# Patient Record
Sex: FEMALE | Race: OTHER | NOT HISPANIC OR LATINO | URBAN - METROPOLITAN AREA
[De-identification: names, ages, dates, MRNs, and addresses within clinical notes are randomized per-mention and may not be internally consistent; named-entity substitution may affect disease eponyms.]

---

## 2017-01-17 ENCOUNTER — EMERGENCY (EMERGENCY)
Facility: HOSPITAL | Age: 62
LOS: 0 days | Discharge: HOME | End: 2017-01-17
Admitting: EMERGENCY MEDICINE
Payer: MEDICAID

## 2017-01-17 DIAGNOSIS — S09.90XA UNSPECIFIED INJURY OF HEAD, INITIAL ENCOUNTER: ICD-10-CM

## 2017-01-17 DIAGNOSIS — R52 PAIN, UNSPECIFIED: ICD-10-CM

## 2017-01-17 DIAGNOSIS — I10 ESSENTIAL (PRIMARY) HYPERTENSION: ICD-10-CM

## 2017-01-17 DIAGNOSIS — E78.5 HYPERLIPIDEMIA, UNSPECIFIED: ICD-10-CM

## 2017-01-17 DIAGNOSIS — M25.561 PAIN IN RIGHT KNEE: ICD-10-CM
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
## 2017-01-17 DIAGNOSIS — M25.551 PAIN IN RIGHT HIP: ICD-10-CM

## 2017-01-17 PROCEDURE — 71010: CPT | Mod: 26

## 2017-01-17 PROCEDURE — 99284 EMERGENCY DEPT VISIT MOD MDM: CPT

## 2017-01-17 PROCEDURE — 73552 X-RAY EXAM OF FEMUR 2/>: CPT | Mod: 26

## 2017-01-17 PROCEDURE — 73502 X-RAY EXAM HIP UNI 2-3 VIEWS: CPT | Mod: 26

## 2017-01-17 PROCEDURE — 93010 ELECTROCARDIOGRAM REPORT: CPT

## 2017-01-17 PROCEDURE — 70450 CT HEAD/BRAIN W/O DYE: CPT | Mod: 26

## 2017-01-17 PROCEDURE — 73030 X-RAY EXAM OF SHOULDER: CPT | Mod: 26,RT

## 2019-03-25 ENCOUNTER — EMERGENCY (EMERGENCY)
Facility: HOSPITAL | Age: 64
LOS: 0 days | Discharge: HOME | End: 2019-03-25
Attending: EMERGENCY MEDICINE | Admitting: EMERGENCY MEDICINE
Payer: COMMERCIAL

## 2019-03-25 VITALS
TEMPERATURE: 98 F | WEIGHT: 104.06 LBS | OXYGEN SATURATION: 100 % | SYSTOLIC BLOOD PRESSURE: 153 MMHG | DIASTOLIC BLOOD PRESSURE: 81 MMHG | RESPIRATION RATE: 18 BRPM | HEART RATE: 81 BPM

## 2019-03-25 VITALS
DIASTOLIC BLOOD PRESSURE: 66 MMHG | HEART RATE: 66 BPM | TEMPERATURE: 98 F | RESPIRATION RATE: 15 BRPM | OXYGEN SATURATION: 100 % | SYSTOLIC BLOOD PRESSURE: 124 MMHG

## 2019-03-25 DIAGNOSIS — E78.5 HYPERLIPIDEMIA, UNSPECIFIED: ICD-10-CM

## 2019-03-25 DIAGNOSIS — R42 DIZZINESS AND GIDDINESS: ICD-10-CM

## 2019-03-25 DIAGNOSIS — I10 ESSENTIAL (PRIMARY) HYPERTENSION: ICD-10-CM

## 2019-03-25 LAB
ALBUMIN SERPL ELPH-MCNC: 4.2 G/DL — SIGNIFICANT CHANGE UP (ref 3.3–5)
ALP SERPL-CCNC: 88 U/L — SIGNIFICANT CHANGE UP (ref 40–120)
ALT FLD-CCNC: 25 U/L — SIGNIFICANT CHANGE UP (ref 12–78)
ANION GAP SERPL CALC-SCNC: 5 MMOL/L — SIGNIFICANT CHANGE UP (ref 5–17)
APPEARANCE UR: CLEAR — SIGNIFICANT CHANGE UP
APTT BLD: 31.8 SEC — SIGNIFICANT CHANGE UP (ref 27.5–36.3)
AST SERPL-CCNC: 25 U/L — SIGNIFICANT CHANGE UP (ref 15–37)
BASOPHILS # BLD AUTO: 0.07 K/UL — SIGNIFICANT CHANGE UP (ref 0–0.2)
BASOPHILS NFR BLD AUTO: 1 % — SIGNIFICANT CHANGE UP (ref 0–2)
BILIRUB SERPL-MCNC: 0.6 MG/DL — SIGNIFICANT CHANGE UP (ref 0.2–1.2)
BILIRUB UR-MCNC: NEGATIVE — SIGNIFICANT CHANGE UP
BUN SERPL-MCNC: 13 MG/DL — SIGNIFICANT CHANGE UP (ref 7–23)
CALCIUM SERPL-MCNC: 8.3 MG/DL — LOW (ref 8.5–10.1)
CHLORIDE SERPL-SCNC: 100 MMOL/L — SIGNIFICANT CHANGE UP (ref 96–108)
CO2 SERPL-SCNC: 28 MMOL/L — SIGNIFICANT CHANGE UP (ref 22–31)
COLOR SPEC: YELLOW — SIGNIFICANT CHANGE UP
CREAT SERPL-MCNC: 0.77 MG/DL — SIGNIFICANT CHANGE UP (ref 0.5–1.3)
DIFF PNL FLD: NEGATIVE — SIGNIFICANT CHANGE UP
EOSINOPHIL # BLD AUTO: 0.15 K/UL — SIGNIFICANT CHANGE UP (ref 0–0.5)
EOSINOPHIL NFR BLD AUTO: 2.1 % — SIGNIFICANT CHANGE UP (ref 0–6)
GLUCOSE SERPL-MCNC: 123 MG/DL — HIGH (ref 70–99)
GLUCOSE UR QL: NEGATIVE MG/DL — SIGNIFICANT CHANGE UP
HCT VFR BLD CALC: 40.4 % — SIGNIFICANT CHANGE UP (ref 34.5–45)
HGB BLD-MCNC: 13.8 G/DL — SIGNIFICANT CHANGE UP (ref 11.5–15.5)
IMM GRANULOCYTES NFR BLD AUTO: 0.1 % — SIGNIFICANT CHANGE UP (ref 0–1.5)
INR BLD: 0.96 RATIO — SIGNIFICANT CHANGE UP (ref 0.88–1.16)
KETONES UR-MCNC: NEGATIVE — SIGNIFICANT CHANGE UP
LEUKOCYTE ESTERASE UR-ACNC: NEGATIVE — SIGNIFICANT CHANGE UP
LYMPHOCYTES # BLD AUTO: 2.79 K/UL — SIGNIFICANT CHANGE UP (ref 1–3.3)
LYMPHOCYTES # BLD AUTO: 39.7 % — SIGNIFICANT CHANGE UP (ref 13–44)
MAGNESIUM SERPL-MCNC: 2.1 MG/DL — SIGNIFICANT CHANGE UP (ref 1.6–2.6)
MCHC RBC-ENTMCNC: 30 PG — SIGNIFICANT CHANGE UP (ref 27–34)
MCHC RBC-ENTMCNC: 34.2 GM/DL — SIGNIFICANT CHANGE UP (ref 32–36)
MCV RBC AUTO: 87.8 FL — SIGNIFICANT CHANGE UP (ref 80–100)
MONOCYTES # BLD AUTO: 0.5 K/UL — SIGNIFICANT CHANGE UP (ref 0–0.9)
MONOCYTES NFR BLD AUTO: 7.1 % — SIGNIFICANT CHANGE UP (ref 2–14)
NEUTROPHILS # BLD AUTO: 3.51 K/UL — SIGNIFICANT CHANGE UP (ref 1.8–7.4)
NEUTROPHILS NFR BLD AUTO: 50 % — SIGNIFICANT CHANGE UP (ref 43–77)
NITRITE UR-MCNC: NEGATIVE — SIGNIFICANT CHANGE UP
NRBC # BLD: 0 /100 WBCS — SIGNIFICANT CHANGE UP (ref 0–0)
NT-PROBNP SERPL-SCNC: 19 PG/ML — SIGNIFICANT CHANGE UP (ref 0–125)
PH UR: 8 — SIGNIFICANT CHANGE UP (ref 5–8)
PLATELET # BLD AUTO: 274 K/UL — SIGNIFICANT CHANGE UP (ref 150–400)
POTASSIUM SERPL-MCNC: 3.4 MMOL/L — LOW (ref 3.5–5.3)
POTASSIUM SERPL-SCNC: 3.4 MMOL/L — LOW (ref 3.5–5.3)
PROT SERPL-MCNC: 8.3 GM/DL — SIGNIFICANT CHANGE UP (ref 6–8.3)
PROT UR-MCNC: NEGATIVE MG/DL — SIGNIFICANT CHANGE UP
PROTHROM AB SERPL-ACNC: 10.6 SEC — SIGNIFICANT CHANGE UP (ref 10–12.9)
RBC # BLD: 4.6 M/UL — SIGNIFICANT CHANGE UP (ref 3.8–5.2)
RBC # FLD: 12.4 % — SIGNIFICANT CHANGE UP (ref 10.3–14.5)
SODIUM SERPL-SCNC: 133 MMOL/L — LOW (ref 135–145)
SP GR SPEC: 1.01 — SIGNIFICANT CHANGE UP (ref 1.01–1.02)
TROPONIN I SERPL-MCNC: <0.015 NG/ML — SIGNIFICANT CHANGE UP (ref 0.01–0.04)
UROBILINOGEN FLD QL: NEGATIVE MG/DL — SIGNIFICANT CHANGE UP
WBC # BLD: 7.03 K/UL — SIGNIFICANT CHANGE UP (ref 3.8–10.5)
WBC # FLD AUTO: 7.03 K/UL — SIGNIFICANT CHANGE UP (ref 3.8–10.5)

## 2019-03-25 PROCEDURE — 99284 EMERGENCY DEPT VISIT MOD MDM: CPT

## 2019-03-25 PROCEDURE — 71046 X-RAY EXAM CHEST 2 VIEWS: CPT | Mod: 26

## 2019-03-25 PROCEDURE — 93010 ELECTROCARDIOGRAM REPORT: CPT

## 2019-03-25 RX ORDER — MECLIZINE HCL 12.5 MG
25 TABLET ORAL ONCE
Qty: 0 | Refills: 0 | Status: COMPLETED | OUTPATIENT
Start: 2019-03-25 | End: 2019-03-25

## 2019-03-25 RX ORDER — MECLIZINE HCL 12.5 MG
1 TABLET ORAL
Qty: 15 | Refills: 0
Start: 2019-03-25 | End: 2019-03-29

## 2019-03-25 RX ADMIN — Medication 25 MILLIGRAM(S): at 11:15

## 2019-03-25 NOTE — ED ADULT NURSE NOTE - OBJECTIVE STATEMENT
Patient presents to ED c/o chest pain associated with dizziness, nausea and weakness. Pt states symptoms started about 8am this morning. Denies vomiting, fevers, chills, abd pain, SOB.

## 2019-03-25 NOTE — ED PROVIDER NOTE - CARE PLAN
Principal Discharge DX:	Chest pressure Principal Discharge DX:	Lightheadedness Principal Discharge DX:	Lightheadedness  Secondary Diagnosis:	Dizziness

## 2019-03-25 NOTE — ED ADULT NURSE NOTE - NSIMPLEMENTINTERV_GEN_ALL_ED
Implemented All Fall Risk Interventions:  [place] to call system. Call bell, personal items and telephone within reach. Instruct patient to call for assistance. Room bathroom lighting operational. Non-slip footwear when patient is off stretcher. Physically safe environment: no spills, clutter or unnecessary equipment. Stretcher in lowest position, wheels locked, appropriate side rails in place. Provide visual cue, wrist band, yellow gown, etc. Monitor gait and stability. Monitor for mental status changes and reorient to person, place, and time. Review medications for side effects contributing to fall risk. Reinforce activity limits and safety measures with patient and family.

## 2019-03-25 NOTE — ED PROVIDER NOTE - OBJECTIVE STATEMENT
63yoF hx of htn, hld pw chest pressure, starting about 2 hours prior to arrival, radiating to right shoulder, arm and neck. Pain started slowly, comes in waves, and is associated with general malaise. Patient never had this before. associated nausea and generalized weakness. Denies fevers, chills, vomiting, diarrhea, abd pain, numbness, tingling, cough, cold, recent travel or surgery, or other issues. 63yoF hx of htn, hld pw chest pressure, starting about 2 hours prior to arrival, radiating to right shoulder, arm and neck. Pain started slowly, comes in waves, and is associated with general malaise. Patient never had this before. associated nausea and generalized weakness. Denies fevers, chills, vomiting, diarrhea, abd pain, numbness, tingling, cough, cold, recent travel or surgery, or other issues.  EMS gave 4 Aspirin 81mg in route

## 2019-03-25 NOTE — ED PROVIDER NOTE - ATTENDING CONTRIBUTION TO CARE
I, Georges Alvarez MD, personally saw the patient with the resident, and completed the key components of the history and physical exam. I then discussed the management plan with the resident.

## 2019-03-25 NOTE — ED PROVIDER NOTE - NSFOLLOWUPINSTRUCTIONS_ED_ALL_ED_FT
1) Follow up with your doctor in 1 week. Call today for an appointment.   2) Return to the ER immediately for new or worsening symptoms including severe chest pain, vomiting or other issues.   3) Take Meclizine 25mg three times a day as needed for lightheadedness, pressure or dizziness.

## 2019-03-25 NOTE — ED PROVIDER NOTE - CLINICAL SUMMARY MEDICAL DECISION MAKING FREE TEXT BOX
female with chest pressure, heart score 2, will send labs, trop x2, ekg, cxr and reass. story inconsistent with PE, dissection or other vascular catastrophe. patient appears well with normal vitals.

## 2019-03-25 NOTE — ED PROVIDER NOTE - PROGRESS NOTE DETAILS
Scribe AD for attending Dr. Alvarez: 63 presents with body pains, aleena douglas pipe positive for dizziness. Will check EKG and give meclizine. Pt with h/o dizziness with MRI done 2 months ago with no abnormal findings. Santiago GARCES - patient seen and reassessed. feels improved. family bedside. will dc home with close follow up. Patient aware and agrees with plan.

## 2019-03-25 NOTE — ED ADULT NURSE REASSESSMENT NOTE - NS ED NURSE REASSESS COMMENT FT1
pt being assisted by YOUSUF Easton to bathroom
Antivert administered, pt reports she feels "better". Pt ambulated to bathroom, denies dizziness.

## 2019-03-25 NOTE — ED ADULT NURSE NOTE - CHPI ED NUR SYMPTOMS NEG
no fever/no nausea/no syncope/no chills/no back pain/no diaphoresis/no congestion/no shortness of breath/no vomiting

## 2019-03-28 ENCOUNTER — EMERGENCY (EMERGENCY)
Facility: HOSPITAL | Age: 64
LOS: 1 days | Discharge: HOME | End: 2019-03-28
Attending: EMERGENCY MEDICINE | Admitting: STUDENT IN AN ORGANIZED HEALTH CARE EDUCATION/TRAINING PROGRAM
Payer: COMMERCIAL

## 2019-03-28 VITALS
TEMPERATURE: 98 F | SYSTOLIC BLOOD PRESSURE: 107 MMHG | OXYGEN SATURATION: 97 % | RESPIRATION RATE: 16 BRPM | DIASTOLIC BLOOD PRESSURE: 53 MMHG | HEART RATE: 71 BPM

## 2019-03-28 VITALS — WEIGHT: 104.06 LBS

## 2019-03-28 DIAGNOSIS — R42 DIZZINESS AND GIDDINESS: ICD-10-CM

## 2019-03-28 DIAGNOSIS — M54.2 CERVICALGIA: ICD-10-CM

## 2019-03-28 DIAGNOSIS — R25.1 TREMOR, UNSPECIFIED: ICD-10-CM

## 2019-03-28 DIAGNOSIS — Z79.52 LONG TERM (CURRENT) USE OF SYSTEMIC STEROIDS: ICD-10-CM

## 2019-03-28 DIAGNOSIS — E78.5 HYPERLIPIDEMIA, UNSPECIFIED: ICD-10-CM

## 2019-03-28 DIAGNOSIS — I10 ESSENTIAL (PRIMARY) HYPERTENSION: ICD-10-CM

## 2019-03-28 LAB
ALBUMIN SERPL ELPH-MCNC: 4 G/DL — SIGNIFICANT CHANGE UP (ref 3.3–5)
ALP SERPL-CCNC: 76 U/L — SIGNIFICANT CHANGE UP (ref 40–120)
ALT FLD-CCNC: 23 U/L — SIGNIFICANT CHANGE UP (ref 12–78)
ANION GAP SERPL CALC-SCNC: 7 MMOL/L — SIGNIFICANT CHANGE UP (ref 5–17)
APPEARANCE UR: CLEAR — SIGNIFICANT CHANGE UP
AST SERPL-CCNC: 19 U/L — SIGNIFICANT CHANGE UP (ref 15–37)
BACTERIA # UR AUTO: ABNORMAL
BASOPHILS # BLD AUTO: 0.04 K/UL — SIGNIFICANT CHANGE UP (ref 0–0.2)
BASOPHILS NFR BLD AUTO: 0.2 % — SIGNIFICANT CHANGE UP (ref 0–2)
BILIRUB SERPL-MCNC: 0.3 MG/DL — SIGNIFICANT CHANGE UP (ref 0.2–1.2)
BILIRUB UR-MCNC: NEGATIVE — SIGNIFICANT CHANGE UP
BUN SERPL-MCNC: 11 MG/DL — SIGNIFICANT CHANGE UP (ref 7–23)
CALCIUM SERPL-MCNC: 8.6 MG/DL — SIGNIFICANT CHANGE UP (ref 8.5–10.1)
CHLORIDE SERPL-SCNC: 107 MMOL/L — SIGNIFICANT CHANGE UP (ref 96–108)
CO2 SERPL-SCNC: 27 MMOL/L — SIGNIFICANT CHANGE UP (ref 22–31)
COLOR SPEC: YELLOW — SIGNIFICANT CHANGE UP
CREAT SERPL-MCNC: 0.8 MG/DL — SIGNIFICANT CHANGE UP (ref 0.5–1.3)
DIFF PNL FLD: NEGATIVE — SIGNIFICANT CHANGE UP
EOSINOPHIL # BLD AUTO: 0 K/UL — SIGNIFICANT CHANGE UP (ref 0–0.5)
EOSINOPHIL NFR BLD AUTO: 0 % — SIGNIFICANT CHANGE UP (ref 0–6)
EPI CELLS # UR: SIGNIFICANT CHANGE UP
GLUCOSE SERPL-MCNC: 121 MG/DL — HIGH (ref 70–99)
GLUCOSE UR QL: NEGATIVE MG/DL — SIGNIFICANT CHANGE UP
HCT VFR BLD CALC: 39.4 % — SIGNIFICANT CHANGE UP (ref 34.5–45)
HGB BLD-MCNC: 13.1 G/DL — SIGNIFICANT CHANGE UP (ref 11.5–15.5)
IMM GRANULOCYTES NFR BLD AUTO: 0.5 % — SIGNIFICANT CHANGE UP (ref 0–1.5)
KETONES UR-MCNC: NEGATIVE — SIGNIFICANT CHANGE UP
LEUKOCYTE ESTERASE UR-ACNC: ABNORMAL
LYMPHOCYTES # BLD AUTO: 1.9 K/UL — SIGNIFICANT CHANGE UP (ref 1–3.3)
LYMPHOCYTES # BLD AUTO: 10.9 % — LOW (ref 13–44)
MCHC RBC-ENTMCNC: 29.2 PG — SIGNIFICANT CHANGE UP (ref 27–34)
MCHC RBC-ENTMCNC: 33.2 GM/DL — SIGNIFICANT CHANGE UP (ref 32–36)
MCV RBC AUTO: 87.9 FL — SIGNIFICANT CHANGE UP (ref 80–100)
MONOCYTES # BLD AUTO: 0.64 K/UL — SIGNIFICANT CHANGE UP (ref 0–0.9)
MONOCYTES NFR BLD AUTO: 3.7 % — SIGNIFICANT CHANGE UP (ref 2–14)
NEUTROPHILS # BLD AUTO: 14.76 K/UL — HIGH (ref 1.8–7.4)
NEUTROPHILS NFR BLD AUTO: 84.7 % — HIGH (ref 43–77)
NITRITE UR-MCNC: NEGATIVE — SIGNIFICANT CHANGE UP
NRBC # BLD: 0 /100 WBCS — SIGNIFICANT CHANGE UP (ref 0–0)
PH UR: 7 — SIGNIFICANT CHANGE UP (ref 5–8)
PLATELET # BLD AUTO: 288 K/UL — SIGNIFICANT CHANGE UP (ref 150–400)
POTASSIUM SERPL-MCNC: 3.8 MMOL/L — SIGNIFICANT CHANGE UP (ref 3.5–5.3)
POTASSIUM SERPL-SCNC: 3.8 MMOL/L — SIGNIFICANT CHANGE UP (ref 3.5–5.3)
PROT SERPL-MCNC: 8.2 GM/DL — SIGNIFICANT CHANGE UP (ref 6–8.3)
PROT UR-MCNC: NEGATIVE MG/DL — SIGNIFICANT CHANGE UP
RBC # BLD: 4.48 M/UL — SIGNIFICANT CHANGE UP (ref 3.8–5.2)
RBC # FLD: 12.2 % — SIGNIFICANT CHANGE UP (ref 10.3–14.5)
RBC CASTS # UR COMP ASSIST: SIGNIFICANT CHANGE UP /HPF (ref 0–4)
SODIUM SERPL-SCNC: 141 MMOL/L — SIGNIFICANT CHANGE UP (ref 135–145)
SP GR SPEC: 1 — LOW (ref 1.01–1.02)
UROBILINOGEN FLD QL: NEGATIVE MG/DL — SIGNIFICANT CHANGE UP
WBC # BLD: 17.43 K/UL — HIGH (ref 3.8–10.5)
WBC # FLD AUTO: 17.43 K/UL — HIGH (ref 3.8–10.5)
WBC UR QL: SIGNIFICANT CHANGE UP

## 2019-03-28 PROCEDURE — 99285 EMERGENCY DEPT VISIT HI MDM: CPT

## 2019-03-28 PROCEDURE — 71046 X-RAY EXAM CHEST 2 VIEWS: CPT | Mod: 26

## 2019-03-28 RX ORDER — SODIUM CHLORIDE 9 MG/ML
1000 INJECTION INTRAMUSCULAR; INTRAVENOUS; SUBCUTANEOUS ONCE
Qty: 0 | Refills: 0 | Status: COMPLETED | OUTPATIENT
Start: 2019-03-28 | End: 2019-03-28

## 2019-03-28 RX ORDER — PROCHLORPERAZINE MALEATE 5 MG
10 TABLET ORAL ONCE
Qty: 0 | Refills: 0 | Status: COMPLETED | OUTPATIENT
Start: 2019-03-28 | End: 2019-03-28

## 2019-03-28 RX ADMIN — Medication 10 MILLIGRAM(S): at 17:14

## 2019-03-28 RX ADMIN — Medication 1 MILLIGRAM(S): at 17:14

## 2019-03-28 RX ADMIN — SODIUM CHLORIDE 1000 MILLILITER(S): 9 INJECTION INTRAMUSCULAR; INTRAVENOUS; SUBCUTANEOUS at 18:54

## 2019-03-28 NOTE — ED STATDOCS - PROGRESS NOTE DETAILS
Patient seen and evaluated, ED attending note and orders reviewed, will continue with patient follow up and care -Gissel Cardozo PA-C pt reeval states she is sleepy, pt denies any chest pain, sob, dyspnea, abdominal pain, nausea, vomiting, necl pain, fever, chills or any other complaints currently. will give pt IVFS and reeval. -Gissel Cardozo PA-C pt still asleep at bedside, natalie at bedside said pt has chronic hearing loss in her right ear and is being followed by an ENT for followup possible hearing aid, still awaiting UA collection. -Gissel Cardozo PA-C pt daughter aware of labs and ua will call for blood culture results and to fu with pmtrino tran. pt daughter  agrees with plan. -Gissel Cardozo PA-C

## 2019-03-28 NOTE — ED ADULT NURSE NOTE - OBJECTIVE STATEMENT
pt c/o shakiness, and head and neck pain that stated this afternoon. pt denies fever, seen in ED on monday for chest pain and discharged same day. pt denies chest pain SOb dizziness or weakness at this time

## 2019-03-28 NOTE — ED STATDOCS - CLINICAL SUMMARY MEDICAL DECISION MAKING FREE TEXT BOX
Pt p/w with subjective tremors. no obvious tremors noted. Likely side effect of cold medication. Will obtain basic labs, treat symptoms and reassess. Normal neuro exam.

## 2019-03-28 NOTE — ED STATDOCS - OBJECTIVE STATEMENT
64 y/o F with PMHx of HLD, HTN presenting to the ED c/o tremors, head and neck pain that started at 2PM this afternoon. When shaking began, pt was sitting on the sofa, not exerting herself. +nausea. Pt was seen in ED on 4 days ago for CP and discharged same day. Pt notes she was given nasal spray and Methylprednisolone  yesterday for a sinus infection/cold. Denies CP, SOB , fever, dizziness, or weakness.

## 2019-03-28 NOTE — ED ADULT TRIAGE NOTE - CHIEF COMPLAINT QUOTE
pt c/o shakiness, and head and neck pain that stated this afternoon. pt denies fever, seen in ED on monday for chest pain and discharged same day. pt denies chest pain SOb dizziness or weakness at this time pt c/o shakiness, and head and neck pain that started this afternoon. pt denies fever, seen in ED on Monday for chest pain and discharged same day. pt denies chest pain SOB dizziness or weakness at this time

## 2019-03-28 NOTE — ED STATDOCS - NEUROLOGICAL, MLM
sensation is normal and strength is normal. CN 2-12 intact. No dysmetria. No obvious tremors noted on exam.

## 2019-03-29 PROBLEM — I10 ESSENTIAL (PRIMARY) HYPERTENSION: Chronic | Status: ACTIVE | Noted: 2019-03-25

## 2019-03-29 PROBLEM — E78.5 HYPERLIPIDEMIA, UNSPECIFIED: Chronic | Status: ACTIVE | Noted: 2019-03-25

## 2019-03-29 LAB
CULTURE RESULTS: SIGNIFICANT CHANGE UP
SPECIMEN SOURCE: SIGNIFICANT CHANGE UP

## 2019-04-02 LAB
CULTURE RESULTS: SIGNIFICANT CHANGE UP
CULTURE RESULTS: SIGNIFICANT CHANGE UP
SPECIMEN SOURCE: SIGNIFICANT CHANGE UP
SPECIMEN SOURCE: SIGNIFICANT CHANGE UP

## 2019-04-23 ENCOUNTER — OUTPATIENT (OUTPATIENT)
Dept: OUTPATIENT SERVICES | Facility: HOSPITAL | Age: 64
LOS: 1 days | End: 2019-04-23
Payer: COMMERCIAL

## 2019-04-23 DIAGNOSIS — R07.9 CHEST PAIN, UNSPECIFIED: ICD-10-CM

## 2019-04-23 PROCEDURE — 93018 CV STRESS TEST I&R ONLY: CPT

## 2019-04-23 PROCEDURE — 93016 CV STRESS TEST SUPVJ ONLY: CPT

## 2019-04-23 PROCEDURE — 93017 CV STRESS TEST TRACING ONLY: CPT

## 2019-05-07 ENCOUNTER — EMERGENCY (EMERGENCY)
Facility: HOSPITAL | Age: 64
LOS: 1 days | Discharge: HOME | End: 2019-05-07
Attending: EMERGENCY MEDICINE
Payer: COMMERCIAL

## 2019-05-07 VITALS
SYSTOLIC BLOOD PRESSURE: 188 MMHG | HEART RATE: 84 BPM | WEIGHT: 104.94 LBS | OXYGEN SATURATION: 100 % | RESPIRATION RATE: 18 BRPM | DIASTOLIC BLOOD PRESSURE: 83 MMHG | TEMPERATURE: 98 F | HEIGHT: 62 IN

## 2019-05-07 VITALS
HEART RATE: 72 BPM | DIASTOLIC BLOOD PRESSURE: 60 MMHG | TEMPERATURE: 98 F | OXYGEN SATURATION: 98 % | RESPIRATION RATE: 18 BRPM | SYSTOLIC BLOOD PRESSURE: 123 MMHG

## 2019-05-07 LAB
ALBUMIN SERPL ELPH-MCNC: 4.4 G/DL — SIGNIFICANT CHANGE UP (ref 3.3–5.2)
ALP SERPL-CCNC: 84 U/L — SIGNIFICANT CHANGE UP (ref 40–120)
ALT FLD-CCNC: 26 U/L — SIGNIFICANT CHANGE UP
ANION GAP SERPL CALC-SCNC: 12 MMOL/L — SIGNIFICANT CHANGE UP (ref 5–17)
AST SERPL-CCNC: 27 U/L — SIGNIFICANT CHANGE UP
BILIRUB SERPL-MCNC: 0.2 MG/DL — LOW (ref 0.4–2)
BUN SERPL-MCNC: 12 MG/DL — SIGNIFICANT CHANGE UP (ref 8–20)
CALCIUM SERPL-MCNC: 9.2 MG/DL — SIGNIFICANT CHANGE UP (ref 8.6–10.2)
CHLORIDE SERPL-SCNC: 98 MMOL/L — SIGNIFICANT CHANGE UP (ref 98–107)
CO2 SERPL-SCNC: 26 MMOL/L — SIGNIFICANT CHANGE UP (ref 22–29)
CREAT SERPL-MCNC: 0.66 MG/DL — SIGNIFICANT CHANGE UP (ref 0.5–1.3)
GLUCOSE SERPL-MCNC: 133 MG/DL — HIGH (ref 70–115)
HCT VFR BLD CALC: 40.5 % — SIGNIFICANT CHANGE UP (ref 37–47)
HGB BLD-MCNC: 13.6 G/DL — SIGNIFICANT CHANGE UP (ref 12–16)
MCHC RBC-ENTMCNC: 30 PG — SIGNIFICANT CHANGE UP (ref 27–31)
MCHC RBC-ENTMCNC: 33.6 G/DL — SIGNIFICANT CHANGE UP (ref 32–36)
MCV RBC AUTO: 89.2 FL — SIGNIFICANT CHANGE UP (ref 81–99)
PLATELET # BLD AUTO: 304 K/UL — SIGNIFICANT CHANGE UP (ref 150–400)
POTASSIUM SERPL-MCNC: 3.6 MMOL/L — SIGNIFICANT CHANGE UP (ref 3.5–5.3)
POTASSIUM SERPL-SCNC: 3.6 MMOL/L — SIGNIFICANT CHANGE UP (ref 3.5–5.3)
PROT SERPL-MCNC: 7.7 G/DL — SIGNIFICANT CHANGE UP (ref 6.6–8.7)
RBC # BLD: 4.54 M/UL — SIGNIFICANT CHANGE UP (ref 4.4–5.2)
RBC # FLD: 12.8 % — SIGNIFICANT CHANGE UP (ref 11–15.6)
SODIUM SERPL-SCNC: 136 MMOL/L — SIGNIFICANT CHANGE UP (ref 135–145)
TROPONIN T SERPL-MCNC: <0.01 NG/ML — SIGNIFICANT CHANGE UP (ref 0–0.06)
WBC # BLD: 7.2 K/UL — SIGNIFICANT CHANGE UP (ref 4.8–10.8)
WBC # FLD AUTO: 7.2 K/UL — SIGNIFICANT CHANGE UP (ref 4.8–10.8)

## 2019-05-07 PROCEDURE — 93005 ELECTROCARDIOGRAM TRACING: CPT

## 2019-05-07 PROCEDURE — 99284 EMERGENCY DEPT VISIT MOD MDM: CPT | Mod: 25

## 2019-05-07 PROCEDURE — 36415 COLL VENOUS BLD VENIPUNCTURE: CPT

## 2019-05-07 PROCEDURE — 80053 COMPREHEN METABOLIC PANEL: CPT

## 2019-05-07 PROCEDURE — 70450 CT HEAD/BRAIN W/O DYE: CPT

## 2019-05-07 PROCEDURE — 84484 ASSAY OF TROPONIN QUANT: CPT

## 2019-05-07 PROCEDURE — 96374 THER/PROPH/DIAG INJ IV PUSH: CPT

## 2019-05-07 PROCEDURE — 93010 ELECTROCARDIOGRAM REPORT: CPT

## 2019-05-07 PROCEDURE — 85027 COMPLETE CBC AUTOMATED: CPT

## 2019-05-07 PROCEDURE — 70450 CT HEAD/BRAIN W/O DYE: CPT | Mod: 26

## 2019-05-07 RX ORDER — HYDRALAZINE HCL 50 MG
10 TABLET ORAL ONCE
Qty: 0 | Refills: 0 | Status: COMPLETED | OUTPATIENT
Start: 2019-05-07 | End: 2019-05-07

## 2019-05-07 RX ORDER — ACETAMINOPHEN 500 MG
650 TABLET ORAL ONCE
Qty: 0 | Refills: 0 | Status: COMPLETED | OUTPATIENT
Start: 2019-05-07 | End: 2019-05-07

## 2019-05-07 RX ORDER — METOCLOPRAMIDE HCL 10 MG
10 TABLET ORAL ONCE
Qty: 0 | Refills: 0 | Status: COMPLETED | OUTPATIENT
Start: 2019-05-07 | End: 2019-05-07

## 2019-05-07 RX ADMIN — Medication 650 MILLIGRAM(S): at 01:43

## 2019-05-07 RX ADMIN — Medication 10 MILLIGRAM(S): at 01:52

## 2019-05-07 NOTE — ED PROVIDER NOTE - OBJECTIVE STATEMENT
63 y/o F pt with hx of HTN, HLD presents to ED with her  and brother c/o onset of elevated BP and HA since Thursday, persistent HA described as pressure of posterior head as well as sinus region with sensation of electric shocks of b/l lower posterior head. She measures her BP at home, with persisted elevated BP x5 days. Pt saw her PCP on Saturday, did not advise change in medication. Pt on Amlodipine, took 10 mg total intermittently in half doses, no significant relief. Denies vision changes, photophobia, N/V, hitting her head, LOC.   Dr. Perez never worked with

## 2019-05-07 NOTE — ED ADULT NURSE NOTE - OBJECTIVE STATEMENT
Assumed care of patient at 0145, alert and oriented x4, resting comfortably in bed. Pt stated since Friday she has been having headaches with shoot pains " when I walk they got up my neck into my brain". Pt in neurologically intact at this time. Pt report she took her BP at home and it was high so she took a double dose of her Amlodipine today. Pt seen by MD, CT scan done, IV placed labs sent, NSR noted on CM. pt educated on plan of care, pt able to successfully teach back plan of care to RN, RN will continue to reeducate pt during hospital stay.

## 2019-05-07 NOTE — ED ADULT TRIAGE NOTE - CHIEF COMPLAINT QUOTE
Pt c/o dizziness, head pressure and shooting pains in neck and states her pressure has been running high and she saw her PMD on Saturday and was told maybe it's her sinus's. Pt acting appropriately during triage and denies visual disturbances, weakness.

## 2019-05-07 NOTE — ED ADULT NURSE NOTE - CHPI ED NUR SYMPTOMS NEG
no blurred vision/no loss of consciousness/no nausea/no numbness/no vomiting/no dizziness/no fever/no change in level of consciousness/no confusion/no weakness

## 2019-05-07 NOTE — ED ADULT NURSE REASSESSMENT NOTE - NS ED NURSE REASSESS COMMENT FT1
Discharge instructions reviewed with patient who verbalized understanding. Iv removed. Safety ,maintained.

## 2019-05-07 NOTE — ED PROVIDER NOTE - PROGRESS NOTE DETAILS
Ashanti: pt feels much better, bp 145/75. no congestion or facial pain to go with mild sinus inflammation, no abx. ce and ekg wnl. pt has scheduled stress test for this week, no cp. return precautions and results. stable for d/c.

## 2019-05-07 NOTE — ED PROVIDER NOTE - PHYSICAL EXAMINATION
Gen: No acute distress, non toxic  HEENT: Mucous membranes moist, pink conjunctivae, EOMI  CV: RRR  Resp: CTAB, normal rate and effort  GI: Abdomen soft, NT, ND. No rebound, no guarding  Neuro: A&O x 3, moving all 4 extremities, neurologically intact.

## 2019-05-07 NOTE — ED PROVIDER NOTE - NS ED ROS FT
ROS: No fever/chills.  No chest painNo SOB/cough/. No abdominal pain, N/V/D,No dysuria/frequency.  No headache. No Dizziness.    No rashes  No numbness/weakness

## 2019-05-07 NOTE — ED ADULT NURSE NOTE - NSIMPLEMENTINTERV_GEN_ALL_ED
Implemented All Universal Safety Interventions:  [place] to call system. Call bell, personal items and telephone within reach. Instruct patient to call for assistance. Room bathroom lighting operational. Non-slip footwear when patient is off stretcher. Physically safe environment: no spills, clutter or unnecessary equipment. Stretcher in lowest position, wheels locked, appropriate side rails in place.

## 2019-06-12 ENCOUNTER — OUTPATIENT (OUTPATIENT)
Dept: OUTPATIENT SERVICES | Facility: HOSPITAL | Age: 64
LOS: 1 days | End: 2019-06-12
Payer: COMMERCIAL

## 2019-06-12 DIAGNOSIS — R94.31 ABNORMAL ELECTROCARDIOGRAM [ECG] [EKG]: ICD-10-CM

## 2019-06-12 PROCEDURE — 78452 HT MUSCLE IMAGE SPECT MULT: CPT | Mod: 26

## 2019-06-12 PROCEDURE — 78452 HT MUSCLE IMAGE SPECT MULT: CPT

## 2019-06-12 PROCEDURE — 93018 CV STRESS TEST I&R ONLY: CPT

## 2019-06-12 PROCEDURE — 93017 CV STRESS TEST TRACING ONLY: CPT

## 2019-06-12 PROCEDURE — 93016 CV STRESS TEST SUPVJ ONLY: CPT

## 2019-06-12 PROCEDURE — A9500: CPT

## 2020-02-26 ENCOUNTER — RESULT REVIEW (OUTPATIENT)
Age: 65
End: 2020-02-26

## 2022-06-10 ENCOUNTER — EMERGENCY (EMERGENCY)
Facility: HOSPITAL | Age: 67
LOS: 1 days | Discharge: HOME | End: 2022-06-10
Attending: EMERGENCY MEDICINE
Payer: COMMERCIAL

## 2022-06-10 VITALS
DIASTOLIC BLOOD PRESSURE: 82 MMHG | OXYGEN SATURATION: 96 % | RESPIRATION RATE: 18 BRPM | HEART RATE: 91 BPM | SYSTOLIC BLOOD PRESSURE: 164 MMHG | TEMPERATURE: 100 F | WEIGHT: 104.06 LBS | HEIGHT: 62 IN

## 2022-06-10 LAB
ACANTHOCYTES BLD QL SMEAR: SLIGHT — SIGNIFICANT CHANGE UP
ALBUMIN SERPL ELPH-MCNC: 3.4 G/DL — SIGNIFICANT CHANGE UP (ref 3.3–5.2)
ALP SERPL-CCNC: 66 U/L — SIGNIFICANT CHANGE UP (ref 40–120)
ALT FLD-CCNC: 15 U/L — SIGNIFICANT CHANGE UP
ANION GAP SERPL CALC-SCNC: 10 MMOL/L — SIGNIFICANT CHANGE UP (ref 5–17)
APPEARANCE UR: CLEAR — SIGNIFICANT CHANGE UP
AST SERPL-CCNC: 30 U/L — SIGNIFICANT CHANGE UP
BACTERIA # UR AUTO: ABNORMAL
BASOPHILS # BLD AUTO: 0.07 K/UL — SIGNIFICANT CHANGE UP (ref 0–0.2)
BASOPHILS NFR BLD AUTO: 0.9 % — SIGNIFICANT CHANGE UP (ref 0–2)
BILIRUB SERPL-MCNC: 0.6 MG/DL — SIGNIFICANT CHANGE UP (ref 0.4–2)
BILIRUB UR-MCNC: NEGATIVE — SIGNIFICANT CHANGE UP
BUN SERPL-MCNC: 7.3 MG/DL — LOW (ref 8–20)
CALCIUM SERPL-MCNC: 8.1 MG/DL — LOW (ref 8.6–10.2)
CHLORIDE SERPL-SCNC: 100 MMOL/L — SIGNIFICANT CHANGE UP (ref 98–107)
CO2 SERPL-SCNC: 23 MMOL/L — SIGNIFICANT CHANGE UP (ref 22–29)
COLOR SPEC: YELLOW — SIGNIFICANT CHANGE UP
CREAT SERPL-MCNC: 0.58 MG/DL — SIGNIFICANT CHANGE UP (ref 0.5–1.3)
DIFF PNL FLD: ABNORMAL
EGFR: 100 ML/MIN/1.73M2 — SIGNIFICANT CHANGE UP
EOSINOPHIL # BLD AUTO: 0.07 K/UL — SIGNIFICANT CHANGE UP (ref 0–0.5)
EOSINOPHIL NFR BLD AUTO: 0.9 % — SIGNIFICANT CHANGE UP (ref 0–6)
EPI CELLS # UR: SIGNIFICANT CHANGE UP
GIANT PLATELETS BLD QL SMEAR: PRESENT — SIGNIFICANT CHANGE UP
GLUCOSE SERPL-MCNC: 96 MG/DL — SIGNIFICANT CHANGE UP (ref 70–99)
GLUCOSE UR QL: NEGATIVE MG/DL — SIGNIFICANT CHANGE UP
HCT VFR BLD CALC: 34.7 % — SIGNIFICANT CHANGE UP (ref 34.5–45)
HGB BLD-MCNC: 11.4 G/DL — LOW (ref 11.5–15.5)
KETONES UR-MCNC: NEGATIVE — SIGNIFICANT CHANGE UP
LEUKOCYTE ESTERASE UR-ACNC: ABNORMAL
LYMPHOCYTES # BLD AUTO: 2.86 K/UL — SIGNIFICANT CHANGE UP (ref 1–3.3)
LYMPHOCYTES # BLD AUTO: 35.6 % — SIGNIFICANT CHANGE UP (ref 13–44)
MANUAL SMEAR VERIFICATION: SIGNIFICANT CHANGE UP
MCHC RBC-ENTMCNC: 29.5 PG — SIGNIFICANT CHANGE UP (ref 27–34)
MCHC RBC-ENTMCNC: 32.9 GM/DL — SIGNIFICANT CHANGE UP (ref 32–36)
MCV RBC AUTO: 89.7 FL — SIGNIFICANT CHANGE UP (ref 80–100)
MONOCYTES # BLD AUTO: 0.07 K/UL — SIGNIFICANT CHANGE UP (ref 0–0.9)
MONOCYTES NFR BLD AUTO: 0.9 % — LOW (ref 2–14)
NEUTROPHILS # BLD AUTO: 4.95 K/UL — SIGNIFICANT CHANGE UP (ref 1.8–7.4)
NEUTROPHILS NFR BLD AUTO: 61.7 % — SIGNIFICANT CHANGE UP (ref 43–77)
NITRITE UR-MCNC: NEGATIVE — SIGNIFICANT CHANGE UP
OVALOCYTES BLD QL SMEAR: SLIGHT — SIGNIFICANT CHANGE UP
PH UR: 6.5 — SIGNIFICANT CHANGE UP (ref 5–8)
PLAT MORPH BLD: NORMAL — SIGNIFICANT CHANGE UP
PLATELET # BLD AUTO: 190 K/UL — SIGNIFICANT CHANGE UP (ref 150–400)
POIKILOCYTOSIS BLD QL AUTO: SLIGHT — SIGNIFICANT CHANGE UP
POLYCHROMASIA BLD QL SMEAR: SIGNIFICANT CHANGE UP
POTASSIUM SERPL-MCNC: 4.1 MMOL/L — SIGNIFICANT CHANGE UP (ref 3.5–5.3)
POTASSIUM SERPL-SCNC: 4.1 MMOL/L — SIGNIFICANT CHANGE UP (ref 3.5–5.3)
PROT SERPL-MCNC: 9.4 G/DL — HIGH (ref 6.6–8.7)
PROT UR-MCNC: NEGATIVE — SIGNIFICANT CHANGE UP
RAPID RVP RESULT: SIGNIFICANT CHANGE UP
RBC # BLD: 3.87 M/UL — SIGNIFICANT CHANGE UP (ref 3.8–5.2)
RBC # FLD: 12.4 % — SIGNIFICANT CHANGE UP (ref 10.3–14.5)
RBC BLD AUTO: ABNORMAL
RBC CASTS # UR COMP ASSIST: SIGNIFICANT CHANGE UP /HPF (ref 0–4)
SARS-COV-2 RNA SPEC QL NAA+PROBE: SIGNIFICANT CHANGE UP
SCHISTOCYTES BLD QL AUTO: SLIGHT — SIGNIFICANT CHANGE UP
SODIUM SERPL-SCNC: 133 MMOL/L — LOW (ref 135–145)
SP GR SPEC: 1 — LOW (ref 1.01–1.02)
UROBILINOGEN FLD QL: NEGATIVE MG/DL — SIGNIFICANT CHANGE UP
WBC # BLD: 8.03 K/UL — SIGNIFICANT CHANGE UP (ref 3.8–10.5)
WBC # FLD AUTO: 8.03 K/UL — SIGNIFICANT CHANGE UP (ref 3.8–10.5)
WBC UR QL: SIGNIFICANT CHANGE UP /HPF (ref 0–5)

## 2022-06-10 PROCEDURE — 99285 EMERGENCY DEPT VISIT HI MDM: CPT | Mod: 25

## 2022-06-10 PROCEDURE — 85025 COMPLETE CBC W/AUTO DIFF WBC: CPT

## 2022-06-10 PROCEDURE — 81001 URINALYSIS AUTO W/SCOPE: CPT

## 2022-06-10 PROCEDURE — 87086 URINE CULTURE/COLONY COUNT: CPT

## 2022-06-10 PROCEDURE — 71045 X-RAY EXAM CHEST 1 VIEW: CPT

## 2022-06-10 PROCEDURE — 36415 COLL VENOUS BLD VENIPUNCTURE: CPT

## 2022-06-10 PROCEDURE — 0225U NFCT DS DNA&RNA 21 SARSCOV2: CPT

## 2022-06-10 PROCEDURE — 87040 BLOOD CULTURE FOR BACTERIA: CPT

## 2022-06-10 PROCEDURE — 71045 X-RAY EXAM CHEST 1 VIEW: CPT | Mod: 26

## 2022-06-10 PROCEDURE — 80053 COMPREHEN METABOLIC PANEL: CPT

## 2022-06-10 PROCEDURE — 99284 EMERGENCY DEPT VISIT MOD MDM: CPT

## 2022-06-10 RX ORDER — AZITHROMYCIN 500 MG/1
1 TABLET, FILM COATED ORAL
Qty: 5 | Refills: 0
Start: 2022-06-10 | End: 2022-06-14

## 2022-06-10 RX ORDER — SODIUM CHLORIDE 9 MG/ML
1000 INJECTION INTRAMUSCULAR; INTRAVENOUS; SUBCUTANEOUS ONCE
Refills: 0 | Status: COMPLETED | OUTPATIENT
Start: 2022-06-10 | End: 2022-06-10

## 2022-06-10 RX ORDER — AZITHROMYCIN 500 MG/1
500 TABLET, FILM COATED ORAL ONCE
Refills: 0 | Status: COMPLETED | OUTPATIENT
Start: 2022-06-10 | End: 2022-06-10

## 2022-06-10 RX ORDER — CEFPODOXIME PROXETIL 100 MG
1 TABLET ORAL
Qty: 10 | Refills: 0
Start: 2022-06-10 | End: 2022-06-14

## 2022-06-10 RX ORDER — ACETAMINOPHEN 500 MG
650 TABLET ORAL ONCE
Refills: 0 | Status: COMPLETED | OUTPATIENT
Start: 2022-06-10 | End: 2022-06-10

## 2022-06-10 RX ORDER — CEFPODOXIME PROXETIL 100 MG
200 TABLET ORAL ONCE
Refills: 0 | Status: COMPLETED | OUTPATIENT
Start: 2022-06-10 | End: 2022-06-10

## 2022-06-10 RX ADMIN — Medication 650 MILLIGRAM(S): at 21:22

## 2022-06-10 RX ADMIN — AZITHROMYCIN 500 MILLIGRAM(S): 500 TABLET, FILM COATED ORAL at 23:11

## 2022-06-10 RX ADMIN — SODIUM CHLORIDE 1000 MILLILITER(S): 9 INJECTION INTRAMUSCULAR; INTRAVENOUS; SUBCUTANEOUS at 21:24

## 2022-06-10 RX ADMIN — Medication 200 MILLIGRAM(S): at 23:11

## 2022-06-10 NOTE — ED PROVIDER NOTE - CARE PROVIDER_API CALL
Yang Giles; PhD)  Neurology; Vascular Neurology  [address] [place] [address]  Phone: [phone]  Fax: [phone]  Follow Up Time:

## 2022-06-10 NOTE — ED PROVIDER NOTE - PROGRESS NOTE DETAILS
POLO- pt noted to have PET scan yesterday which showed groundglass opacities in the lower lowers of the lungs, will treat for PNA, Pt reassessed, pt feeling better at this time, vss, pt able to walk, talk and vocalized plan of action. Discussed in depth and explained to pt in depth the next steps that need to be taking including proper follow up with PCP or specialists. All incidental findings were discussed with pt as well. Pt verbalized their concerns and all questions were answered. Pt understands dispo and wants discharge. Given good instructions when to return to ED and importance of f/u. POLO- pt notes continued lower extremity pain from her MM, wants referral for neurologist

## 2022-06-10 NOTE — ED PROVIDER NOTE - CLINICAL SUMMARY MEDICAL DECISION MAKING FREE TEXT BOX
66 y/o female with hx of HTN, recently diagnosed with multiple myeloma presents to the ED c/o fever, cough and urinary symptoms. will obtain bloodwork, blood cultures, xray, urinalysis, anti-pyretic and reassess

## 2022-06-10 NOTE — ED ADULT NURSE NOTE - OBJECTIVE STATEMENT
pt a&ox3, vss, came in w/ c/o fever and cough x1 day. pt was diagnosed w/ multiple myeloma on Wednesday and was told by her oncologist to come to ED to r/o infection. pt denies ha, cp or n/v/d. pt not currently on chemo or radiation due to new diagnosis. respirations even and unlabored. pt in NAD. POC discussed w/ patient, safety maintained. will continue to monitor.

## 2022-06-10 NOTE — ED PROVIDER NOTE - NSFOLLOWUPINSTRUCTIONS_ED_ALL_ED_FT
Fever    A fever is an increase in the body's temperature above 100.4°F (38°C) or higher. In adults and children older than three months, a brief mild or moderate fever generally has no long-term effect, and it usually does not require treatment. Many times, fevers are the result of viral infections, which are self-resolving.  However, certain symptoms or diagnostic tests may suggest a bacterial infection that may respond to antibiotics. Take medications as directed by your health care provider.    SEEK IMMEDIATE MEDICAL CARE IF YOU OR YOUR CHILD HAVE ANY OF THE FOLLOWING SYMPTOMS : shortness of breath, seizure, rash/stiff neck/headache, severe abdominal pain, persistent vomiting, any signs of dehydration, or if your child has a fever for over five (5) days.    Pneumonia    Pneumonia is an infection of the lungs. Pneumonia may be caused by bacteria, viruses, or funguses. Symptoms include coughing, fever, chest pain when breathing deeply or coughing, shortness of breath, fatigue, or muscle aches. Pneumonia can be diagnosed with a medical history and physical exam, as well as other tests which may include a chest X-ray. If you were prescribed an antibiotic medicine, take it as told by your health care provider and do not stop taking the antibiotic even if you start to feel better. Do not use tobacco products, including cigarettes, chewing tobacco, and e-cigarettes.    SEEK IMMEDIATE MEDICAL CARE IF YOU HAVE ANY OF THE FOLLOWING SYMPTOMS: worsening shortness of breath, worsening chest pain, coughing up blood, change in mental status, lightheadedness/dizziness.

## 2022-06-10 NOTE — ED PROVIDER NOTE - ATTENDING APP SHARED VISIT CONTRIBUTION OF CARE
I, Ramin Carranza, have personally performed a face to face diagnostic evaluation on this patient. I have reviewed the BILL note and agree with the history, exam and plan of care, except as noted.    66 yo F recently diagnosed with multiple myeloma send in by oncologist for fever. Patient report cough x 2 days and occasional dysuria. no respiratory distress. no hypoxia. wbc 8.03. UA negaive.  rvp negative. cxr showed jethro infiltrate. abx given for pneumonia.

## 2022-06-10 NOTE — ED PROVIDER NOTE - OBJECTIVE STATEMENT
68 y/o female with hx of HTN, recently diagnosed with multiple myeloma presents to the ED c/o fever, cough and urinary symptoms. Symptoms began 2 days ago, Was seen by her oncologist today and when she had a fever he sent to the ED for evaluation. Mild nonproductive cough. fever with tmax of 102 at home. Mild burning with urination. Denies abdominal pain, chest pain, sob, lightheadedness, dizziness.

## 2022-06-10 NOTE — ED PROVIDER NOTE - NS ED ATTENDING STATEMENT MOD
This was a shared visit with the BILL. I reviewed and verified the documentation and independently performed the documented:

## 2022-06-10 NOTE — ED PROVIDER NOTE - PATIENT PORTAL LINK FT
You can access the FollowMyHealth Patient Portal offered by [facility] by registering at the following website: [email or web address]. By joining [facility]’s FollowMyHealth portal, you will also be able to view your health information using other applications (apps) compatible with our system.

## 2022-06-11 LAB
CULTURE RESULTS: SIGNIFICANT CHANGE UP
SPECIMEN SOURCE: SIGNIFICANT CHANGE UP

## 2022-06-13 PROBLEM — C90.00 MULTIPLE MYELOMA NOT HAVING ACHIEVED REMISSION: Chronic | Status: ACTIVE | Noted: 2022-06-10

## 2022-06-22 ENCOUNTER — APPOINTMENT (OUTPATIENT)
Dept: NEUROLOGY | Facility: CLINIC | Age: 67
End: 2022-06-22
Payer: MEDICARE

## 2022-06-22 ENCOUNTER — INPATIENT (INPATIENT)
Facility: HOSPITAL | Age: 67
LOS: 8 days | Discharge: HOME | DRG: 871 | End: 2022-07-01
Attending: INTERNAL MEDICINE | Admitting: STUDENT IN AN ORGANIZED HEALTH CARE EDUCATION/TRAINING PROGRAM
Payer: COMMERCIAL

## 2022-06-22 VITALS
RESPIRATION RATE: 20 BRPM | HEIGHT: 62 IN | TEMPERATURE: 100 F | DIASTOLIC BLOOD PRESSURE: 89 MMHG | WEIGHT: 104.06 LBS | SYSTOLIC BLOOD PRESSURE: 159 MMHG | HEART RATE: 108 BPM

## 2022-06-22 VITALS
HEIGHT: 66 IN | SYSTOLIC BLOOD PRESSURE: 112 MMHG | WEIGHT: 104 LBS | DIASTOLIC BLOOD PRESSURE: 64 MMHG | BODY MASS INDEX: 16.71 KG/M2

## 2022-06-22 DIAGNOSIS — Z85.79 PERSONAL HISTORY OF OTHER MALIGNANT NEOPLASMS OF LYMPHOID, HEMATOPOIETIC AND RELATED TISSUES: ICD-10-CM

## 2022-06-22 DIAGNOSIS — G62.9 POLYNEUROPATHY, UNSPECIFIED: ICD-10-CM

## 2022-06-22 DIAGNOSIS — J18.9 PNEUMONIA, UNSPECIFIED ORGANISM: ICD-10-CM

## 2022-06-22 DIAGNOSIS — M54.16 RADICULOPATHY, LUMBAR REGION: ICD-10-CM

## 2022-06-22 LAB
ALBUMIN SERPL ELPH-MCNC: 3.2 G/DL — LOW (ref 3.3–5)
ALP SERPL-CCNC: 73 U/L — SIGNIFICANT CHANGE UP (ref 40–120)
ALT FLD-CCNC: 14 U/L — SIGNIFICANT CHANGE UP (ref 10–45)
ANION GAP SERPL CALC-SCNC: 6 MMOL/L — SIGNIFICANT CHANGE UP (ref 5–17)
APPEARANCE UR: CLEAR — SIGNIFICANT CHANGE UP
AST SERPL-CCNC: 18 U/L — SIGNIFICANT CHANGE UP (ref 10–40)
BACTERIA # UR AUTO: ABNORMAL
BASE EXCESS BLDV CALC-SCNC: 7.1 MMOL/L — HIGH (ref -2–2)
BASOPHILS # BLD AUTO: 0 K/UL — SIGNIFICANT CHANGE UP (ref 0–0.2)
BASOPHILS NFR BLD AUTO: 0 % — SIGNIFICANT CHANGE UP (ref 0–2)
BILIRUB SERPL-MCNC: 0.5 MG/DL — SIGNIFICANT CHANGE UP (ref 0.2–1.2)
BILIRUB UR-MCNC: NEGATIVE — SIGNIFICANT CHANGE UP
BUN SERPL-MCNC: 10 MG/DL — SIGNIFICANT CHANGE UP (ref 7–23)
CA-I SERPL-SCNC: 1.19 MMOL/L — SIGNIFICANT CHANGE UP (ref 1.15–1.33)
CALCIUM SERPL-MCNC: 8.7 MG/DL — SIGNIFICANT CHANGE UP (ref 8.4–10.5)
CHLORIDE BLDV-SCNC: 100 MMOL/L — SIGNIFICANT CHANGE UP (ref 96–108)
CHLORIDE SERPL-SCNC: 99 MMOL/L — SIGNIFICANT CHANGE UP (ref 96–108)
CO2 BLDV-SCNC: 34 MMOL/L — HIGH (ref 22–26)
CO2 SERPL-SCNC: 28 MMOL/L — SIGNIFICANT CHANGE UP (ref 22–31)
COLOR SPEC: SIGNIFICANT CHANGE UP
CREAT SERPL-MCNC: 0.7 MG/DL — SIGNIFICANT CHANGE UP (ref 0.5–1.3)
DIFF PNL FLD: NEGATIVE — SIGNIFICANT CHANGE UP
EGFR: 95 ML/MIN/1.73M2 — SIGNIFICANT CHANGE UP
EOSINOPHIL # BLD AUTO: 0.07 K/UL — SIGNIFICANT CHANGE UP (ref 0–0.5)
EOSINOPHIL NFR BLD AUTO: 0.8 % — SIGNIFICANT CHANGE UP (ref 0–6)
EPI CELLS # UR: 3 /HPF — SIGNIFICANT CHANGE UP
GAS PNL BLDV: 134 MMOL/L — LOW (ref 136–145)
GAS PNL BLDV: SIGNIFICANT CHANGE UP
GAS PNL BLDV: SIGNIFICANT CHANGE UP
GLUCOSE BLDV-MCNC: 104 MG/DL — HIGH (ref 70–99)
GLUCOSE SERPL-MCNC: 99 MG/DL — SIGNIFICANT CHANGE UP (ref 70–99)
GLUCOSE UR QL: NEGATIVE — SIGNIFICANT CHANGE UP
HCO3 BLDV-SCNC: 32 MMOL/L — HIGH (ref 22–29)
HCT VFR BLD CALC: 32.6 % — LOW (ref 34.5–45)
HCT VFR BLDA CALC: 33 % — LOW (ref 34.5–46.5)
HGB BLD CALC-MCNC: 11 G/DL — LOW (ref 11.7–16.1)
HGB BLD-MCNC: 10.6 G/DL — LOW (ref 11.5–15.5)
HYALINE CASTS # UR AUTO: 1 /LPF — SIGNIFICANT CHANGE UP (ref 0–2)
KETONES UR-MCNC: NEGATIVE — SIGNIFICANT CHANGE UP
LACTATE BLDV-MCNC: 0.9 MMOL/L — SIGNIFICANT CHANGE UP (ref 0.7–2)
LEUKOCYTE ESTERASE UR-ACNC: ABNORMAL
LYMPHOCYTES # BLD AUTO: 2.15 K/UL — SIGNIFICANT CHANGE UP (ref 1–3.3)
LYMPHOCYTES # BLD AUTO: 25.2 % — SIGNIFICANT CHANGE UP (ref 13–44)
MANUAL SMEAR VERIFICATION: SIGNIFICANT CHANGE UP
MCHC RBC-ENTMCNC: 29 PG — SIGNIFICANT CHANGE UP (ref 27–34)
MCHC RBC-ENTMCNC: 32.5 GM/DL — SIGNIFICANT CHANGE UP (ref 32–36)
MCV RBC AUTO: 89.1 FL — SIGNIFICANT CHANGE UP (ref 80–100)
MONOCYTES # BLD AUTO: 0.43 K/UL — SIGNIFICANT CHANGE UP (ref 0–0.9)
MONOCYTES NFR BLD AUTO: 5 % — SIGNIFICANT CHANGE UP (ref 2–14)
NEUTROPHILS # BLD AUTO: 5.24 K/UL — SIGNIFICANT CHANGE UP (ref 1.8–7.4)
NEUTROPHILS NFR BLD AUTO: 61.4 % — SIGNIFICANT CHANGE UP (ref 43–77)
NITRITE UR-MCNC: NEGATIVE — SIGNIFICANT CHANGE UP
PCO2 BLDV: 49 MMHG — HIGH (ref 39–42)
PH BLDV: 7.43 — SIGNIFICANT CHANGE UP (ref 7.32–7.43)
PH UR: 6.5 — SIGNIFICANT CHANGE UP (ref 5–8)
PLAT MORPH BLD: NORMAL — SIGNIFICANT CHANGE UP
PLATELET # BLD AUTO: 296 K/UL — SIGNIFICANT CHANGE UP (ref 150–400)
PO2 BLDV: 31 MMHG — SIGNIFICANT CHANGE UP (ref 25–45)
POTASSIUM BLDV-SCNC: 3.6 MMOL/L — SIGNIFICANT CHANGE UP (ref 3.5–5.1)
POTASSIUM SERPL-MCNC: 3.5 MMOL/L — SIGNIFICANT CHANGE UP (ref 3.5–5.3)
POTASSIUM SERPL-SCNC: 3.5 MMOL/L — SIGNIFICANT CHANGE UP (ref 3.5–5.3)
PROT SERPL-MCNC: 9.6 G/DL — HIGH (ref 6–8.3)
PROT UR-MCNC: SIGNIFICANT CHANGE UP
RBC # BLD: 3.66 M/UL — LOW (ref 3.8–5.2)
RBC # FLD: 12.3 % — SIGNIFICANT CHANGE UP (ref 10.3–14.5)
RBC BLD AUTO: SIGNIFICANT CHANGE UP
RBC CASTS # UR COMP ASSIST: 1 /HPF — SIGNIFICANT CHANGE UP (ref 0–4)
SAO2 % BLDV: 52.1 % — LOW (ref 67–88)
SODIUM SERPL-SCNC: 133 MMOL/L — LOW (ref 135–145)
SP GR SPEC: 1.01 — SIGNIFICANT CHANGE UP (ref 1.01–1.02)
UROBILINOGEN FLD QL: NEGATIVE — SIGNIFICANT CHANGE UP
VARIANT LYMPHS # BLD: 7.6 % — HIGH (ref 0–6)
WBC # BLD: 8.53 K/UL — SIGNIFICANT CHANGE UP (ref 3.8–10.5)
WBC # FLD AUTO: 8.53 K/UL — SIGNIFICANT CHANGE UP (ref 3.8–10.5)
WBC UR QL: 10 /HPF — HIGH (ref 0–5)

## 2022-06-22 PROCEDURE — 99285 EMERGENCY DEPT VISIT HI MDM: CPT

## 2022-06-22 PROCEDURE — 71045 X-RAY EXAM CHEST 1 VIEW: CPT | Mod: 26

## 2022-06-22 PROCEDURE — 99204 OFFICE O/P NEW MOD 45 MIN: CPT

## 2022-06-22 RX ORDER — GABAPENTIN 300 MG/1
300 CAPSULE ORAL TWICE DAILY
Qty: 180 | Refills: 0 | Status: ACTIVE | COMMUNITY
Start: 2022-06-22 | End: 1900-01-01

## 2022-06-22 RX ORDER — METHYLPREDNISOLONE 4 MG/1
4 TABLET ORAL
Qty: 21 | Refills: 0 | Status: COMPLETED | COMMUNITY
Start: 2022-05-18

## 2022-06-22 RX ORDER — CEFPODOXIME PROXETIL 200 MG/1
200 TABLET, FILM COATED ORAL
Qty: 10 | Refills: 0 | Status: COMPLETED | COMMUNITY
Start: 2022-06-10

## 2022-06-22 RX ORDER — VANCOMYCIN HCL 1 G
1000 VIAL (EA) INTRAVENOUS ONCE
Refills: 0 | Status: COMPLETED | OUTPATIENT
Start: 2022-06-22 | End: 2022-06-22

## 2022-06-22 RX ORDER — ACETAMINOPHEN 500 MG
975 TABLET ORAL ONCE
Refills: 0 | Status: COMPLETED | OUTPATIENT
Start: 2022-06-22 | End: 2022-06-22

## 2022-06-22 RX ORDER — VALSARTAN 160 MG/1
160 TABLET, COATED ORAL
Qty: 90 | Refills: 0 | Status: ACTIVE | COMMUNITY
Start: 2022-06-20

## 2022-06-22 RX ORDER — MUPIROCIN 20 MG/G
2 OINTMENT TOPICAL
Qty: 22 | Refills: 0 | Status: COMPLETED | COMMUNITY
Start: 2022-05-18

## 2022-06-22 RX ORDER — AMOXICILLIN AND CLAVULANATE POTASSIUM 875; 125 MG/1; MG/1
875-125 TABLET, COATED ORAL
Qty: 20 | Refills: 0 | Status: COMPLETED | COMMUNITY
Start: 2022-05-18

## 2022-06-22 RX ORDER — SODIUM CHLORIDE 9 MG/ML
500 INJECTION INTRAMUSCULAR; INTRAVENOUS; SUBCUTANEOUS ONCE
Refills: 0 | Status: COMPLETED | OUTPATIENT
Start: 2022-06-22 | End: 2022-06-22

## 2022-06-22 RX ORDER — ESCITALOPRAM OXALATE 5 MG/1
5 TABLET ORAL
Qty: 90 | Refills: 0 | Status: ACTIVE | COMMUNITY
Start: 2022-05-20

## 2022-06-22 RX ORDER — CEFEPIME 1 G/1
1000 INJECTION, POWDER, FOR SOLUTION INTRAMUSCULAR; INTRAVENOUS ONCE
Refills: 0 | Status: COMPLETED | OUTPATIENT
Start: 2022-06-22 | End: 2022-06-22

## 2022-06-22 RX ORDER — AZITHROMYCIN 250 MG/1
250 TABLET, FILM COATED ORAL
Qty: 5 | Refills: 0 | Status: COMPLETED | COMMUNITY
Start: 2022-06-10

## 2022-06-22 RX ADMIN — Medication 975 MILLIGRAM(S): at 21:55

## 2022-06-22 RX ADMIN — Medication 975 MILLIGRAM(S): at 22:00

## 2022-06-22 RX ADMIN — CEFEPIME 100 MILLIGRAM(S): 1 INJECTION, POWDER, FOR SOLUTION INTRAMUSCULAR; INTRAVENOUS at 23:45

## 2022-06-22 RX ADMIN — SODIUM CHLORIDE 500 MILLILITER(S): 9 INJECTION INTRAMUSCULAR; INTRAVENOUS; SUBCUTANEOUS at 21:24

## 2022-06-22 NOTE — ED PROVIDER NOTE - OBJECTIVE STATEMENT
66 yo female with PMHx of multiple myeloma (not on chemo), HTN p/w fever.  Patient reports that she has had fever, cough and body aches since yesterday.  Tmax 102.  Patient states that she was seen at [facility] 2 weeks ago with similar symptoms.  Was diagnosed with pneumonia and discharged home on abx.  Patient initially felt better, but symptoms never completely resolved.   tested positive for covid yesterday.  Took home covid test that was negative miquel.  Denies CP, abd pain, NVD, dysuria

## 2022-06-22 NOTE — ED PROVIDER NOTE - CLINICAL SUMMARY MEDICAL DECISION MAKING FREE TEXT BOX
See attending statement below Zach Bruce MD, FACEP: In this physician's medical judgement based on clinical history and physical exam: patient with hypoxia and signs and symptoms of clinical pneumonia, supplemental oxygen, iv, cbc, cmp, ce, cxr, fluids prn, antibiotics and will admit.  See attending statement below

## 2022-06-22 NOTE — ASSESSMENT
[FreeTextEntry1] : This is a 67-year-old woman with multiple myeloma.\par She has been having neuropathic type pains in both legs.\par \par Lumbar spine imaging demonstrates some degenerative changes and disc bulge at L4/5.\par \par I would like to obtain EMG and nerve conduction studies to assess for peripheral neuropathy as this can be associated with multiple myeloma.\par \par I will refer her to physical therapy.\par \par She has been on gabapentin 100 mg twice per day with no improvement.  I will increase this to 300 mg twice per day.\par \par I will see her back after the EMG.

## 2022-06-22 NOTE — ED PROVIDER NOTE - ATTENDING APP SHARED VISIT CONTRIBUTION OF CARE
Zach Bruce MD, FACEP: In this physician's medical judgement based on clinical history and physical exam: patient with recent diagnosis of MM, now presents with fevers yesterday after known bilateral basilar pneumonia treated at [facility], patient has sick contact in COVID-19 with her  as of yesterday.  concern for viral illness consistent with COVID-19 infection  patient hypoxic on ambulation to 88-89% on RA  pending labs, oxygen prn, rvp  Will follow up on labs, analgesia, imaging, reassess and disposition as clinically indicated.  *The above represents an initial assessment/impression. Please refer to my progress notes below for potential changes in patient clinical course*   will likely admit secondary to hypoxia on ambulation Zach Bruce MD, FACEP: In this physician's medical judgement based on clinical history and physical exam: patient with recent diagnosis of MM, now presents with fevers yesterday after known bilateral basilar pneumonia treated at [facility], patient has sick contact in COVID-19 with her  as of yesterday.  concern for viral illness consistent with COVID-19 infection  patient hypoxic on ambulation to 88-89% on RA  pending labs, oxygen prn, rvp  Will follow up on labs, analgesia, imaging, reassess and disposition as clinically indicated.  *The above represents an initial assessment/impression. Please refer to my progress notes below for potential changes in patient clinical course*   will likely admit secondary to hypoxia on ambulation  Patient endorsed to Dr. Benton at the time of admission. Based on patient's history and physical exam, as well as the results of today's workup, I feel that patient warrants admission to the hospital for further workup/evaluation and continued management. I discussed the findings of today's workup with the patient and addressed the patient's questions and concerns. The patient was agreeable with admission. Our team spoke with the inpatient receiving team who accepted the patient for admission and subsequently took over the patient's care at the time of admission. The receiving team will follow up on pending labs, analgesia, any clinical imaging results, ancillary findings, reassess, and disposition as clinically indicated. Details of patient and plan conveyed to receiving physician team and conveyed back for understanding. There were no questions at this time about the patient's status, disposition, and plan. Patient's care to be taken over by receiving physician team at this time, all decisions regarding the progression of care will be made at their discretion.

## 2022-06-22 NOTE — ED ADULT TRIAGE NOTE - CHIEF COMPLAINT QUOTE
fevers since yesterday, stuffy nose, cough   + for COVID, denies SOB  recently diagnosed with multiple myeloma

## 2022-06-22 NOTE — PHYSICAL EXAM
[General Appearance - Alert] : alert [General Appearance - In No Acute Distress] : in no acute distress [Oriented To Time, Place, And Person] : oriented to person, place, and time [Affect] : the affect was normal [Memory Recent] : recent memory was not impaired [Memory Remote] : remote memory was not impaired [Cranial Nerves Optic (II)] : visual acuity intact bilaterally,  visual fields full to confrontation, pupils equal round and reactive to light [Cranial Nerves Oculomotor (III)] : extraocular motion intact [Cranial Nerves Trigeminal (V)] : facial sensation intact symmetrically [Cranial Nerves Facial (VII)] : face symmetrical [Cranial Nerves Vestibulocochlear (VIII)] : hearing was intact bilaterally [Cranial Nerves Glossopharyngeal (IX)] : tongue and palate midline [Cranial Nerves Accessory (XI - Cranial And Spinal)] : head turning and shoulder shrug symmetric [Cranial Nerves Hypoglossal (XII)] : there was no tongue deviation with protrusion [Motor Tone] : muscle tone was normal in all four extremities [Motor Strength] : muscle strength was normal in all four extremities [Sensation Tactile Decrease] : light touch was intact [Sensation Pain / Temperature Decrease] : pain and temperature was intact [Abnormal Walk] : normal gait [2+] : Ankle jerk left 2+ [Optic Disc Abnormality] : the optic disc were normal in size and color [Dysarthria] : no dysarthria [Aphasia] : no dysphasia/aphasia [Romberg's Sign] : Romberg's sign was negtive [Coordination - Dysmetria Impaired Finger-to-Nose Bilateral] : not present [Plantar Reflex Right Only] : normal on the right [Plantar Reflex Left Only] : normal on the left

## 2022-06-22 NOTE — ED ADULT NURSE NOTE - OBJECTIVE STATEMENT
67 y.o female c/o fever, chills and cough intermittent x few weeks. States cough has worsened over the past few days.  COVID (+), pt tested negative with at home COVID test yesterday. Recently diagnosed with multiple myeloma on 6/10 but has not started treatment yet. Endorses L leg pain which is chronic, pt took Gabapentin as prescribed this AM. Denies SOB, CP, NVD, HA, weakness. Last took Tylenol at 1100am. PMH HTN

## 2022-06-22 NOTE — DATA REVIEWED
[de-identified] : Full body PET/CT tumor imaging was performed on 6/9/2022.  There were some nonspecific abnormalities in the lung fields.  There are stable degenerative changes in the L4 vertebral body.  (As compared to CT scan of the pelvis from 6/16/2020). [de-identified] : Cervical spine MRI was performed on 6/10/2022 at [facility].\par The study demonstrated broad-based central disc at C6/7 as well as very small central disks at C2/3, C3/4, and C7/T1.  There was no spinal stenosis.\par \par Thoracic spine MRI was performed on the same date at the same institution.\par The study demonstrated left paracentral disc bulging at T6/7 as well as mild bulging at T11/12 and T10/11.\par \par Lumbar spine MRI was performed on 6/13/2022 at [facility].\par There was multilevel degenerative change.  There was some straightening of the normal lumbar curve consistent with muscle spasm.  There was diffuse disc bulging at L4/5 which was both central and somewhat rightward.  There was impingement on the descending L5 nerve root sheaths bilaterally.  There is severe spondylosis at L5/S1.\par \par

## 2022-06-22 NOTE — HISTORY OF PRESENT ILLNESS
[FreeTextEntry1] : I saw this patient in the office today.\par Her daughter was on speaker phone during the interview and evaluation.\par \par She was recently diagnosed with multiple myeloma but is not currently on treatment.\par For the past few months she has been having pains in the legs and feet.\par She describes burning and pinching type pains radiating up as high as the mid thighs.\par She describes this as beginning in March 2022.\par Her daughter feels that it is somewhat more recent.\par \par

## 2022-06-23 DIAGNOSIS — I10 ESSENTIAL (PRIMARY) HYPERTENSION: ICD-10-CM

## 2022-06-23 DIAGNOSIS — C90.00 MULTIPLE MYELOMA NOT HAVING ACHIEVED REMISSION: ICD-10-CM

## 2022-06-23 DIAGNOSIS — Z29.9 ENCOUNTER FOR PROPHYLACTIC MEASURES, UNSPECIFIED: ICD-10-CM

## 2022-06-23 DIAGNOSIS — A41.9 SEPSIS, UNSPECIFIED ORGANISM: ICD-10-CM

## 2022-06-23 LAB
MRSA PCR RESULT.: SIGNIFICANT CHANGE UP
RAPID RVP RESULT: SIGNIFICANT CHANGE UP
S AUREUS DNA NOSE QL NAA+PROBE: SIGNIFICANT CHANGE UP
SARS-COV-2 RNA SPEC QL NAA+PROBE: SIGNIFICANT CHANGE UP

## 2022-06-23 PROCEDURE — 99223 1ST HOSP IP/OBS HIGH 75: CPT | Mod: GC

## 2022-06-23 RX ORDER — CEFEPIME 1 G/1
2000 INJECTION, POWDER, FOR SOLUTION INTRAMUSCULAR; INTRAVENOUS EVERY 12 HOURS
Refills: 0 | Status: DISCONTINUED | OUTPATIENT
Start: 2022-06-23 | End: 2022-06-25

## 2022-06-23 RX ORDER — VALSARTAN 80 MG/1
1 TABLET ORAL
Qty: 0 | Refills: 0 | DISCHARGE

## 2022-06-23 RX ORDER — CEFEPIME 1 G/1
2000 INJECTION, POWDER, FOR SOLUTION INTRAMUSCULAR; INTRAVENOUS ONCE
Refills: 0 | Status: COMPLETED | OUTPATIENT
Start: 2022-06-23 | End: 2022-06-23

## 2022-06-23 RX ORDER — ESCITALOPRAM OXALATE 10 MG/1
5 TABLET, FILM COATED ORAL DAILY
Refills: 0 | Status: DISCONTINUED | OUTPATIENT
Start: 2022-06-23 | End: 2022-07-01

## 2022-06-23 RX ORDER — GABAPENTIN 400 MG/1
300 CAPSULE ORAL
Refills: 0 | Status: DISCONTINUED | OUTPATIENT
Start: 2022-06-23 | End: 2022-06-25

## 2022-06-23 RX ORDER — ENOXAPARIN SODIUM 100 MG/ML
40 INJECTION SUBCUTANEOUS EVERY 24 HOURS
Refills: 0 | Status: DISCONTINUED | OUTPATIENT
Start: 2022-06-23 | End: 2022-07-01

## 2022-06-23 RX ORDER — DIPHENHYDRAMINE HCL 50 MG
25 CAPSULE ORAL ONCE
Refills: 0 | Status: COMPLETED | OUTPATIENT
Start: 2022-06-23 | End: 2022-06-23

## 2022-06-23 RX ORDER — ESCITALOPRAM OXALATE 10 MG/1
1 TABLET, FILM COATED ORAL
Qty: 0 | Refills: 0 | DISCHARGE

## 2022-06-23 RX ORDER — SODIUM CHLORIDE 9 MG/ML
1000 INJECTION, SOLUTION INTRAVENOUS ONCE
Refills: 0 | Status: COMPLETED | OUTPATIENT
Start: 2022-06-23 | End: 2022-06-23

## 2022-06-23 RX ORDER — CEFEPIME 1 G/1
INJECTION, POWDER, FOR SOLUTION INTRAMUSCULAR; INTRAVENOUS
Refills: 0 | Status: DISCONTINUED | OUTPATIENT
Start: 2022-06-23 | End: 2022-06-25

## 2022-06-23 RX ORDER — ACETAMINOPHEN 500 MG
650 TABLET ORAL ONCE
Refills: 0 | Status: COMPLETED | OUTPATIENT
Start: 2022-06-23 | End: 2022-06-23

## 2022-06-23 RX ORDER — ACETAMINOPHEN 500 MG
650 TABLET ORAL EVERY 6 HOURS
Refills: 0 | Status: DISCONTINUED | OUTPATIENT
Start: 2022-06-23 | End: 2022-07-01

## 2022-06-23 RX ORDER — VANCOMYCIN HCL 1 G
500 VIAL (EA) INTRAVENOUS EVERY 12 HOURS
Refills: 0 | Status: DISCONTINUED | OUTPATIENT
Start: 2022-06-23 | End: 2022-06-24

## 2022-06-23 RX ADMIN — SODIUM CHLORIDE 1000 MILLILITER(S): 9 INJECTION, SOLUTION INTRAVENOUS at 13:15

## 2022-06-23 RX ADMIN — Medication 650 MILLIGRAM(S): at 21:14

## 2022-06-23 RX ADMIN — ENOXAPARIN SODIUM 40 MILLIGRAM(S): 100 INJECTION SUBCUTANEOUS at 17:52

## 2022-06-23 RX ADMIN — Medication 650 MILLIGRAM(S): at 15:46

## 2022-06-23 RX ADMIN — Medication 650 MILLIGRAM(S): at 12:32

## 2022-06-23 RX ADMIN — Medication 100 MILLIGRAM(S): at 23:10

## 2022-06-23 RX ADMIN — CEFEPIME 100 MILLIGRAM(S): 1 INJECTION, POWDER, FOR SOLUTION INTRAMUSCULAR; INTRAVENOUS at 23:11

## 2022-06-23 RX ADMIN — Medication 250 MILLIGRAM(S): at 01:05

## 2022-06-23 RX ADMIN — CEFEPIME 100 MILLIGRAM(S): 1 INJECTION, POWDER, FOR SOLUTION INTRAMUSCULAR; INTRAVENOUS at 12:31

## 2022-06-23 RX ADMIN — Medication 650 MILLIGRAM(S): at 22:14

## 2022-06-23 RX ADMIN — Medication 650 MILLIGRAM(S): at 18:01

## 2022-06-23 RX ADMIN — GABAPENTIN 300 MILLIGRAM(S): 400 CAPSULE ORAL at 17:52

## 2022-06-23 RX ADMIN — Medication 100 MILLIGRAM(S): at 13:15

## 2022-06-23 RX ADMIN — Medication 25 MILLIGRAM(S): at 02:16

## 2022-06-23 NOTE — H&P ADULT - PROBLEM SELECTOR PLAN 1
-Having fever, cough and dysuria at home. Pt with Tmax of 100.8 and BP of 102/66 in the ED  -Sepsis likely 2/2 PNA vs UTI   -Pt discharged on 6/10 with cefpodoxime and azithromycin with no improvement in symptoms   -CXR shows new right upper lobe opacity concerning for pneumonia  -UA + for large leukocyte esterase, WBC and bacteria. Pt also symptomatic  -s/p Vanc and Cefepime In the ED. Will continue vanc and cefepime  -s/p 500cc bolus in the ED. WIll give patient 1L LR bolus   -Procal, BCx and UCx pending

## 2022-06-23 NOTE — H&P ADULT - NSHPPHYSICALEXAM_GEN_ALL_CORE
LOS: 1d    VITALS:   T(C): 38.1 (06-23-22 @ 12:42), Max: 38.2 (06-23-22 @ 09:56)  HR: 110 (06-23-22 @ 09:56) (71 - 110)  BP: 154/73 (06-23-22 @ 09:56) (102/66 - 159/89)  RR: 20 (06-23-22 @ 12:42) (18 - 23)  SpO2: 95% (06-23-22 @ 12:42) (89% - 96%)    GENERAL: NAD, lying in bed, coughing   HEAD:  Atraumatic, Normocephalic  EYES: EOMI, PERRLA, conjunctiva and sclera clear  ENT: Moist mucous membranes  NECK: Supple, No JVD  CHEST/LUNG: Clear to auscultation bilaterally; No rales, rhonchi, wheezing, or rubs. Unlabored respirations  HEART: Regular rate and rhythm; No murmurs, rubs, or gallops  ABDOMEN: BSx4; Soft, nontender, nondistended  EXTREMITIES:  2+ Peripheral Pulses, brisk capillary refill. No clubbing, cyanosis, or edema  NERVOUS SYSTEM:  A&Ox3, no focal deficits   SKIN: No rashes or lesions  Psych: Normal mood and affect

## 2022-06-23 NOTE — CONSULT NOTE ADULT - ASSESSMENT
68 yo female with PMHx of multiple myeloma (not on chemo), HTN p/w fever.  Patient reports that she has had fever, cough and body aches since yesterday.  Tmax 102.  Patient states that she was seen at [facility] 2 weeks ago with similar symptoms.  Was diagnosed with pneumonia and discharged home on abx.  Patient initially felt better, but symptoms never completely resolved.   tested positive for covid yesterday.  Took home covid test that was negative miquel.  Denies CP, abd pain, NVD, dysuria    Hematology/Oncology called to see patient who is well known to Dr. Munir Sam of [facility] for the management of IgG Lambda multiple myeloma. Skeletal survey showed two small lucencies in the femoral neck but PET/CT was normal and patient has normal renal function and minimal anemia. Her only complaint was peripheral neuropathy in her feet. However, secondary to rising kappa lambda FLC ratio, M protein and other high risk features, treatment was recommended (Dr. Sam recommending daratumumab, bortezomib lenalidomide and dexamethazone) that was tentatively scheduled to start on 7/5/2022. Patient was planning to get a second opinion at [facility] before starting treatment. When the patient was seen by Dr. Sam on 6/17, she was experiencing fevers to 102 and was referred to the ED.    IgG Lambda Multiple Myeloma  --Being followed by Dr. Munir Sam of [facility]  --Treatment was tentatively scheduled to start 7/5  --Pending second opinion at [facility]    Fever  --Recently treated for PNA  --CXR shows RUL opacity concerning for PNA  --Recent COVID exposure  --Patient received Cefepime and Vanco in ED  --Management per primary team    Anemia  --Mild, stable per labs taken at [facility] office  --Consistent with chronic disease - myeloma  --Please transfuse PRBC's for Hgb <7.0 grams    After discharge patient may resume care with Dr. Munir Sam of [facility].    Thank you for the opportunity to participate in Ms. Crespo's care.    Serjio Donis PA-C  Hematology/Oncology  [facility]   [phone] (office)  [phone] (alt office)  Evenings and weekends please call MD on call or office   66 yo female with PMHx of multiple myeloma (not on chemo), HTN p/w fever.  Patient reports that she has had fever, cough and body aches since yesterday.  Tmax 102.  Patient states that she was seen at [facility] 2 weeks ago with similar symptoms.  Was diagnosed with pneumonia and discharged home on abx.  Patient initially felt better, but symptoms never completely resolved.   tested positive for covid yesterday.  Took home covid test that was negative miquel.  Denies CP, abd pain, NVD, dysuria    Hematology/Oncology called to see patient who is well known to Dr. Munir Sam of [facility] for the management of IgG Lambda multiple myeloma. Skeletal survey showed two small lucencies in the femoral neck but PET/CT was normal and patient has normal renal function and minimal anemia. Her only complaint was peripheral neuropathy in her feet. However, secondary to rising kappa lambda FLC ratio, M protein and other high risk features, treatment was recommended (Dr. Sam recommending daratumumab, bortezomib lenalidomide and dexamethazone) that was tentatively scheduled to start on 7/5/2022. Patient was planning to get a second opinion at [facility] before starting treatment. When the patient was seen by Dr. Sam on 6/17, she was experiencing fevers to 102 and was referred to the ED.    IgG Lambda Multiple Myeloma  --Being followed by Dr. Munir Sam of [facility]  --Treatment was tentatively scheduled to start 7/5  --Pending second opinion at [facility] on 6/29/2022    Fever  --Recently treated for PNA  --CXR shows RUL opacity concerning for recurrent/persistent PNA  --Recent COVID exposure but testing negative  --Patient received Cefepime and Vanco in ED  --ID, Pulmonary consults may be of benefit  --Management per primary team    Anemia  --Mild, stable per labs taken at [facility] office  --Consistent with chronic disease - myeloma  --Please transfuse PRBC's for Hgb <7.0 grams    After discharge patient may resume care with Dr. Munir Sam of [facility].    Thank you for the opportunity to participate in Ms. Crespo's care.    Serjio Donis PA-C  Hematology/Oncology  [facility]   [phone] (office)  [phone] (alt office)  Evenings and weekends please call MD on call or office

## 2022-06-23 NOTE — H&P ADULT - NSHPREVIEWOFSYSTEMS_GEN_ALL_CORE
REVIEW OF SYSTEMS:    CONSTITUTIONAL: + fever No weakness or chills  EYES:  No visual changes, no eye pain   ENT: No vertigo or throat pain   NECK: No pain or stiffness  RESPIRATORY: + cough. No wheezing, hemoptysis; No shortness of breath  CARDIOVASCULAR: No chest pain or palpitations  GASTROINTESTINAL: No abdominal or epigastric pain. No nausea, vomiting, or hematemesis; No diarrhea or constipation. No melena or hematochezia.  GENITOURINARY: + dysuria. No frequency or hematuria  NEUROLOGICAL: No numbness or weakness  SKIN: No itching, rashes  Psych: no anxiety or depression

## 2022-06-23 NOTE — H&P ADULT - HISTORY OF PRESENT ILLNESS
66 yo female with PMHx of recently diagnosed multiple myeloma (not on chemo), HTN p/w fever and cough. She was seen at [facility] for similar symptoms 2 weeks ago and was discharged with cefpodoxime and azithromycin. Per pt, her symptoms did not resolve. She started having a non-productive cough yesterday and had a temp of 102. Pt  and mother tested positive for COVID 3 days ago. She took a home test today which was negative. She also endorses mild dysuria intermittently. Pt states that she has having low grade fever (99 to 100.1) for the past 2 months with left lower leg pain/numbness. She denies any chest pain, SOB, N/V, abdominal pain or dizziness.     In the ED, pt had a Tmax of 100.8, HR of 110, BP of 154/73 and saturating well on RA. In the ED, pt given 500cc bolus and Vanc + cefepime

## 2022-06-23 NOTE — H&P ADULT - PROBLEM SELECTOR PLAN 3
-Being followed by Dr. Munir Sam of [facility]  -Treatment was tentatively scheduled to start 7/5, Pending second opinion at [facility] on 6/29/2022  -Heme onc following

## 2022-06-23 NOTE — H&P ADULT - NSHPLABSRESULTS_GEN_ALL_CORE
.  LABS:                         10.6   8.53  )-----------( 296      ( 2022 21:33 )             32.6         133<L>  |  99  |  10  ----------------------------<  99  3.5   |  28  |  0.70    Ca    8.7      2022 21:33    TPro  9.6<H>  /  Alb  3.2<L>  /  TBili  0.5  /  DBili  x   /  AST  18  /  ALT  14  /  AlkPhos  73        Urinalysis Basic - ( 2022 21:32 )    Color: Light Yellow / Appearance: Clear / S.010 / pH: x  Gluc: x / Ketone: Negative  / Bili: Negative / Urobili: Negative   Blood: x / Protein: Trace / Nitrite: Negative   Leuk Esterase: Large / RBC: 1 /hpf / WBC 10 /HPF   Sq Epi: x / Non Sq Epi: 3 /hpf / Bacteria: Few            RADIOLOGY, EKG & ADDITIONAL TESTS: Reviewed.   < from: Xray Chest 1 View AP/PA (22 @ 21:17) >    TECHNIQUE: AP radiograph of the chest.    COMPARISON: Chest radiograph dated6/10/2022.    FINDINGS:    New right upper lobe opacity. The left lung is clear. No pleural effusion   or pneumothorax.  Cardiomediastinal silhouette is poorly evaluated on this projection.  Osseous degenerative changes.    IMPRESSION:    New right upper lobe opacity concerning for pneumonia.    < end of copied text >

## 2022-06-23 NOTE — CONSULT NOTE ADULT - SUBJECTIVE AND OBJECTIVE BOX
Reason for consult: Multiple Myeloma    HPI: 68 yo female with PMHx of multiple myeloma (not on chemo), HTN p/w fever.  Patient reports that she has had fever, cough and body aches since yesterday.  Tmax 102.  Patient states that she was seen at [facility] 2 weeks ago with similar symptoms.  Was diagnosed with pneumonia and discharged home on abx.  Patient initially felt better, but symptoms never completely resolved.   tested positive for covid yesterday.  Took home covid test that was negative miquel.  Denies CP, abd pain, NVD, dysuria    Hematology/Oncology called to see patient who is well known to Dr. Munir Sam of [facility] for the management of IgG Lambda multiple myeloma. Skeletal survey showed two small lucencies in the femoral neck but PET/CT was normal and patient has normal renal function and minimal anemia. Her only complaint was peripheral neuropathy in her feet. However, secondary to rising kappa lambda FLC ratio, M protein and other high risk features, treatment was recommended (Dr. Sam recommending daratumumab, bortezomib lenalidomide and dexamethazone) that was tentatively scheduled to start on 7/5/2022. Patient was planning to get a second opinion at [facility] before starting treatment. When the patient was seen by Dr. Sam on 6/17, she was experiencing fevers to 102 and was referred to the ED.        PAST MEDICAL & SURGICAL HISTORY:  HTN (hypertension)      HLD (hyperlipidemia)      Multiple myeloma      No significant past surgical history          FAMILY HISTORY:      Alcohol Denied  Smoking: Nonsmoker  Drug Use: Denied  Marital Status:         Allergies    No Known Allergies    Intolerances        MEDICATIONS  (STANDING):    MEDICATIONS  (PRN):      ROS  Fevers per HPI  No epistaxis, HA, sore throat  No CP, SOB, cough, sputum  No n/v/d, abd pain, melena, hematochezia  No edema  No rash  No anxiety  No back pain, joint pain  No bleeding, bruising  No dysuria, hematuria    T(C): 37.7 (06-23-22 @ 08:50), Max: 37.7 (06-23-22 @ 08:50)  HR: 98 (06-23-22 @ 08:50) (71 - 108)  BP: 144/70 (06-23-22 @ 08:50) (102/66 - 159/89)  RR: 18 (06-23-22 @ 08:50) (18 - 23)  SpO2: 93% (06-23-22 @ 08:50) (89% - 96%)  Wt(kg): --    PE  NAD  Awake, alert  Anicteric, MMM  RRR  CTAB  Abd soft, NT, ND  No c/c/e  No rash grossly                            10.6   8.53  )-----------( 296      ( 22 Jun 2022 21:33 )             32.6       06-22    133<L>  |  99  |  10  ----------------------------<  99  3.5   |  28  |  0.70    Ca    8.7      22 Jun 2022 21:33    TPro  9.6<H>  /  Alb  3.2<L>  /  TBili  0.5  /  DBili  x   /  AST  18  /  ALT  14  /  AlkPhos  73  06-22      ACC: [id number] EXAM:  XR CHEST AP OR PA 1V                          PROCEDURE DATE:  06/22/2022          INTERPRETATION:  CLINICAL INFORMATION: Fever. COVID-19 exposure.    TECHNIQUE: AP radiograph of the chest.    COMPARISON: Chest radiograph dated 6/10/2022.    FINDINGS:    New right upper lobe opacity. The left lung is clear. No pleural effusion   or pneumothorax.  Cardiomediastinal silhouette is poorly evaluated on this projection.  Osseous degenerative changes.    IMPRESSION:    New right upper lobe opacity concerning for pneumonia.    --- End of Report ---     Reason for consult: Multiple Myeloma    HPI: 66 yo female with PMHx of multiple myeloma (not on chemo), HTN p/w fever.  Patient reports that she has had fever, cough and body aches since yesterday.  Tmax 102.  Patient states that she was seen at [facility] 2 weeks ago with similar symptoms.  Was diagnosed with pneumonia and discharged home on abx.  Patient initially felt better, but symptoms never completely resolved.   tested positive for covid yesterday.  Took home covid test that was negative miquel.  Denies CP, abd pain, NVD, dysuria    Hematology/Oncology called to see patient who is well known to Dr. Munir Sam of [facility] for the management of IgG Lambda multiple myeloma. Skeletal survey showed two small lucencies in the femoral neck but PET/CT was normal and patient has normal renal function and minimal anemia. Her only complaint was peripheral neuropathy in her feet. However, secondary to rising kappa lambda FLC ratio, M protein and other high risk features, treatment was recommended (Dr. Sam recommending daratumumab, bortezomib lenalidomide and dexamethazone) that was tentatively scheduled to start on 7/5/2022. Patient was planning to get a second opinion at [facility] before starting treatment. When the patient was seen by Dr. Sam on 6/17, she was experiencing fevers to 102 and was referred to the ED.        PAST MEDICAL & SURGICAL HISTORY:  HTN (hypertension)      HLD (hyperlipidemia)      Multiple myeloma      No significant past surgical history          FAMILY HISTORY:      Alcohol Denied  Smoking: Nonsmoker  Drug Use: Denied  Marital Status:         Allergies    No Known Allergies    Intolerances        MEDICATIONS  (STANDING):    MEDICATIONS  (PRN):      ROS  Fevers per HPI  No epistaxis, HA, sore throat  Cough  No n/v/d, abd pain, melena, hematochezia  No edema  No rash  No anxiety  No back pain, joint pain  No bleeding, bruising  No dysuria, hematuria    T(C): 37.7 (06-23-22 @ 08:50), Max: 37.7 (06-23-22 @ 08:50)  HR: 98 (06-23-22 @ 08:50) (71 - 108)  BP: 144/70 (06-23-22 @ 08:50) (102/66 - 159/89)  RR: 18 (06-23-22 @ 08:50) (18 - 23)  SpO2: 93% (06-23-22 @ 08:50) (89% - 96%)  Wt(kg): --    PE  NAD  Awake, alert  Anicteric, MMM  RRR  CTAB  Abd soft, NT, ND  No c/c/e  No rash grossly                            10.6   8.53  )-----------( 296      ( 22 Jun 2022 21:33 )             32.6       06-22    133<L>  |  99  |  10  ----------------------------<  99  3.5   |  28  |  0.70    Ca    8.7      22 Jun 2022 21:33    TPro  9.6<H>  /  Alb  3.2<L>  /  TBili  0.5  /  DBili  x   /  AST  18  /  ALT  14  /  AlkPhos  73  06-22      ACC: [id number] EXAM:  XR CHEST AP OR PA 1V                          PROCEDURE DATE:  06/22/2022          INTERPRETATION:  CLINICAL INFORMATION: Fever. COVID-19 exposure.    TECHNIQUE: AP radiograph of the chest.    COMPARISON: Chest radiograph dated 6/10/2022.    FINDINGS:    New right upper lobe opacity. The left lung is clear. No pleural effusion   or pneumothorax.  Cardiomediastinal silhouette is poorly evaluated on this projection.  Osseous degenerative changes.    IMPRESSION:    New right upper lobe opacity concerning for pneumonia.    --- End of Report ---

## 2022-06-23 NOTE — PATIENT PROFILE ADULT - FALL HARM RISK - UNIVERSAL INTERVENTIONS
Bed in lowest position, wheels locked, appropriate side rails in place/Call bell, personal items and telephone in reach/Instruct patient to call for assistance before getting out of bed or chair/Non-slip footwear when patient is out of bed/[place] to call system/Physically safe environment - no spills, clutter or unnecessary equipment/Purposeful Proactive Rounding/Room/bathroom lighting operational, light cord in reach

## 2022-06-23 NOTE — H&P ADULT - ASSESSMENT
66 yo female with PMHx of recently diagnosed multiple myeloma (not on chemo), HTN p/w fever with T.max of 102 and non productive cough. CXR positive for new right upper lobe opacity concerning for pneumonia

## 2022-06-23 NOTE — CONSULT NOTE ADULT - NS ATTEND AMEND GEN_ALL_CORE FT
68 y/o female with multiple myeloma, recent marrow showing 35% plasma cell, follows with Dr. Renee Sam with plans to start chemotherapy soon admitted with continued cough and pneumonia. Family had tested positive for COVID-19.    - Planned for chemotherapy soon for multiple myeloma. No plans for treatment inpatient or in rehabilitation. Second opinion pending with [place] next Wednesday.  - On antibiotics at this time.  - Noted COVID exposure.

## 2022-06-24 ENCOUNTER — TRANSCRIPTION ENCOUNTER (OUTPATIENT)
Age: 67
End: 2022-06-24

## 2022-06-24 DIAGNOSIS — M79.605 PAIN IN LEFT LEG: ICD-10-CM

## 2022-06-24 LAB
ANION GAP SERPL CALC-SCNC: 9 MMOL/L — SIGNIFICANT CHANGE UP (ref 5–17)
BASOPHILS # BLD AUTO: 0.05 K/UL — SIGNIFICANT CHANGE UP (ref 0–0.2)
BASOPHILS NFR BLD AUTO: 0.6 % — SIGNIFICANT CHANGE UP (ref 0–2)
BUN SERPL-MCNC: 9 MG/DL — SIGNIFICANT CHANGE UP (ref 7–23)
CALCIUM SERPL-MCNC: 8.2 MG/DL — LOW (ref 8.4–10.5)
CHLORIDE SERPL-SCNC: 101 MMOL/L — SIGNIFICANT CHANGE UP (ref 96–108)
CO2 SERPL-SCNC: 26 MMOL/L — SIGNIFICANT CHANGE UP (ref 22–31)
CREAT SERPL-MCNC: 0.7 MG/DL — SIGNIFICANT CHANGE UP (ref 0.5–1.3)
CULTURE RESULTS: SIGNIFICANT CHANGE UP
EGFR: 95 ML/MIN/1.73M2 — SIGNIFICANT CHANGE UP
EOSINOPHIL # BLD AUTO: 0.03 K/UL — SIGNIFICANT CHANGE UP (ref 0–0.5)
EOSINOPHIL NFR BLD AUTO: 0.3 % — SIGNIFICANT CHANGE UP (ref 0–6)
GLUCOSE SERPL-MCNC: 111 MG/DL — HIGH (ref 70–99)
HCT VFR BLD CALC: 28.2 % — LOW (ref 34.5–45)
HCV AB S/CO SERPL IA: 0.12 S/CO — SIGNIFICANT CHANGE UP (ref 0–0.99)
HCV AB SERPL-IMP: SIGNIFICANT CHANGE UP
HGB BLD-MCNC: 9.1 G/DL — LOW (ref 11.5–15.5)
IMM GRANULOCYTES NFR BLD AUTO: 1.1 % — SIGNIFICANT CHANGE UP (ref 0–1.5)
LYMPHOCYTES # BLD AUTO: 3.88 K/UL — HIGH (ref 1–3.3)
LYMPHOCYTES # BLD AUTO: 43.4 % — SIGNIFICANT CHANGE UP (ref 13–44)
MAGNESIUM SERPL-MCNC: 2.1 MG/DL — SIGNIFICANT CHANGE UP (ref 1.6–2.6)
MCHC RBC-ENTMCNC: 28.9 PG — SIGNIFICANT CHANGE UP (ref 27–34)
MCHC RBC-ENTMCNC: 32.3 GM/DL — SIGNIFICANT CHANGE UP (ref 32–36)
MCV RBC AUTO: 89.5 FL — SIGNIFICANT CHANGE UP (ref 80–100)
MONOCYTES # BLD AUTO: 0.61 K/UL — SIGNIFICANT CHANGE UP (ref 0–0.9)
MONOCYTES NFR BLD AUTO: 6.8 % — SIGNIFICANT CHANGE UP (ref 2–14)
NEUTROPHILS # BLD AUTO: 4.27 K/UL — SIGNIFICANT CHANGE UP (ref 1.8–7.4)
NEUTROPHILS NFR BLD AUTO: 47.8 % — SIGNIFICANT CHANGE UP (ref 43–77)
NRBC # BLD: 0 /100 WBCS — SIGNIFICANT CHANGE UP (ref 0–0)
PHOSPHATE SERPL-MCNC: 3.1 MG/DL — SIGNIFICANT CHANGE UP (ref 2.5–4.5)
PLAT MORPH BLD: NORMAL — SIGNIFICANT CHANGE UP
PLATELET # BLD AUTO: 239 K/UL — SIGNIFICANT CHANGE UP (ref 150–400)
POTASSIUM SERPL-MCNC: 3.4 MMOL/L — LOW (ref 3.5–5.3)
POTASSIUM SERPL-SCNC: 3.4 MMOL/L — LOW (ref 3.5–5.3)
PROCALCITONIN SERPL-MCNC: 0.2 NG/ML — HIGH (ref 0.02–0.1)
RAPID RVP RESULT: DETECTED
RBC # BLD: 3.15 M/UL — LOW (ref 3.8–5.2)
RBC # FLD: 12.6 % — SIGNIFICANT CHANGE UP (ref 10.3–14.5)
RBC BLD AUTO: SIGNIFICANT CHANGE UP
SARS-COV-2 RNA SPEC QL NAA+PROBE: DETECTED
SODIUM SERPL-SCNC: 136 MMOL/L — SIGNIFICANT CHANGE UP (ref 135–145)
SPECIMEN SOURCE: SIGNIFICANT CHANGE UP
VARIANT LYMPHS # BLD: 10 % — HIGH (ref 0–6)
WBC # BLD: 8.94 K/UL — SIGNIFICANT CHANGE UP (ref 3.8–10.5)
WBC # FLD AUTO: 8.94 K/UL — SIGNIFICANT CHANGE UP (ref 3.8–10.5)

## 2022-06-24 PROCEDURE — 71250 CT THORAX DX C-: CPT | Mod: 26

## 2022-06-24 PROCEDURE — 99222 1ST HOSP IP/OBS MODERATE 55: CPT

## 2022-06-24 PROCEDURE — 99233 SBSQ HOSP IP/OBS HIGH 50: CPT | Mod: GC

## 2022-06-24 RX ORDER — SODIUM CHLORIDE 9 MG/ML
4 INJECTION INTRAMUSCULAR; INTRAVENOUS; SUBCUTANEOUS EVERY 12 HOURS
Refills: 0 | Status: DISCONTINUED | OUTPATIENT
Start: 2022-06-24 | End: 2022-07-01

## 2022-06-24 RX ORDER — POTASSIUM CHLORIDE 20 MEQ
40 PACKET (EA) ORAL ONCE
Refills: 0 | Status: COMPLETED | OUTPATIENT
Start: 2022-06-24 | End: 2022-06-24

## 2022-06-24 RX ORDER — CHLORHEXIDINE GLUCONATE 213 G/1000ML
1 SOLUTION TOPICAL
Refills: 0 | Status: DISCONTINUED | OUTPATIENT
Start: 2022-06-24 | End: 2022-07-01

## 2022-06-24 RX ADMIN — Medication 650 MILLIGRAM(S): at 20:33

## 2022-06-24 RX ADMIN — CEFEPIME 100 MILLIGRAM(S): 1 INJECTION, POWDER, FOR SOLUTION INTRAMUSCULAR; INTRAVENOUS at 12:14

## 2022-06-24 RX ADMIN — Medication 650 MILLIGRAM(S): at 07:16

## 2022-06-24 RX ADMIN — SODIUM CHLORIDE 4 MILLILITER(S): 9 INJECTION INTRAMUSCULAR; INTRAVENOUS; SUBCUTANEOUS at 20:30

## 2022-06-24 RX ADMIN — Medication 650 MILLIGRAM(S): at 19:33

## 2022-06-24 RX ADMIN — Medication 650 MILLIGRAM(S): at 05:53

## 2022-06-24 RX ADMIN — Medication 650 MILLIGRAM(S): at 13:23

## 2022-06-24 RX ADMIN — ESCITALOPRAM OXALATE 5 MILLIGRAM(S): 10 TABLET, FILM COATED ORAL at 12:13

## 2022-06-24 RX ADMIN — Medication 40 MILLIEQUIVALENT(S): at 09:46

## 2022-06-24 RX ADMIN — CEFEPIME 100 MILLIGRAM(S): 1 INJECTION, POWDER, FOR SOLUTION INTRAMUSCULAR; INTRAVENOUS at 23:58

## 2022-06-24 RX ADMIN — GABAPENTIN 300 MILLIGRAM(S): 400 CAPSULE ORAL at 05:50

## 2022-06-24 RX ADMIN — ENOXAPARIN SODIUM 40 MILLIGRAM(S): 100 INJECTION SUBCUTANEOUS at 17:07

## 2022-06-24 RX ADMIN — Medication 650 MILLIGRAM(S): at 13:53

## 2022-06-24 RX ADMIN — Medication 650 MILLIGRAM(S): at 19:01

## 2022-06-24 RX ADMIN — GABAPENTIN 300 MILLIGRAM(S): 400 CAPSULE ORAL at 17:07

## 2022-06-24 NOTE — DISCHARGE NOTE PROVIDER - NSDCCPCAREPLAN_GEN_ALL_CORE_FT
PRINCIPAL DISCHARGE DIAGNOSIS  Diagnosis: Pneumonia  Assessment and Plan of Treatment: You came in to the hospital with fever and worsening cough. We found that you had a new pneumonia on imaging. we started you on antibiotics. Your repeat covid test showed _____.   Please follow up with your primary care doctor in 1 week after discharge.      SECONDARY DISCHARGE DIAGNOSES  Diagnosis: Hypoxia  Assessment and Plan of Treatment:      PRINCIPAL DISCHARGE DIAGNOSIS  Diagnosis: Pneumonia  Assessment and Plan of Treatment: You came in to the hospital with fever and worsening cough. We found that you had a new pneumonia on imaging. we started you on antibiotics. Your repeat covid test showed that you were positive. We began a drug called remdesivir for your covid pneumonia and your symptoms improved. We were concerned that you may have tuberculosis however the test results that you did not have active tuberculosis infection    Please follow up with your primary care doctor in 1 week after discharge.      SECONDARY DISCHARGE DIAGNOSES  Diagnosis: Fever  Assessment and Plan of Treatment:     Diagnosis: Epistaxis  Assessment and Plan of Treatment:     Diagnosis: Hypoxia  Assessment and Plan of Treatment:      PRINCIPAL DISCHARGE DIAGNOSIS  Diagnosis: Pneumonia  Assessment and Plan of Treatment: You came in to the hospital with fever and worsening cough. We found that you had a new pneumonia on imaging. we started you on antibiotics. Your repeat covid test showed that you were positive. We began a drug called remdesivir for your covid pneumonia and your symptoms improved. We were concerned that you may have tuberculosis however the test results that you did not have active tuberculosis infection    Please follow up with your primary care doctor in 1 week after discharge.      SECONDARY DISCHARGE DIAGNOSES  Diagnosis: Fever  Assessment and Plan of Treatment: You have been having fevers for the past few weeks without clear reason. We believe this may have been due to covid pneumonia however we have tested you for tuberculosis and conducted an xray of your leg to determine if there is an infection. For the results please follow up with your infectious disease specialist.    Diagnosis: Epistaxis  Assessment and Plan of Treatment: You have been found to be having nose clots with sneezing. For this we had ENT come see you and they were able to cauterize an area of bleeding. Your clot formation continued and ENT came back and only noted an area of inflammation. For further management please follow up with your ENT specialist.    Diagnosis: Hypoxia  Assessment and Plan of Treatment:      PRINCIPAL DISCHARGE DIAGNOSIS  Diagnosis: Pneumonia  Assessment and Plan of Treatment: You came in to the hospital with fever and worsening cough. We found that you had a new pneumonia on imaging. We started you on antibiotics. Your repeat covid test showed that you were positive. We began a drug called remdesivir for your covid pneumonia and your symptoms improved. We were concerned that you may have tuberculosis however the test showed that you did not have active tuberculosis infection.   Please follow up with your primary care doctor and infectious disease doctor in 1 week after discharge.      SECONDARY DISCHARGE DIAGNOSES  Diagnosis: Fever  Assessment and Plan of Treatment: You have been having fevers for the past few weeks without clear reason. We believe this may have been due to covid pneumonia and your multiple myeloma. You may continue to have fevers due to your multiple myeloma and would have to follow up with infectious disease and your oncologist for further management.    Diagnosis: Epistaxis  Assessment and Plan of Treatment: You have been found to be having nose clots with sneezing. For this we had ENT come see you and they were able to cauterize an area of bleeding. Your clot formation continued and ENT came back and only noted an area of inflammation. For further management please follow up with your ENT specialist.    Diagnosis: HTN (hypertension)  Assessment and Plan of Treatment: You have a history of high blood pressure. Your amlodipine and valsartan were held while in the hospital because you had an infection and blood pressures were low. However, your blood pressures have started to go back up. Please restart your valsartan upon discharge. You can continue to hold your amlodipine unless your blood pressures at home have started to rise above normal of 120/80. Otherwise, please follow up with your PCP for further managhement of your blood pressure and associated medications.

## 2022-06-24 NOTE — PROGRESS NOTE ADULT - SUBJECTIVE AND OBJECTIVE BOX
PROGRESS NOTE:   Authored by Boyd Love MD  Pager: [facility] [phone]; [facility] [phone]    Patient is a 67y old  Female who presents with a chief complaint of fever and cough (2022 12:19)      SUBJECTIVE / OVERNIGHT EVENTS:  Patient denies any complaints overnight. The patient denies any fevers, chills, nausea, vomiting, or increased pain.     ADDITIONAL REVIEW OF SYSTEMS:    MEDICATIONS  (STANDING):  cefepime   IVPB      cefepime   IVPB 2000 milliGRAM(s) IV Intermittent every 12 hours  enoxaparin Injectable 40 milliGRAM(s) SubCutaneous every 24 hours  escitalopram 5 milliGRAM(s) Oral daily  gabapentin 300 milliGRAM(s) Oral two times a day  vancomycin  IVPB 500 milliGRAM(s) IV Intermittent every 12 hours    MEDICATIONS  (PRN):  acetaminophen     Tablet .. 650 milliGRAM(s) Oral every 6 hours PRN Temp greater or equal to 38C (100.4F), Mild Pain (1 - 3)      CAPILLARY BLOOD GLUCOSE        I&O's Summary    2022 07:01  -  2022 07:00  --------------------------------------------------------  IN: 240 mL / OUT: 0 mL / NET: 240 mL        PHYSICAL EXAM:  Vital Signs Last 24 Hrs  T(C): 37.8 (2022 05:55), Max: 38.3 (2022 19:58)  T(F): 100 (2022 05:55), Max: 100.9 (2022 19:58)  HR: 99 (2022 05:55) (92 - 111)  BP: 147/64 (2022 05:55) (131/65 - 154/73)  BP(mean): --  RR: 18 (2022 05:55) (18 - 20)  SpO2: 94% (2022 05:55) (93% - 97%)    GENERAL: No acute distress, well-developed  HEAD:  Atraumatic, Normocephalic  EYES: EOMI, PERRLA, conjunctiva and sclera clear  NECK: Supple, no lymphadenopathy, no JVD  CHEST/LUNG: CTAB; No wheezes, rales, or rhonchi  HEART: Regular rate and rhythm; No murmurs, rubs, or gallops  ABDOMEN: Soft, non-tender, non-distended; normal bowel sounds, no organomegaly  EXTREMITIES:  2+ peripheral pulses b/l, No clubbing, cyanosis, or edema  NEUROLOGY: A&O x 3, no focal deficits  SKIN: No rashes or lesions    LABS:                        10.6   8.53  )-----------( 296      ( 2022 21:33 )             32.6     06-    133<L>  |  99  |  10  ----------------------------<  99  3.5   |  28  |  0.70    Ca    8.7      2022 21:33    TPro  9.6<H>  /  Alb  3.2<L>  /  TBili  0.5  /  DBili  x   /  AST  18  /  ALT  14  /  AlkPhos  73  06-          Urinalysis Basic - ( 2022 21:32 )    Color: Light Yellow / Appearance: Clear / S.010 / pH: x  Gluc: x / Ketone: Negative  / Bili: Negative / Urobili: Negative   Blood: x / Protein: Trace / Nitrite: Negative   Leuk Esterase: Large / RBC: 1 /hpf / WBC 10 /HPF   Sq Epi: x / Non Sq Epi: 3 /hpf / Bacteria: Few        Culture - Blood (collected 2022 23:50)  Source: .Blood Blood-Venous  Preliminary Report (2022 03:01):    No growth to date.    Culture - Blood (collected 2022 23:40)  Source: .Blood Blood-Venous  Preliminary Report (2022 03:01):    No growth to date.        RADIOLOGY & ADDITIONAL TESTS:  Results Reviewed:   Imaging Personally Reviewed:  Electrocardiogram Personally Reviewed:    COORDINATION OF CARE:  Care Discussed with Consultants/Other Providers [Y/N]:  Prior or Outpatient Records Reviewed [Y/N]:   PROGRESS NOTE:   Authored by Boyd Love MD  Pager: [facility] [phone]; [facility] [phone]    Patient is a 67y old  Female who presents with a chief complaint of fever and cough (2022 12:19)      SUBJECTIVE / OVERNIGHT EVENTS:  Patient had a temp overnight and still having cough and some chills this morning. States that her leg pain is still there and she is having numbness both chronic     ADDITIONAL REVIEW OF SYSTEMS:    MEDICATIONS  (STANDING):  cefepime   IVPB      cefepime   IVPB 2000 milliGRAM(s) IV Intermittent every 12 hours  enoxaparin Injectable 40 milliGRAM(s) SubCutaneous every 24 hours  escitalopram 5 milliGRAM(s) Oral daily  gabapentin 300 milliGRAM(s) Oral two times a day  vancomycin  IVPB 500 milliGRAM(s) IV Intermittent every 12 hours    MEDICATIONS  (PRN):  acetaminophen     Tablet .. 650 milliGRAM(s) Oral every 6 hours PRN Temp greater or equal to 38C (100.4F), Mild Pain (1 - 3)      CAPILLARY BLOOD GLUCOSE        I&O's Summary    2022 07:01  -  2022 07:00  --------------------------------------------------------  IN: 240 mL / OUT: 0 mL / NET: 240 mL        PHYSICAL EXAM:  Vital Signs Last 24 Hrs  T(C): 37.8 (2022 05:55), Max: 38.3 (2022 19:58)  T(F): 100 (2022 05:55), Max: 100.9 (2022 19:58)  HR: 99 (2022 05:55) (92 - 111)  BP: 147/64 (2022 05:55) (131/65 - 154/73)  BP(mean): --  RR: 18 (2022 05:55) (18 - 20)  SpO2: 94% (2022 05:55) (93% - 97%)    GENERAL: No acute distress, well-developed  HEAD:  Atraumatic, Normocephalic  EYES: EOMI, PERRLA, conjunctiva and sclera clear  NECK: Supple, no lymphadenopathy, no JVD  CHEST/LUNG: CTAB; No wheezes, rales, or rhonchi  HEART: Regular rate and rhythm; No murmurs, rubs, or gallops  ABDOMEN: Soft, non-tender, non-distended; normal bowel sounds, no organomegaly  EXTREMITIES:  2+ peripheral pulses b/l, No clubbing, cyanosis, or edema  NEUROLOGY: A&O x 3, no focal deficits  SKIN: No rashes or lesions    LABS:                        10.6   8.53  )-----------( 296      ( 2022 21:33 )             32.6     06-22    133<L>  |  99  |  10  ----------------------------<  99  3.5   |  28  |  0.70    Ca    8.7      2022 21:33    TPro  9.6<H>  /  Alb  3.2<L>  /  TBili  0.5  /  DBili  x   /  AST  18  /  ALT  14  /  AlkPhos  73  06-22          Urinalysis Basic - ( 2022 21:32 )    Color: Light Yellow / Appearance: Clear / S.010 / pH: x  Gluc: x / Ketone: Negative  / Bili: Negative / Urobili: Negative   Blood: x / Protein: Trace / Nitrite: Negative   Leuk Esterase: Large / RBC: 1 /hpf / WBC 10 /HPF   Sq Epi: x / Non Sq Epi: 3 /hpf / Bacteria: Few        Culture - Blood (collected 2022 23:50)  Source: .Blood Blood-Venous  Preliminary Report (2022 03:01):    No growth to date.    Culture - Blood (collected 2022 23:40)  Source: .Blood Blood-Venous  Preliminary Report (2022 03:01):    No growth to date.        RADIOLOGY & ADDITIONAL TESTS:  Results Reviewed:   Imaging Personally Reviewed:  Electrocardiogram Personally Reviewed:    COORDINATION OF CARE:  Care Discussed with Consultants/Other Providers [Y/N]:  Prior or Outpatient Records Reviewed [Y/N]:

## 2022-06-24 NOTE — PHYSICAL THERAPY INITIAL EVALUATION ADULT - PRECAUTIONS/LIMITATIONS, REHAB EVAL
In the ED, pt had a Tmax of 100.8, HR of 110, BP of 154/73 and saturating well on RA. In the ED, pt given 500cc bolus and Vanc + cefepime. Pt admitted for sepsis. CXR shows new right upper lobe opacity concerning for pna. UA +.

## 2022-06-24 NOTE — DISCHARGE NOTE PROVIDER - PROVIDER TOKENS
PROVIDER:[TOKEN:[[id number]],FOLLOWUP:[2 weeks],ESTABLISHEDPATIENT:[T]],PROVIDER:[TOKEN:[[id number]],FOLLOWUP:[2 weeks],ESTABLISHEDPATIENT:[T]] PROVIDER:[TOKEN:[[id number]],FOLLOWUP:[2 weeks],ESTABLISHEDPATIENT:[T]],PROVIDER:[TOKEN:[[id number]],FOLLOWUP:[2 weeks],ESTABLISHEDPATIENT:[T]],PROVIDER:[TOKEN:[[id number]],FOLLOWUP:[1 week],ESTABLISHEDPATIENT:[T]]

## 2022-06-24 NOTE — PHYSICAL THERAPY INITIAL EVALUATION ADULT - GAIT PATTERN USED, PT EVAL
steady with RW; Antalgic gait w/o device and reaching for furniture. SOB limiting amb; pt desatted to 90%/3-point gait

## 2022-06-24 NOTE — CONSULT NOTE ADULT - SUBJECTIVE AND OBJECTIVE BOX
Patient is a 67y old  Female who presents with a chief complaint of fever and cough (2022 09:13)      HPI:  68 yo female with PMHx of recently diagnosed multiple myeloma (not on chemo), HTN p/w fever and cough. She was seen at [facility] for similar symptoms 2 weeks ago and was discharged with cefpodoxime and azithromycin. Per pt, her symptoms did not resolve. She started having a non-productive cough yesterday and had a temp of 102. Pt  and mother tested positive for COVID 3 days ago. She took a home test today which was negative. She also endorses mild dysuria intermittently. Pt states that she has having low grade fever (99 to 100.1) for the past 2 months with left lower leg pain/numbness. She denies any chest pain, SOB, N/V, abdominal pain or dizziness.     In the ED, pt had a Tmax of 100.8, HR of 110, BP of 154/73 and saturating well on RA. In the ED, pt given 500cc bolus and Vanc + cefepime (2022 12:19)      PAST MEDICAL & SURGICAL HISTORY:  HTN (hypertension)      HLD (hyperlipidemia)      Multiple myeloma      No significant past surgical history          Social history:   Social History:    Marital Status:   Occupation:   Lives with:     Substance Use :  Tobacco Usage:  (   ) never smoked   (   ) former smoker   (   ) current smoker  (     ) pack year  (        ) last tobacco use date  Alcohol Usage:  Travel:  Pets:          FAMILY HISTORY:      REVIEW OF SYSTEMS  General:	Denies any malaise fatigue or chills. Fevers absent    Skin:No rash  	  Ophthalmologic:Denies any visual complaints,discharge redness or photophobia  	  ENMT:No nasal discharge,headache,sinus congestion or throat pain.No dental complaints    Respiratory and Thorax:No cough,sputum or chest pain.Denies shortness of breath  	  Cardiovascular:	No chest pain,palpitaions or dizziness    Gastrointestinal:	NO nausea,abdominal pain or diarrhea.    Genitourinary:	No dysuria,frequency. No flank pain    Musculoskeletal:	No joint swelling or pain.No weakness    Neurological:No confusion,diziness.No extremity weakness.No bladder or bowel incontinence	    Psychiatric:No delusions or hallucinations	    Hematology/Lymphatics:	No LN swelling.No gum bleeding     Endocrine:	No recent weight gain or loss.No abnormal heat/cold intolerance    Allergic/Immunologic:	No hives or rash     Allergies    No Known Allergies    Intolerances        Antimicrobials:       MEDICATIONS  (prior antimicrobials ):  cefepime   IVPB   100 mL/Hr IV Intermittent (22 @ 23:45)    cefepime   IVPB   100 mL/Hr IV Intermittent (22 @ 12:31)    cefepime   IVPB   100 mL/Hr IV Intermittent (22 @ 12:14)   100 mL/Hr IV Intermittent (22 @ 23:11)    vancomycin  IVPB   250 mL/Hr IV Intermittent (22 @ 01:05)    vancomycin  IVPB   100 mL/Hr IV Intermittent (22 @ 23:10)   100 mL/Hr IV Intermittent (22 @ 13:15)             cefepime   IVPB      cefepime   IVPB 2000 milliGRAM(s) IV Intermittent every 12 hours      MEDICATIONS  (STANDING):  cefepime   IVPB      cefepime   IVPB 2000 milliGRAM(s) IV Intermittent every 12 hours  enoxaparin Injectable 40 milliGRAM(s) SubCutaneous every 24 hours  escitalopram 5 milliGRAM(s) Oral daily  gabapentin 300 milliGRAM(s) Oral two times a day    MEDICATIONS  (PRN):  acetaminophen     Tablet .. 650 milliGRAM(s) Oral every 6 hours PRN Temp greater or equal to 38C (100.4F), Mild Pain (1 - 3)        Vital Signs Last 24 Hrs  T(C): 39.6 (2022 14:23), Max: 39.6 (2022 14:23)  T(F): 103.3 (2022 14:23), Max: 103.3 (2022 14:23)  HR: 92 (2022 11:39) (80 - 111)  BP: 131/68 (2022 11:39) (130/62 - 147/64)  BP(mean): --  RR: 18 (2022 08:56) (18 - 20)  SpO2: 90% (2022 11:40) (90% - 97%)    PHYSICAL EXAM:Pleasant patient in no acute distress.      Constitutional:Comfortable.Awake and alert  No cachexia     Eyes:PERRL EOMI.NO discharge or conjunctival injection    ENMT:No sinus tenderness.No thrush.No pharyngeal exudate or erythema.Fair dental hygiene    Neck:Supple,No LN,no JVD      Respiratory:Good air entry bilaterally,CTA    Cardiovascular:S1 S2 wnl, No murmurs,rub or gallops    Gastrointestinal:Soft BS(+) no tenderness no masses ,No rebound or guarding    Genitourinary:No CVA tendereness     Rectal:    Extremities:No cyanosis,clubbing or edema.    Vascular:peripheral pulses felt    Neurological:AAO X 3,No grossly focal deficits    Skin:No rash     Lymph Nodes:No palpable LNs    Musculoskeletal:No joint swelling or LOM    Psychiatric:Affect normal.                                9.1    8.94  )-----------( 239      ( 2022 07:14 )             28.2     LIVER FUNCTIONS - ( 2022 21:33 )  Alb: 3.2 g/dL / Pro: 9.6 g/dL / ALK PHOS: 73 U/L / ALT: 14 U/L / AST: 18 U/L / GGT: x             -    136  |  101  |  9   ----------------------------<  111<H>  3.4<L>   |  26  |  0.70    Ca    8.2<L>      2022 07:11  Phos  3.1     06-24  Mg     2.1     06-24    TPro  9.6<H>  /  Alb  3.2<L>  /  TBili  0.5  /  DBili  x   /  AST  18  /  ALT  14  /  AlkPhos  73  -          Urinalysis Basic - ( 2022 21:32 )    Color: Light Yellow / Appearance: Clear / S.010 / pH: x  Gluc: x / Ketone: Negative  / Bili: Negative / Urobili: Negative   Blood: x / Protein: Trace / Nitrite: Negative   Leuk Esterase: Large / RBC: 1 /hpf / WBC 10 /HPF   Sq Epi: x / Non Sq Epi: 3 /hpf / Bacteria: Few        RECENT CULTURES:    --  --    No growth to date.  --                    MICROBIOLOGY:          Radiology:    < from: Xray Chest 1 View AP/PA (22 @ 21:17) >    ACC: [id number] EXAM:  XR CHEST AP OR PA 1V                          PROCEDURE DATE:  2022          INTERPRETATION:  CLINICAL INFORMATION: Fever. COVID-19 exposure.    TECHNIQUE: AP radiograph of the chest.    COMPARISON: Chest radiograph dated6/10/2022.    FINDINGS:    New right upper lobe opacity. The left lung is clear. No pleural effusion   or pneumothorax.  Cardiomediastinal silhouette is poorly evaluated on this projection.  Osseous degenerative changes.    IMPRESSION:    New right upper lobe opacity concerning for pneumonia.    --- End of Report ---          PRETTY GUPTA MD; Resident Radiologist  This document has been electronically signed.  HEIDY RIZO MD; Attending Radiologist  This document has been electronically signed. 2022 10:56PM    < end of copied text >       Patient is a 67y old  Female who presents with a chief complaint of fever and cough (2022 09:13)      HPI:  68 yo female with PMHx of recently diagnosed multiple myeloma (not on chemo), HTN p/w fever and cough. She was seen at [facility] for similar symptoms 2 weeks ago and was discharged with cefpodoxime and azithromycin. According to arturo she presented with low grade fevers and leg sxs. She was diagnosed with multiple myeloma and the soctor said since you have this diagnosis and fever , you should go to the ER. The ER gave her oral abs and d/john her Per pt, her symptoms did not resolve. She started having a non-productive cough yesterday and had a temp of 102. Pt  and mother tested positive for COVID 3 days ago. She took a home test today which was negative. She also endorses mild dysuria intermittently. Pt states that she has having low grade fever (99 to 100.1) for the past 2 months with left lower leg pain/numbness. She denies any chest pain, SOB, N/V, abdominal pain or dizziness.   pt has been coughing up blood for the past month. Pt believes it is coming from her nose. She can also blow her nose with clots. She went to an ENt who did not find anything   Pt's daughter had MTb when she was [age] and was treated for a year. The patient took care of her at the time  In the ED, pt had a Tmax of 100.8, HR of 110, BP of 154/73 and saturating well on RA. In the ED, pt given 500cc bolus and Vanc + cefepime (2022 12:19)  Pt was started on abs but continues to spike .Covid swab was negative  CXR with RUL infiltrate      PAST MEDICAL & SURGICAL HISTORY:  HTN (hypertension)      HLD (hyperlipidemia)      Multiple myeloma      No significant past surgical history          Social history:   Marital Status:   Occupation: retired   Lives with: family    Substance Use : denies  Tobacco Usage:  (  x ) never smoked   (   ) former smoker   (   ) current smoker  (     ) pack year  (        ) last tobacco use date  Alcohol Usage: denies  [facility]  P[facility]: denies          FAMILY HISTORY:  daughther with h/o MTb  father CHF  mother with Covid    REVIEW OF SYSTEMS  General:	fevers    Skin:No rash  	  Ophthalmologic:Denies any visual complaints,discharge redness or photophobia  	  ENMT:No nasal discharge,headache,sinus congestion or throat pain.No dental complaints    Respiratory and Thorax: cough  coughs up blood  Right upper chest pain  	  Cardiovascular:	No chest pain,palpitaions or dizziness    Gastrointestinal:	NO nausea,abdominal pain or diarrhea.    Genitourinary:	No dysuria,frequency. No flank pain    Musculoskeletal:	No joint swelling or pain.No weakness    Neurological: left leg numbness	    Psychiatric:No delusions or hallucinations	    Hematology/Lymphatics:	No LN swelling.No gum bleeding     Endocrine:	No recent weight gain or loss.No abnormal heat/cold intolerance    Allergic/Immunologic:	No hives or rash     Allergies    No Known Allergies    Intolerances        Antimicrobials:       MEDICATIONS  (prior antimicrobials ):  cefepime   IVPB   100 mL/Hr IV Intermittent (22 @ 23:45)    cefepime   IVPB   100 mL/Hr IV Intermittent (22 @ 12:31)    cefepime   IVPB   100 mL/Hr IV Intermittent (22 @ 12:14)   100 mL/Hr IV Intermittent (22 @ 23:11)    vancomycin  IVPB   250 mL/Hr IV Intermittent (22 @ 01:05)    vancomycin  IVPB   100 mL/Hr IV Intermittent (22 @ 23:10)   100 mL/Hr IV Intermittent (22 @ 13:15)             cefepime   IVPB      cefepime   IVPB 2000 milliGRAM(s) IV Intermittent every 12 hours      MEDICATIONS  (STANDING):  cefepime   IVPB      cefepime   IVPB 2000 milliGRAM(s) IV Intermittent every 12 hours  enoxaparin Injectable 40 milliGRAM(s) SubCutaneous every 24 hours  escitalopram 5 milliGRAM(s) Oral daily  gabapentin 300 milliGRAM(s) Oral two times a day    MEDICATIONS  (PRN):  acetaminophen     Tablet .. 650 milliGRAM(s) Oral every 6 hours PRN Temp greater or equal to 38C (100.4F), Mild Pain (1 - 3)        Vital Signs Last 24 Hrs  T(C): 39.6 (2022 14:23), Max: 39.6 (2022 14:23)  T(F): 103.3 (2022 14:23), Max: 103.3 (2022 14:23)  HR: 92 (2022 11:39) (80 - 111)  BP: 131/68 (2022 11:39) (130/62 - 147/64)  BP(mean): --  RR: 18 (2022 08:56) (18 - 20)  SpO2: 90% (2022 11:40) (90% - 97%)    PHYSICAL EXAM:Pleasant patient in no acute distress.      Constitutional:Comfortable.Awake and alert  No cachexia     Eyes:PERRL EOMI.NO discharge or conjunctival injection    ENMT:No sinus tenderness.No thrush.No pharyngeal exudate or erythema.Fair dental hygiene    Neck:Supple,No LN,no JVD      Respiratory:Good air entry bilaterally,CTA    Cardiovascular:S1 S2 wnl,    Gastrointestinal:Soft BS(+) no tenderness no masses     Extremities:No cyanosis,clubbing or edema.    Vascular:peripheral pulses felt    Neurological:AAO X 3,No grossly focal deficits    Skin:No rash     Lymph Nodes:No palpable LNs    Musculoskeletal:No joint swelling or LOM    Psychiatric:Affect normal.                                9.1    8.94  )-----------( 239      ( 2022 07:14 )             28.2     LIVER FUNCTIONS - ( 2022 21:33 )  Alb: 3.2 g/dL / Pro: 9.6 g/dL / ALK PHOS: 73 U/L / ALT: 14 U/L / AST: 18 U/L / GGT: x                 136  |  101  |  9   ----------------------------<  111<H>  3.4<L>   |  26  |  0.70    Ca    8.2<L>      2022 07:11  Phos  3.1     -  Mg     2.1     -    TPro  9.6<H>  /  Alb  3.2<L>  /  TBili  0.5  /  DBili  x   /  AST  18  /  ALT  14  /  AlkPhos  73        Procalcitonin, Serum (22 @ 07:11)    Procalcitonin, Serum: 0.20: This assay is intended for use to determine the change of PCT over time  as an aid in assessing the cumulative 28-day risk of all-cause mortality  for patients diagnosed with severe sepsis or septic shock in the ICU, or  when obtained in the emergency department or other medical wards prior to  ICU admission. This assay was performed by the Roche Elecs[employer] BRAHMS PCT  assay. ng/mL        Urinalysis Basic - ( 2022 21:32 )    Color: Light Yellow / Appearance: Clear / S.010 / pH: x  Gluc: x / Ketone: Negative  / Bili: Negative / Urobili: Negative   Blood: x / Protein: Trace / Nitrite: Negative   Leuk Esterase: Large / RBC: 1 /hpf / WBC 10 /HPF   Sq Epi: x / Non Sq Epi: 3 /hpf / Bacteria: Few      Culture - Urine (22 @ 12:44)    Specimen Source: Clean Catch Clean Catch (Midstream)    Culture Results:   <10,000 CFU/mL Normal Urogenital Sahra      MRSA/MSSA PCR (22 @ 23:51)    MRSA PCR Result.: NotDete: MRSA/MSSA PCR assay is a qualitative in vitro diagnostic test for the  direct detection and differentiation of methicillin-resistant  Staphylococcus aureus (MRSA) from Staphylococcus aureus (SA). The assay  detects DNA from nasal swabs in patients atrisk for nasal colonization.  It is not intended to diagnose MRSA or SA infections nor guide or monitor  treatment for MRSA/SA infections. A negative result does not preclude  nasal colonization. The Real-Time PCR assay is FDA-approved, and its  performance has been established by [facility] [place], [place].    Staph aureus PCR Result: [facility]        Culture - Blood (22 @ 23:50)    Specimen Source: .Blood Blood-Venous    Culture Results:   No growth to date.    Culture - Blood (22 @ 23:40)    Specimen Source: .Blood Blood-Venous    Culture Results:   No growth to date.    Respiratory Viral Panel with COVID-19 by TONE (22 @ 21:33)    Rapid RVP Result: [facility]    SARS-CoV-2: [facility]: This Respiratory Panel uses polymerase chain reaction (PCR) to detect for  adenovirus; coronavirus (HKU1, NL63, 229E, OC43); human metapneumovirus  (hMPV); human enterovirus/rhinovirus (Entero/RV); influenza A; influenza  A/H1; influenza A/H3; influenza A/H1-2009; influenza B; parainfluenza  viruses 1, 2, 3, 4; respiratory syncytial virus; Mycoplasma pneumoniae;  Chlamydophila pneumoniae; and SARS-CoV-2.        MICROBIOLOGY:          Radiology:    < from: Xray Chest 1 View AP/PA (22 @ 21:17) >    ACC: [id number] EXAM:  XR CHEST AP OR PA 1V                          PROCEDURE DATE:  2022          INTERPRETATION:  CLINICAL INFORMATION: Fever. COVID-19 exposure.    TECHNIQUE: AP radiograph of the chest.    COMPARISON: Chest radiograph dated6/10/2022.    FINDINGS:    New right upper lobe opacity. The left lung is clear. No pleural effusion   or pneumothorax.  Cardiomediastinal silhouette is poorly evaluated on this projection.  Osseous degenerative changes.    IMPRESSION:    New right upper lobe opacity concerning for pneumonia.    --- End of Report ---          PRETTY GUPTA MD; Resident Radiologist  This document has been electronically signed.  HEIDY RIZO MD; Attending Radiologist  This document has been electronically signed. 2022 10:56PM    < end of copied text >

## 2022-06-24 NOTE — CONSULT NOTE ADULT - ASSESSMENT
66 yo female with PMHx of recently diagnosed multiple myeloma (not on chemo), HTN p/w fever and cough. She was seen at [facility] for similar symptoms 2 weeks ago and was discharged with cefpodoxime and azithromycin. According to arturo she presented with low grade fevers and leg sxs. She was diagnosed with multiple myeloma and the soctor said since you have this diagnosis and fever , you should go to the ER. The ER gave her oral abs and d/john her Per pt, her symptoms did not resolve. She started having a non-productive cough yesterday and had a temp of 102. Pt  and mother tested positive for COVID 3 days ago. She took a home test today which was negative. She also endorses mild dysuria intermittently. Pt states that she has having low grade fever (99 to 100.1) for the past 2 months with left lower leg pain/numbness. She denies any chest pain, SOB, N/V, abdominal pain or dizziness.   pt has been coughing up blood for the past month. Pt believes it is coming from her nose. She can also blow her nose with clots. She went to an ENt who did not find anything   Pt's daughter had MTb when she was [age] and was treated for a year. The patient took care of her at the time  In the ED, pt had a Tmax of 100.8, HR of 110, BP of 154/73 and saturating well on RA. In the ED, pt given 500cc bolus and Vanc + cefepime (23 Jun 2022 12:19)  Pt was started on abs but continues to spike .Covid swab was negative  CXR with RUL infiltrate    A/P    #FEVER  I AM  particularly concerned about two things- one is Covid and the other is possible MTB ofcourse the ddx is broad and she can have both of these things at once  suggest   repeat covid swab/RVP  check CT of chest   sputum for AFB and routine  culture  urine histo  ag  airborne isolation  check QuantiFeron Tb  procalcitonin is low for a routine bacterial pneumonia    #LLE NUMBNESS  Hematology/Oncology called to see patient who is well known to Dr. Munir Sam of [facility] for the management of IgG Lambda multiple myeloma. Skeletal survey showed two small lucencies in the femoral neck but PET/CT was normal and patient has normal renal function and minimal anemia. Her only complaint was peripheral neuropathy in her feet. However, secondary to rising kappa lambda FLC ratio, M protein and other high risk features, treatment was recommended (Dr. Sam recommending daratumumab, bortezomib lenalidomide and dexamethazone) that was tentatively scheduled to start on 7/5/2022. Patient was planning to get a second opinion at [facility] before starting treatment. When the patient was seen by Dr. Sam on 6/17, she was experiencing fevers to 102 and was referred to the ED.  ? was back imaged as well??    #COVID 19  Repeat swab  check d-dimer, ferritin, and CRP      Dixie Mercedes M.D. ,   please reach via teams   If no answer, or after 5PM/ weekends,  then please call  [phone]    Assessment and plan discussed with the primary team .    ID service will be covering over the weekend. Please call for acute issues or questions. [phone]

## 2022-06-24 NOTE — PHYSICAL THERAPY INITIAL EVALUATION ADULT - PERTINENT HX OF CURRENT PROBLEM, REHAB EVAL
66 yo F with PMHx of recently dx'd multiple myeloma (not on chemo), HTN p/w fever and non-productive cough and dysuria. Pt was seen at [facility] for similar symptoms 2 wks ago and d/c'd with cefpodoxime and azithromycin, but symptoms did not resolve.  and mother + COVID 3 days ago; pt took a home test in am of admission day which was negative. Pt reports low grade fever (99 to 100.1) x 2 months with LLE pain/numbness.

## 2022-06-24 NOTE — PROGRESS NOTE ADULT - PROBLEM SELECTOR PLAN 4
DVT ppx: Lovenox -Being followed by Dr. Munir Sam of [facility]  -Treatment was tentatively scheduled to start 7/5, Pending second opinion at [facility] on 6/29/2022  -Heme onc following

## 2022-06-24 NOTE — PROGRESS NOTE ADULT - PROBLEM SELECTOR PLAN 3
-Being followed by Dr. Munir Sam of [facility]  -Treatment was tentatively scheduled to start 7/5, Pending second opinion at [facility] on 6/29/2022  -Heme onc following -Pt on losartan and amlodipine at home  -Will hold in the setting of sepsis

## 2022-06-24 NOTE — DISCHARGE NOTE PROVIDER - HOSPITAL COURSE
68 yo female with PMHx of recently diagnosed multiple myeloma (not on chemo), HTN p/w fever and cough. She was seen at [facility] for similar symptoms 2 weeks ago and was discharged with cefpodoxime and azithromycin. Per pt, her symptoms did not resolve. She started having a non-productive cough yesterday and had a temp of 102. Pt  and mother tested positive for COVID 3 days ago. She took a home test today which was negative. She also endorses mild dysuria intermittently. Pt states that she has having low grade fever (99 to 100.1) for the past 2 months with left lower leg pain/numbness. She denies any chest pain, SOB, N/V, abdominal pain or dizziness.     In the ED, pt had a Tmax of 100.8, HR of 110, BP of 154/73 and saturating well on RA. In the ED, pt given 500cc bolus and Vanc + cefepime. BCx was NGTD and sputum cx ____. Pt started on Vanc and cefepime 68 yo female with PMHx of recently diagnosed multiple myeloma (not on chemo), HTN p/w fever and cough. She was seen at [facility] for similar symptoms 2 weeks ago and was discharged with cefpodoxime and azithromycin. Per pt, her symptoms did not resolve. She started having a non-productive cough yesterday and had a temp of 102. Pt  and mother tested positive for COVID 3 days ago. She took a home test today which was negative. She also endorses mild dysuria intermittently. Pt states that she has having low grade fever (99 to 100.1) for the past 2 months with left lower leg pain/numbness. She denies any chest pain, SOB, N/V, abdominal pain or dizziness.     In the ED, pt had a Tmax of 100.8, HR of 110, BP of 154/73 and saturating well on RA. In the ED, pt given 500cc bolus and Vanc + cefepime. BCx was NGTD and sputum cx ____. Pt started on Vanc and cefepime. She continued to have fevers through hospitalization until 6/29 when no longer requiring tylenol.      6/23: vanc/cefepime  6/24: ID consulted since pt still febrile on abx. concern for TB   6/25: start remdesivir given covid positive. no need for full dose lovenox. Not candidate for monoclonal abs under EUA as presented with covid.  6/26 hycodan for severe cough. DC cefepime, obtain afb x3.  6/27- status quo  6/28: consulted ENT for blood clots when sneezing now cauterized with NS nasal sprays and bacitracin nasal ointment  6/29 finished remdesivir, negative quantTB, AFB, and urine histo, febrile overnight 100.7   66 yo female with PMHx of recently diagnosed multiple myeloma (not on chemo), HTN p/w fever and cough. She was seen at [facility] for similar symptoms 2 weeks ago and was discharged with cefpodoxime and azithromycin. Per pt, her symptoms did not resolve. She started having a non-productive cough yesterday and had a temp of 102. Pt  and mother tested positive for COVID 3 days ago. She took a home test today which was negative. She also endorses mild dysuria intermittently. Pt states that she has having low grade fever (99 to 100.1) for the past 2 months with left lower leg pain/numbness. She denies any chest pain, SOB, N/V, abdominal pain or dizziness.     In the ED, pt had a Tmax of 100.8, HR of 110, BP of 154/73 and saturating well on RA. In the ED, pt given 500cc bolus and Vanc + cefepime. BCx was NGTD. Pt started on Vanc and cefepime. She continued to have fevers through hospitalization until 6/29 when no longer requiring tylenol. She was evaluated for tuberculosis with negative sputum cultures, negative Urine histoplasma antigen, and negative quantiferon gold. She completed 5 days of remdesivir 6/25-6/29 with improvement of patient symptoms. She was experiencing severe cough and started on hycodan for cough. Her fevers resolved with last known fever of 6/29. She was seen by infectious disease which recommended a Xray lumbar spine and CMV, EBV PCRs.    She was also complaining of blood clots in her nose and was evaluated by ENT and received cauterization for an excoriation in the right septum and daily nasal saline spray. She continued to experience those symptoms after cauterization when re-evaluated by ENT she ___. 68 yo female with PMHx of recently diagnosed multiple myeloma (not on chemo), HTN p/w fever and cough. She was seen at [facility] for similar symptoms 2 weeks ago and was discharged with cefpodoxime and azithromycin. Per pt, her symptoms did not resolve. She started having a non-productive cough yesterday and had a temp of 102. Pt  and mother tested positive for COVID 3 days ago. She took a home test today which was negative. She also endorses mild dysuria intermittently. Pt states that she has having low grade fever (99 to 100.1) for the past 2 months with left lower leg pain/numbness. She denies any chest pain, SOB, N/V, abdominal pain or dizziness.     In the ED, pt had a Tmax of 100.8, HR of 110, BP of 154/73 and saturating well on RA. In the ED, pt given 500cc bolus and Vanc + cefepime. BCx was NGTD. Pt started on Vanc and cefepime. She continued to have fevers through hospitalization until 6/29 when no longer requiring tylenol. She was evaluated for tuberculosis with negative sputum cultures, negative Urine histoplasma antigen, and negative quantiferon gold. She completed 5 days of remdesivir 6/25-6/29 with improvement of patient symptoms. She was experiencing severe cough and started on hycodan for cough. Her fevers resolved with last known fever of 6/29. She was seen by infectious disease which recommended a Xray lumbar spine and CMV, EBV PCRs to be followed up outpatient.    She was also complaining of blood clots in her nose and was evaluated by ENT and received cauterization for an excoriation in the right septum and daily nasal saline spray. She continued to experience those symptoms after cauterization when re-evaluated by ENT she had erythema of the nose w/o other intervention necessary. 66 yo female with PMHx of recently diagnosed multiple myeloma (not on chemo), HTN p/w fever and cough. She was seen at [facility] for similar symptoms 2 weeks ago and was discharged with cefpodoxime and azithromycin. Per pt, her symptoms did not resolve. She started having a non-productive cough yesterday and had a temp of 102. Pt  and mother tested positive for COVID 3 days ago. She took a home test today which was negative. She also endorses mild dysuria intermittently. Pt states that she has having low grade fever (99 to 100.1) for the past 2 months with left lower leg pain/numbness. She denies any chest pain, SOB, N/V, abdominal pain or dizziness.     In the ED, pt had a Tmax of 100.8, HR of 110, BP of 154/73 and saturating well on RA. In the ED, pt given 500cc bolus and Vanc + cefepime. BCx was NGTD. Pt started on Vanc and cefepime. She continued to have fevers through hospitalization until 6/29 when no longer requiring tylenol. She was evaluated for tuberculosis with negative sputum cultures, negative Urine histoplasma antigen, and negative quantiferon gold. She completed 5 days of remdesivir 6/25-6/29 with improvement of patient symptoms. She was experiencing severe cough and started on hycodan for cough. Her fevers resolved with last known fever of 6/29. She was seen by infectious disease which recommended a Xray lumbar spine and CMV, EBV PCRs to be followed up outpatient. Xray spine preliminary read showed ___________    She was also complaining of blood clots in her nose and was evaluated by ENT and received cauterization for an excoriation in the right septum and daily nasal saline spray. She continued to experience those symptoms after cauterization when re-evaluated by ENT she had erythema of the nose w/o other intervention necessary. 68 yo female with PMHx of recently diagnosed multiple myeloma (not on chemo), HTN p/w fever and cough. She was seen at [facility] for similar symptoms 2 weeks ago and was discharged with cefpodoxime and azithromycin. Per pt, her symptoms did not resolve. She started having a non-productive cough yesterday and had a temp of 102. Pt  and mother tested positive for COVID 3 days ago. She took a home test today which was negative. She also endorses mild dysuria intermittently. Pt states that she has having low grade fever (99 to 100.1) for the past 2 months with left lower leg pain/numbness. She denies any chest pain, SOB, N/V, abdominal pain or dizziness.     In the ED, pt had a Tmax of 100.8, HR of 110, BP of 154/73 and saturating well on RA. In the ED, pt given 500cc bolus and Vanc + cefepime. BCx was NGTD. Pt started on Vanc and cefepime. She continued to have fevers through hospitalization until 6/29 when no longer requiring tylenol. She was evaluated for tuberculosis with negative sputum cultures, negative Urine histoplasma antigen, and negative quantiferon gold. She completed 5 days of remdesivir 6/25-6/29 with improvement of patient symptoms. She was experiencing severe cough and started on hycodan for cough. Her fevers resolved with last known fever of 6/29. She was seen by infectious disease which recommended a Xray lumbar spine and CMV, EBV PCRs to be followed up outpatient. Xray spine preliminary read showed No compression fractures, spondylolistheses, or spondylolysis defects. Significantly narrowed L5-S1 disc space. Preserved remaining intervertebral disc spaces. Unremarkable SI joints and partially visualized hips. Small vague round sclerotic density focus projecting in L4 vertebral body on lateral view may represent a bone island versus other superimposed   nodular shadow. No additional focal osseous lesions.    She was also complaining of blood clots in her nose and was evaluated by ENT and received cauterization for an excoriation in the right septum and daily nasal saline spray. She continued to experience those symptoms after cauterization when re-evaluated by ENT she had erythema of the nose w/o other intervention necessary.      BP meds held during hospitalization--> will restart valsartan but need to follow up with PCP to restart amlodipine     Patient medically optimized for discharge

## 2022-06-24 NOTE — PROGRESS NOTE ADULT - PROBLEM SELECTOR PLAN 2
-Pt on losartan and amlodipine at home  -Will hold in the setting of sepsis -Pt having chronic left leg pain and numbness  -Outpatient x-ray negative for fracture dislocation  -Will cont gabapentin 300mg BID  -Will get PT eval

## 2022-06-24 NOTE — DISCHARGE NOTE PROVIDER - NSDCMRMEDTOKEN_GEN_ALL_CORE_FT
amLODIPine 5 mg oral tablet: 1 tab(s) orally once a day  gabapentin 300 mg oral tablet: 1 tab(s) orally 2 times a day  Lexapro 5 mg oral tablet: 1 tab(s) orally once a day  valsartan 160 mg oral tablet: 1 tab(s) orally once a day   acetaminophen 325 mg oral tablet: 2 tab(s) orally every 6 hours, As needed, Temp greater or equal to 38C (100.4F), Mild Pain (1 - 3)  gabapentin 100 mg oral capsule: 1 cap(s) orally 2 times a day  Lexapro 5 mg oral tablet: 1 tab(s) orally once a day  valsartan 160 mg oral tablet: 1 tab(s) orally once a day

## 2022-06-24 NOTE — DISCHARGE NOTE PROVIDER - CARE PROVIDER_API CALL
Dixie Mercedes)  Infectious Disease; Internal Medicine  [address] [place] [address]  Phone: [phone]  Fax: [phone]  Established Patient  Follow Up Time: 2 weeks    Renee Sam  HEMATOLOGY  [address] [place] [address]  Phone: [phone]  Fax: [phone]  Established Patient  Follow Up Time: 2 weeks   Dixie Mercedes)  Infectious Disease; Internal Medicine  [address] [place] [address]  Phone: [phone]  Fax: [phone]  Established Patient  Follow Up Time: 2 weeks    Renee Sam  HEMATOLOGY  [address] [place] [address]  Phone: [phone]  Fax: [phone]  Established Patient  Follow Up Time: 2 weeks    Edward Foley  FAMILY MEDICINE  [address] [place] [address]  Phone: [phone]  Fax: [phone]  Established Patient  Follow Up Time: 1 week

## 2022-06-24 NOTE — PHYSICAL THERAPY INITIAL EVALUATION ADULT - IMPAIRMENTS FOUND, PT EVAL
Reason for Call:  Medication or medication refill:    Do you use a [facility]?  Name of the pharmacy and phone number for the current request: [facility] #[id number] [address] AT [address]    Name of the medication requested: methocarbamol (ROBAXIN) 500 MG tablet    Other request: N/A    Can we leave a detailed message on this number? YES    Phone number patient can be reached at: Home number on file [phone] (home)    Best Time: Any    Call taken on 1/10/2022 at 1:07 PM by Romelia Fernández        
Requested Prescriptions   Pending Prescriptions Disp Refills     methocarbamol (ROBAXIN) 500 MG tablet 30 tablet 1     Sig: Take 1 tablet (500 mg) by mouth 4 times daily as needed for muscle spasms       There is no refill protocol information for this order      Routing refill request to provider for review/approval because:  Drug not on the [facility] refill protocol         Thanks,  JUNE Aguillon  [facility]     
gait, locomotion, and balance

## 2022-06-24 NOTE — DISCHARGE NOTE PROVIDER - NSDCFUSCHEDAPPT_GEN_ALL_CORE_FT
Jose Daniel Nugent Physician [facility]  NEUROLOGY [address]  Scheduled Appointment: 08/30/2022

## 2022-06-24 NOTE — DISCHARGE NOTE PROVIDER - NSFOLLOWUPCLINICS_GEN_ALL_ED_FT
[facility]  ENT  [address], [address]  [place] [address]  Phone: [phone]  Fax:

## 2022-06-24 NOTE — PHYSICAL THERAPY INITIAL EVALUATION ADULT - PLANNED THERAPY INTERVENTIONS, PT EVAL
Stairs Goal: Pt will go up/down 3 steps with + handrail independently in 2 weeks./balance training/gait training/strengthening/transfer training

## 2022-06-24 NOTE — PROGRESS NOTE ADULT - PROBLEM SELECTOR PLAN 1
-Having fever, cough and dysuria at home. Pt with Tmax of 100.8 and BP of 102/66 in the ED  -Sepsis likely 2/2 PNA vs UTI   -Pt discharged on 6/10 with cefpodoxime and azithromycin with no improvement in symptoms   -CXR shows new right upper lobe opacity concerning for pneumonia  -UA + for large leukocyte esterase, WBC and bacteria. Pt also symptomatic  -s/p Vanc and Cefepime In the ED. Will continue vanc and cefepime  -s/p 500cc bolus in the ED. Pt given additional patient 1L LR bolus   -BCx NGTD, Procal and UCx pending -Having fever, cough and dysuria at home. Pt with Tmax of 100.8 and BP of 102/66 in the ED  -Sepsis likely 2/2 PNA vs UTI   -Pt discharged on 6/10 with cefpodoxime and azithromycin with no improvement in symptoms   -CXR shows new right upper lobe opacity concerning for pneumonia  -UA + for large leukocyte esterase, WBC and bacteria. Pt also symptomatic  -s/p Vanc and Cefepime In the ED. Will cont cefepime  -s/p 500cc bolus in the ED. Pt given additional patient 1L LR bolus   -BCx NGTD, Procal and UCx pending. MRSA neg  -ID consulted, will f/u recs

## 2022-06-25 DIAGNOSIS — U07.1 COVID-19: ICD-10-CM

## 2022-06-25 LAB
ALBUMIN SERPL ELPH-MCNC: 3.2 G/DL — LOW (ref 3.3–5)
ALP SERPL-CCNC: 72 U/L — SIGNIFICANT CHANGE UP (ref 40–120)
ALT FLD-CCNC: 21 U/L — SIGNIFICANT CHANGE UP (ref 10–45)
ANION GAP SERPL CALC-SCNC: 6 MMOL/L — SIGNIFICANT CHANGE UP (ref 5–17)
AST SERPL-CCNC: 28 U/L — SIGNIFICANT CHANGE UP (ref 10–40)
BILIRUB DIRECT SERPL-MCNC: <0.1 MG/DL — SIGNIFICANT CHANGE UP (ref 0–0.3)
BILIRUB INDIRECT FLD-MCNC: >0.3 MG/DL — SIGNIFICANT CHANGE UP (ref 0.2–1)
BILIRUB SERPL-MCNC: 0.4 MG/DL — SIGNIFICANT CHANGE UP (ref 0.2–1.2)
BUN SERPL-MCNC: 9 MG/DL — SIGNIFICANT CHANGE UP (ref 7–23)
CALCIUM SERPL-MCNC: 8.2 MG/DL — LOW (ref 8.4–10.5)
CHLORIDE SERPL-SCNC: 99 MMOL/L — SIGNIFICANT CHANGE UP (ref 96–108)
CO2 SERPL-SCNC: 28 MMOL/L — SIGNIFICANT CHANGE UP (ref 22–31)
CREAT SERPL-MCNC: 0.61 MG/DL — SIGNIFICANT CHANGE UP (ref 0.5–1.3)
CREAT SERPL-MCNC: 0.63 MG/DL — SIGNIFICANT CHANGE UP (ref 0.5–1.3)
CRP SERPL-MCNC: 107 MG/L — HIGH (ref 0–4)
D DIMER BLD IA.RAPID-MCNC: 403 NG/ML DDU — HIGH
EGFR: 98 ML/MIN/1.73M2 — SIGNIFICANT CHANGE UP
EGFR: 99 ML/MIN/1.73M2 — SIGNIFICANT CHANGE UP
FERRITIN SERPL-MCNC: 296 NG/ML — HIGH (ref 15–150)
GLUCOSE SERPL-MCNC: 107 MG/DL — HIGH (ref 70–99)
GRAM STN FLD: SIGNIFICANT CHANGE UP
HCT VFR BLD CALC: 27.9 % — LOW (ref 34.5–45)
HGB BLD-MCNC: 9.2 G/DL — LOW (ref 11.5–15.5)
MAGNESIUM SERPL-MCNC: 2.2 MG/DL — SIGNIFICANT CHANGE UP (ref 1.6–2.6)
MCHC RBC-ENTMCNC: 29.4 PG — SIGNIFICANT CHANGE UP (ref 27–34)
MCHC RBC-ENTMCNC: 33 GM/DL — SIGNIFICANT CHANGE UP (ref 32–36)
MCV RBC AUTO: 89.1 FL — SIGNIFICANT CHANGE UP (ref 80–100)
NIGHT BLUE STAIN TISS: SIGNIFICANT CHANGE UP
NRBC # BLD: 0 /100 WBCS — SIGNIFICANT CHANGE UP (ref 0–0)
PHOSPHATE SERPL-MCNC: 3.2 MG/DL — SIGNIFICANT CHANGE UP (ref 2.5–4.5)
PLATELET # BLD AUTO: 246 K/UL — SIGNIFICANT CHANGE UP (ref 150–400)
POTASSIUM SERPL-MCNC: 3.8 MMOL/L — SIGNIFICANT CHANGE UP (ref 3.5–5.3)
POTASSIUM SERPL-SCNC: 3.8 MMOL/L — SIGNIFICANT CHANGE UP (ref 3.5–5.3)
PROT SERPL-MCNC: 8.6 G/DL — HIGH (ref 6–8.3)
RBC # BLD: 3.13 M/UL — LOW (ref 3.8–5.2)
RBC # FLD: 12.5 % — SIGNIFICANT CHANGE UP (ref 10.3–14.5)
SODIUM SERPL-SCNC: 133 MMOL/L — LOW (ref 135–145)
SPECIMEN SOURCE: SIGNIFICANT CHANGE UP
SPECIMEN SOURCE: SIGNIFICANT CHANGE UP
WBC # BLD: 9.66 K/UL — SIGNIFICANT CHANGE UP (ref 3.8–10.5)
WBC # FLD AUTO: 9.66 K/UL — SIGNIFICANT CHANGE UP (ref 3.8–10.5)

## 2022-06-25 PROCEDURE — 99233 SBSQ HOSP IP/OBS HIGH 50: CPT

## 2022-06-25 PROCEDURE — 99233 SBSQ HOSP IP/OBS HIGH 50: CPT | Mod: GC

## 2022-06-25 RX ORDER — GABAPENTIN 400 MG/1
200 CAPSULE ORAL
Refills: 0 | Status: DISCONTINUED | OUTPATIENT
Start: 2022-06-25 | End: 2022-07-01

## 2022-06-25 RX ORDER — REMDESIVIR 5 MG/ML
INJECTION INTRAVENOUS
Refills: 0 | Status: COMPLETED | OUTPATIENT
Start: 2022-06-25 | End: 2022-06-29

## 2022-06-25 RX ORDER — REMDESIVIR 5 MG/ML
100 INJECTION INTRAVENOUS EVERY 24 HOURS
Refills: 0 | Status: COMPLETED | OUTPATIENT
Start: 2022-06-26 | End: 2022-06-29

## 2022-06-25 RX ORDER — REMDESIVIR 5 MG/ML
200 INJECTION INTRAVENOUS EVERY 24 HOURS
Refills: 0 | Status: COMPLETED | OUTPATIENT
Start: 2022-06-25 | End: 2022-06-25

## 2022-06-25 RX ADMIN — CEFEPIME 100 MILLIGRAM(S): 1 INJECTION, POWDER, FOR SOLUTION INTRAMUSCULAR; INTRAVENOUS at 13:24

## 2022-06-25 RX ADMIN — Medication 650 MILLIGRAM(S): at 09:26

## 2022-06-25 RX ADMIN — SODIUM CHLORIDE 4 MILLILITER(S): 9 INJECTION INTRAMUSCULAR; INTRAVENOUS; SUBCUTANEOUS at 05:58

## 2022-06-25 RX ADMIN — ESCITALOPRAM OXALATE 5 MILLIGRAM(S): 10 TABLET, FILM COATED ORAL at 13:24

## 2022-06-25 RX ADMIN — Medication 650 MILLIGRAM(S): at 18:06

## 2022-06-25 RX ADMIN — GABAPENTIN 300 MILLIGRAM(S): 400 CAPSULE ORAL at 05:57

## 2022-06-25 RX ADMIN — Medication 100 MILLIGRAM(S): at 21:13

## 2022-06-25 RX ADMIN — Medication 650 MILLIGRAM(S): at 14:48

## 2022-06-25 RX ADMIN — CHLORHEXIDINE GLUCONATE 1 APPLICATION(S): 213 SOLUTION TOPICAL at 05:57

## 2022-06-25 RX ADMIN — REMDESIVIR 500 MILLIGRAM(S): 5 INJECTION INTRAVENOUS at 18:07

## 2022-06-25 RX ADMIN — Medication 650 MILLIGRAM(S): at 18:20

## 2022-06-25 RX ADMIN — ENOXAPARIN SODIUM 40 MILLIGRAM(S): 100 INJECTION SUBCUTANEOUS at 18:06

## 2022-06-25 NOTE — PROGRESS NOTE ADULT - PROBLEM SELECTOR PLAN 4
-Being followed by Dr. Munir Sam of [facility]  -Treatment was tentatively scheduled to start 7/5, Pending second opinion at [facility] on 6/29/2022  -Heme onc following -Pt on losartan and amlodipine at home  -Will hold in the setting of sepsis

## 2022-06-25 NOTE — PROGRESS NOTE ADULT - PROBLEM SELECTOR PLAN 2
-Pt having chronic left leg pain and numbness  -Outpatient x-ray negative for fracture dislocation  -Will cont gabapentin 300mg BID  -Will get PT eval - Pt having chronic left leg pain and numbness 2/2 lumbar disc herniation  - Outpatient x-ray negative for fracture dislocation  - decr gabapentin back to 200mg BID per pt preference  - PT eval appreciated -Having fever, cough and dysuria at home. Pt with Tmax of 100.8 and BP of 102/66 in the ED  -Sepsis likely 2/2 PNA vs UTI   -Pt discharged on 6/10 with cefpodoxime and azithromycin with no improvement in symptoms   -CXR shows new right upper lobe opacity concerning for pneumonia  -UA + for large leukocyte esterase, WBC and bacteria. Pt also symptomatic  -s/p Vanc and Cefepime In the ED. c/w cefepime  -s/p 500cc bolus in the ED. Pt given additional patient 1L LR bolus   -BCx NGTD, MRSA neg  - pending AFB 3x cultures, quantiferon, U histo Ag,

## 2022-06-25 NOTE — PROGRESS NOTE ADULT - SUBJECTIVE AND OBJECTIVE BOX
Follow Up:  COVID19    Interval History:    REVIEW OF SYSTEMS  [  ] ROS unobtainable because:    [  ] All other systems negative except as noted below:     Constitutional:  [ ] fever [ ] chills  [ ] weight loss  [ ] weakness  Skin:  [ ] rash [ ] phlebitis	  Eyes: [ ] icterus [ ] pain  [ ] discharge	  ENMT: [ ] sore throat  [ ] thrush [ ] ulcers [ ] exudates  Respiratory: [  ] dyspnea [ ] hemoptysis [ ] cough [ ] sputum	  Cardiovascular:  [ ] chest pain [ ] palpitations [ ] edema	  Gastrointestinal:  [ ] nausea [ ] vomiting [ ] diarrhea [ ] constipation [ ] pain	  Genitourinary:  [ ] dysuria [ ] frequency [ ] hematuria [ ] discharge [ ] flank pain  [ ] incontinence  Musculoskeletal:  [ ] myalgias [ ] arthralgias [ ] arthritis  [ ] back pain  Neurological:  [ ] headache [ ] seizures  [ ] confusion/altered mental status    Allergies  No Known Allergies        ANTIMICROBIALS:  remdesivir  IVPB    remdesivir  IVPB 200 every 24 hours      OTHER MEDS:  MEDICATIONS  (STANDING):  acetaminophen     Tablet .. 650 every 6 hours PRN  benzonatate 100 every 8 hours  enoxaparin Injectable 40 every 24 hours  escitalopram 5 daily  gabapentin 200 two times a day  sodium chloride 3%  Inhalation 4 every 12 hours      Vital Signs Last 24 Hrs  T(C): 37.6 (25 Jun 2022 12:08), Max: 38.4 (24 Jun 2022 19:34)  T(F): 99.7 (25 Jun 2022 12:08), Max: 101.1 (24 Jun 2022 19:34)  HR: 98 (25 Jun 2022 12:08) (91 - 98)  BP: 120/65 (25 Jun 2022 12:08) (120/65 - 148/72)  BP(mean): --  RR: 18 (25 Jun 2022 12:08) (18 - 18)  SpO2: 94% (25 Jun 2022 12:08) (94% - 95%)    PHYSICAL EXAMINATION:    General: Alert and Awake, NAD  HEENT: PERRL, EOMI  Neck: Supple  Cardiac: RRR, No M/R/G  Resp: CTAB, No Wh/Rh/Ra  Abdomen: NBS, NT/ND, No HSM, No rigidity or guarding  MSK: No LE edema. No Calf tenderness  : No champagne  Skin: No rashes or lesions. Skin is warm and dry to the touch.   Neuro: Alert and Awake. CN 2-12 Grossly intact. Moves all four extremities spontaneously.  Psych: Calm, Pleasant, Cooperative    LABORATORY:                          9.2    9.66  )-----------( 246      ( 25 Jun 2022 07:34 )             27.9       06-25    x   |  x   |  x   ----------------------------<  x   x    |  x   |  0.61    Ca    8.2<L>      25 Jun 2022 07:27  Phos  3.2     06-25  Mg     2.2     06-25    TPro  8.6<H>  /  Alb  3.2<L>  /  TBili  0.4  /  DBili  <0.1  /  AST  28  /  ALT  21  /  AlkPhos  72  06-25          C-Reactive Protein, Serum: 107 mg/L (06-25-22 @ 07:27)      Ferritin, Serum: 296 ng/mL (06-25-22 @ 07:14)      D-Dimer Assay, Quantitative: 403 ng/mL DDU (06-25-22 @ 07:34)      Procalcitonin, Serum: 0.20 ng/mL (06-24-22 @ 07:11)      MICROBIOLOGY:  v  .Sputum Sputum  06-24-22 --  --    Few polymorphonuclear leukocytes per low power field  Numerous Squamous epithelial cells per low power field  Numerous Gram Positive Cocci in Clusters seen per oil power field  Numerous Gram Variable Rods seen per oil power field      Clean Catch Clean Catch (Midstream)  06-23-22   <10,000 CFU/mL Normal Urogenital Sahra  --  --      .Blood Blood-Venous  06-22-22   No growth to date.  --  --      .Blood Blood-Venous  06-22-22   No growth to date.  --  --      Clean Catch Clean Catch (Midstream)  06-11-22   <10,000 CFU/mL Normal Urogenital Sahra  --  --      .Blood Blood  06-11-22   No Growth Final  --  --          Rapid RVP Result: Detected (06-24 @ 20:48)  Rapid RVP Result: NotDetec (06-22 @ 21:33)        RADIOLOGY:    <The imaging below has been reviewed and visualized by me independently. Findings as detailed in report below> Follow Up:  COVID19    Interval History: continued cough. febrile overnight.     REVIEW OF SYSTEMS  [  ] ROS unobtainable because:    [ x  ] All other systems negative except as noted below:     Constitutional:  [x ] fever [x ] chills  [ ] weight loss  [ ] weakness  Skin:  [ ] rash [ ] phlebitis	  Eyes: [ ] icterus [ ] pain  [ ] discharge	  ENMT: [ ] sore throat  [ ] thrush [ ] ulcers [ ] exudates  Respiratory: [  ] dyspnea [ ] hemoptysis [ ] cough [ ] sputum	  Cardiovascular:  [ ] chest pain [ ] palpitations [ ] edema	  Gastrointestinal:  [ ] nausea [ ] vomiting [ ] diarrhea [ ] constipation [ ] pain	  Genitourinary:  [ ] dysuria [ ] frequency [ ] hematuria [ ] discharge [ ] flank pain  [ ] incontinence  Musculoskeletal:  [ ] myalgias [ ] arthralgias [ ] arthritis  [ ] back pain  Neurological:  [ ] headache [ ] seizures  [ ] confusion/altered mental status    Allergies  No Known Allergies        ANTIMICROBIALS:  remdesivir  IVPB    remdesivir  IVPB 200 every 24 hours      OTHER MEDS:  MEDICATIONS  (STANDING):  acetaminophen     Tablet .. 650 every 6 hours PRN  benzonatate 100 every 8 hours  enoxaparin Injectable 40 every 24 hours  escitalopram 5 daily  gabapentin 200 two times a day  sodium chloride 3%  Inhalation 4 every 12 hours      Vital Signs Last 24 Hrs  T(C): 37.6 (25 Jun 2022 12:08), Max: 38.4 (24 Jun 2022 19:34)  T(F): 99.7 (25 Jun 2022 12:08), Max: 101.1 (24 Jun 2022 19:34)  HR: 98 (25 Jun 2022 12:08) (91 - 98)  BP: 120/65 (25 Jun 2022 12:08) (120/65 - 148/72)  BP(mean): --  RR: 18 (25 Jun 2022 12:08) (18 - 18)  SpO2: 94% (25 Jun 2022 12:08) (94% - 95%)    PHYSICAL EXAMINATION:    General: Alert and Awake, NAD  Cardiac: RRR, No M/R/G  Resp: CTAB, No Wh/Rh/Ra  Abdomen: NBS, NT/ND, No HSM, No rigidity or guarding  MSK: No LE edema. No Calf tenderness  : No champagne  Skin: No rashes or lesions. Skin is warm and dry to the touch.   Neuro: Alert and Awake. CN 2-12 Grossly intact. Moves all four extremities spontaneously.  Psych: Calm, Pleasant, Cooperative    LABORATORY:                          9.2    9.66  )-----------( 246      ( 25 Jun 2022 07:34 )             27.9       06-25    x   |  x   |  x   ----------------------------<  x   x    |  x   |  0.61    Ca    8.2<L>      25 Jun 2022 07:27  Phos  3.2     06-25  Mg     2.2     06-25    TPro  8.6<H>  /  Alb  3.2<L>  /  TBili  0.4  /  DBili  <0.1  /  AST  28  /  ALT  21  /  AlkPhos  72  06-25    C-Reactive Protein, Serum: 107 mg/L (06-25-22 @ 07:27)    Ferritin, Serum: 296 ng/mL (06-25-22 @ 07:14)    D-Dimer Assay, Quantitative: 403 ng/mL DDU (06-25-22 @ 07:34)    Procalcitonin, Serum: 0.20 ng/mL (06-24-22 @ 07:11)    MICROBIOLOGY:    .Sputum Sputum  06-24-22 --  --    Few polymorphonuclear leukocytes per low power field  Numerous Squamous epithelial cells per low power field  Numerous Gram Positive Cocci in Clusters seen per oil power field  Numerous Gram Variable Rods seen per oil power field      Clean Catch Clean Catch (Midstream)  06-23-22   <10,000 CFU/mL Normal Urogenital Sahra  --  --      .Blood Blood-Venous  06-22-22   No growth to date.  --  --      .Blood Blood-Venous  06-22-22   No growth to date.  --  --      Clean Catch Clean Catch (Midstream)  06-11-22   <10,000 CFU/mL Normal Urogenital Sahra  --  --      .Blood Blood  06-11-22   No Growth Final  --  --    Rapid RVP Result: Detected (06-24 @ 20:48)  Rapid RVP Result: NotDetec (06-22 @ 21:33)      RADIOLOGY:    <The imaging below has been reviewed and visualized by me independently. Findings as detailed in report below>    < from: CT Chest No Cont (06.24.22 @ 19:31) >  IMPRESSION:  Diffuse peripheral and peribronchovascular groundglass opacities with an   upper to mid lung predominance likely related to Covid-19 infection.    < end of copied text >

## 2022-06-25 NOTE — PROGRESS NOTE ADULT - SUBJECTIVE AND OBJECTIVE BOX
***************************************************************  Leonides Badillo, PGY1  Internal Medicine   pager:  [facility]: [phone]  ***************************************************************    MILLIE SPENCE  67y  MRN: [id number]    Patient is a 67y old  Female who presents with a chief complaint of fever and cough (24 Jun 2022 14:48)      Subjective: no events ON. Denies fever, CP, SOB, abn pain, N/V, dysuria. Tolerating diet.      MEDICATIONS  (STANDING):  cefepime   IVPB      cefepime   IVPB 2000 milliGRAM(s) IV Intermittent every 12 hours  chlorhexidine 2% Cloths 1 Application(s) Topical <User Schedule>  enoxaparin Injectable 40 milliGRAM(s) SubCutaneous every 24 hours  escitalopram 5 milliGRAM(s) Oral daily  gabapentin 300 milliGRAM(s) Oral two times a day  sodium chloride 3%  Inhalation 4 milliLiter(s) Inhalation every 12 hours    MEDICATIONS  (PRN):  acetaminophen     Tablet .. 650 milliGRAM(s) Oral every 6 hours PRN Temp greater or equal to 38C (100.4F), Mild Pain (1 - 3)      Objective:    Vitals: Vital Signs Last 24 Hrs  T(C): 37.5 (06-25-22 @ 06:08), Max: 39.6 (06-24-22 @ 14:23)  T(F): 99.5 (06-25-22 @ 06:08), Max: 103.3 (06-24-22 @ 14:23)  HR: 91 (06-25-22 @ 06:08) (80 - 95)  BP: 148/72 (06-25-22 @ 06:08) (130/62 - 148/72)  BP(mean): --  RR: 18 (06-25-22 @ 06:08) (18 - 18)  SpO2: 94% (06-25-22 @ 06:08) (90% - 97%)            I&O's Summary    23 Jun 2022 07:01  -  24 Jun 2022 07:00  --------------------------------------------------------  IN: 240 mL / OUT: 0 mL / NET: 240 mL    24 Jun 2022 07:01  -  25 Jun 2022 06:26  --------------------------------------------------------  IN: 250 mL / OUT: 0 mL / NET: 250 mL        PHYSICAL EXAM:  GENERAL: NAD  HEAD:  Atraumatic, Normocephalic  EYES: EOMI, conjunctiva and sclera clear  CHEST/LUNG: Clear to auscultation bilaterally; No rales, rhonchi, wheezing, or rubs  HEART: Regular rate and rhythm; No murmurs, rubs, or gallops  ABDOMEN: Soft, Nontender, Nondistended;   SKIN: No rashes or lesions  NERVOUS SYSTEM:  Alert & Oriented X3, no focal deficits    LABS:  06-24    136  |  101  |  9   ----------------------------<  111<H>  3.4<L>   |  26  |  0.70  06-22    133<L>  |  99  |  10  ----------------------------<  99  3.5   |  28  |  0.70    Ca    8.2<L>      24 Jun 2022 07:11  Ca    8.7      22 Jun 2022 21:33  Phos  3.1     06-24  Mg     2.1     06-24    TPro  9.6<H>  /  Alb  3.2<L>  /  TBili  0.5  /  DBili  x   /  AST  18  /  ALT  14  /  AlkPhos  73  06-22                                              9.1    8.94  )-----------( 239      ( 24 Jun 2022 07:14 )             28.2                         10.6   8.53  )-----------( 296      ( 22 Jun 2022 21:33 )             32.6     CAPILLARY BLOOD GLUCOSE          RADIOLOGY & ADDITIONAL TESTS:    Imaging Personally Reviewed:  [x ] YES  [ ] NO    Consultants involved in case:   Consultant(s) Notes Reviewed:  [ x] YES  [ ] NO:   Care Discussed with Consultants/Other Providers [x ] YES  [ ] NO

## 2022-06-25 NOTE — PROGRESS NOTE ADULT - ASSESSMENT
68 yo female with PMHx of recently diagnosed multiple myeloma (not on chemo), HTN p/w fever with T.max of 102 and non productive cough. CXR positive for new right upper lobe opacity concerning for pneumonia

## 2022-06-25 NOTE — PROGRESS NOTE ADULT - PROBLEM SELECTOR PLAN 5
DVT ppx: Lovenox -Being followed by Dr. Munir Sam of [facility]  -Treatment was tentatively scheduled to start 7/5, Pending second opinion at [facility] on 6/29/2022  -[facility] onc following

## 2022-06-25 NOTE — PROGRESS NOTE ADULT - PROBLEM SELECTOR PLAN 1
-Having fever, cough and dysuria at home. Pt with Tmax of 100.8 and BP of 102/66 in the ED  -Sepsis likely 2/2 PNA vs UTI   -Pt discharged on 6/10 with cefpodoxime and azithromycin with no improvement in symptoms   -CXR shows new right upper lobe opacity concerning for pneumonia  -UA + for large leukocyte esterase, WBC and bacteria. Pt also symptomatic  -s/p Vanc and Cefepime In the ED. Will cont cefepime  -s/p 500cc bolus in the ED. Pt given additional patient 1L LR bolus   -BCx NGTD, Procal and UCx pending. MRSA neg  -ID consulted, will f/u recs -Having fever, cough and dysuria at home. Pt with Tmax of 100.8 and BP of 102/66 in the ED  -Sepsis likely 2/2 PNA vs UTI   -Pt discharged on 6/10 with cefpodoxime and azithromycin with no improvement in symptoms   -CXR shows new right upper lobe opacity concerning for pneumonia  -UA + for large leukocyte esterase, WBC and bacteria. Pt also symptomatic  -s/p Vanc and Cefepime In the ED. c/w cefepime  -s/p 500cc bolus in the ED. Pt given additional patient 1L LR bolus   -BCx NGTD, MRSA neg  - pending AFB 3x cultures, quantiferon, U histo Ag,         - positive covid19 6/24; elevated d-dimer, ferritin, CRP consistent with covid19  - start remdisivir, lovenox 6/25  -ID following, pending reccs - Tested positive 6/24;  positive 2 weeks ago  - fully vaccinated, boosted  - elevated d-dimer, ferritin, CRP consistent with covid  - CT chest 6/24 consistent with covid pna  - started remdisivir, lovenox 6/25  - ID following, pending reccs

## 2022-06-25 NOTE — PROGRESS NOTE ADULT - PROBLEM SELECTOR PLAN 3
-Pt on losartan and amlodipine at home  -Will hold in the setting of sepsis - Pt having chronic left leg pain and numbness 2/2 lumbar disc herniation  - Outpatient x-ray negative for fracture dislocation  - decr gabapentin back to 200mg BID per pt preference  - PT eval appreciated

## 2022-06-25 NOTE — PROGRESS NOTE ADULT - ASSESSMENT
66 yo female with PMHx of recently diagnosed multiple myeloma (not on chemo), HTN p/w fever and cough. She was seen at [facility] for similar symptoms 2 weeks ago and was discharged with cefpodoxime and azithromycin. According to arturo she presented with low grade fevers and leg sxs. She was diagnosed with multiple myeloma and the soctor said since you have this diagnosis and fever , you should go to the ER. The ER gave her oral abs and d/john her Per pt, her symptoms did not resolve. She started having a non-productive cough yesterday and had a temp of 102. Pt  and mother tested positive for COVID 3 days ago. She took a home test today which was negative. She also endorses mild dysuria intermittently. Pt states that she has having low grade fever (99 to 100.1) for the past 2 months with left lower leg pain/numbness. She denies any chest pain, SOB, N/V, abdominal pain or dizziness.   pt has been coughing up blood for the past month. Pt believes it is coming from her nose. She can also blow her nose with clots. She went to an ENt who did not find anything   Pt's daughter had MTb when she was [age] and was treated for a year. The patient took care of her at the time  In the ED, pt had a Tmax of 100.8, HR of 110, BP of 154/73 and saturating well on RA. In the ED, pt given 500cc bolus and Vanc + cefepime (23 Jun 2022 12:19)  Pt was started on abs but continues to spike .Covid swab was negative  CXR with RUL infiltrate    A/P    #COVID 19  Likely etiology of recent high grade fevers  However, low grade fevers for weeks (which I doubt is secondary to COVID19)  --Maintain COVID19 Isolation Precautions  --Recommend Ferritin, CRP, D-Dimer every 48-72H  --Recommend Remdesivir x 5 days total - monitor BUN/Cr and LFT's  --No indication for Dexamethasone at present    #Fever  --Given prolonged fevers prior to presentation reasonable to still exclude tuberculosis  --Maintain airborne isolation for now  --Continue Cefepime for now pending sputum culture; procalcitonin minimally elevated  --Follow up on Quantiferon Gold and Urine Histo Ag    #LLE NUMBNESS  Hematology/Oncology called to see patient who is well known to Dr. Munir Sam of [facility] for the management of IgG Lambda multiple myeloma. Skeletal survey showed two small lucencies in the femoral neck but PET/CT was normal and patient has normal renal function and minimal anemia. Her only complaint was peripheral neuropathy in her feet. However, secondary to rising kappa lambda FLC ratio, M protein and other high risk features, treatment was recommended (Dr. Sam recommending daratumumab, bortezomib lenalidomide and dexamethazone) that was tentatively scheduled to start on 7/5/2022. Patient was planning to get a second opinion at [facility] before starting treatment. When the patient was seen by Dr. Sam on 6/17, she was experiencing fevers to 102 and was referred to the ED.  ? was back imaged as well??    Souleymane Crowell M.D.  [facility] Division of Infectious Disease  8AM-5PM Monday - Friday: Available on [employer] Teams  After Hours and Holidays (or if no response on [employer] Teams): Please contact the Infectious Diseases Office at [phone]

## 2022-06-26 LAB
ALBUMIN SERPL ELPH-MCNC: 2.9 G/DL — LOW (ref 3.3–5)
ALP SERPL-CCNC: 79 U/L — SIGNIFICANT CHANGE UP (ref 40–120)
ALT FLD-CCNC: 22 U/L — SIGNIFICANT CHANGE UP (ref 10–45)
ANION GAP SERPL CALC-SCNC: 6 MMOL/L — SIGNIFICANT CHANGE UP (ref 5–17)
AST SERPL-CCNC: 31 U/L — SIGNIFICANT CHANGE UP (ref 10–40)
BILIRUB DIRECT SERPL-MCNC: <0.1 MG/DL — SIGNIFICANT CHANGE UP (ref 0–0.3)
BILIRUB INDIRECT FLD-MCNC: >0.3 MG/DL — SIGNIFICANT CHANGE UP (ref 0.2–1)
BILIRUB SERPL-MCNC: 0.4 MG/DL — SIGNIFICANT CHANGE UP (ref 0.2–1.2)
BUN SERPL-MCNC: 10 MG/DL — SIGNIFICANT CHANGE UP (ref 7–23)
CALCIUM SERPL-MCNC: 8.8 MG/DL — SIGNIFICANT CHANGE UP (ref 8.4–10.5)
CHLORIDE SERPL-SCNC: 98 MMOL/L — SIGNIFICANT CHANGE UP (ref 96–108)
CO2 SERPL-SCNC: 30 MMOL/L — SIGNIFICANT CHANGE UP (ref 22–31)
CREAT SERPL-MCNC: 0.54 MG/DL — SIGNIFICANT CHANGE UP (ref 0.5–1.3)
CREAT SERPL-MCNC: 0.56 MG/DL — SIGNIFICANT CHANGE UP (ref 0.5–1.3)
CRP SERPL-MCNC: 104 MG/L — HIGH (ref 0–4)
CULTURE RESULTS: SIGNIFICANT CHANGE UP
D DIMER BLD IA.RAPID-MCNC: 392 NG/ML DDU — HIGH
EGFR: 101 ML/MIN/1.73M2 — SIGNIFICANT CHANGE UP
EGFR: 101 ML/MIN/1.73M2 — SIGNIFICANT CHANGE UP
FERRITIN SERPL-MCNC: 399 NG/ML — HIGH (ref 15–150)
GLUCOSE SERPL-MCNC: 99 MG/DL — SIGNIFICANT CHANGE UP (ref 70–99)
HCT VFR BLD CALC: 31.8 % — LOW (ref 34.5–45)
HGB BLD-MCNC: 10.1 G/DL — LOW (ref 11.5–15.5)
MAGNESIUM SERPL-MCNC: 2.3 MG/DL — SIGNIFICANT CHANGE UP (ref 1.6–2.6)
MCHC RBC-ENTMCNC: 28.2 PG — SIGNIFICANT CHANGE UP (ref 27–34)
MCHC RBC-ENTMCNC: 31.8 GM/DL — LOW (ref 32–36)
MCV RBC AUTO: 88.8 FL — SIGNIFICANT CHANGE UP (ref 80–100)
NRBC # BLD: 0 /100 WBCS — SIGNIFICANT CHANGE UP (ref 0–0)
PHOSPHATE SERPL-MCNC: 3.4 MG/DL — SIGNIFICANT CHANGE UP (ref 2.5–4.5)
PLATELET # BLD AUTO: 280 K/UL — SIGNIFICANT CHANGE UP (ref 150–400)
POTASSIUM SERPL-MCNC: 3.9 MMOL/L — SIGNIFICANT CHANGE UP (ref 3.5–5.3)
POTASSIUM SERPL-SCNC: 3.9 MMOL/L — SIGNIFICANT CHANGE UP (ref 3.5–5.3)
PROT SERPL-MCNC: 9.3 G/DL — HIGH (ref 6–8.3)
RBC # BLD: 3.58 M/UL — LOW (ref 3.8–5.2)
RBC # FLD: 12.6 % — SIGNIFICANT CHANGE UP (ref 10.3–14.5)
SODIUM SERPL-SCNC: 134 MMOL/L — LOW (ref 135–145)
SPECIMEN SOURCE: SIGNIFICANT CHANGE UP
WBC # BLD: 8.78 K/UL — SIGNIFICANT CHANGE UP (ref 3.8–10.5)
WBC # FLD AUTO: 8.78 K/UL — SIGNIFICANT CHANGE UP (ref 3.8–10.5)

## 2022-06-26 PROCEDURE — 99233 SBSQ HOSP IP/OBS HIGH 50: CPT | Mod: GC

## 2022-06-26 RX ADMIN — SODIUM CHLORIDE 4 MILLILITER(S): 9 INJECTION INTRAMUSCULAR; INTRAVENOUS; SUBCUTANEOUS at 05:31

## 2022-06-26 RX ADMIN — Medication 100 MILLIGRAM(S): at 21:10

## 2022-06-26 RX ADMIN — Medication 100 MILLIGRAM(S): at 17:38

## 2022-06-26 RX ADMIN — ENOXAPARIN SODIUM 40 MILLIGRAM(S): 100 INJECTION SUBCUTANEOUS at 17:39

## 2022-06-26 RX ADMIN — Medication 650 MILLIGRAM(S): at 00:18

## 2022-06-26 RX ADMIN — GABAPENTIN 200 MILLIGRAM(S): 400 CAPSULE ORAL at 17:38

## 2022-06-26 RX ADMIN — GABAPENTIN 200 MILLIGRAM(S): 400 CAPSULE ORAL at 05:39

## 2022-06-26 RX ADMIN — REMDESIVIR 500 MILLIGRAM(S): 5 INJECTION INTRAVENOUS at 17:39

## 2022-06-26 RX ADMIN — Medication 650 MILLIGRAM(S): at 20:00

## 2022-06-26 RX ADMIN — Medication 100 MILLIGRAM(S): at 12:29

## 2022-06-26 RX ADMIN — Medication 650 MILLIGRAM(S): at 15:28

## 2022-06-26 RX ADMIN — ESCITALOPRAM OXALATE 5 MILLIGRAM(S): 10 TABLET, FILM COATED ORAL at 12:29

## 2022-06-26 RX ADMIN — CHLORHEXIDINE GLUCONATE 1 APPLICATION(S): 213 SOLUTION TOPICAL at 05:31

## 2022-06-26 RX ADMIN — Medication 650 MILLIGRAM(S): at 13:33

## 2022-06-26 RX ADMIN — Medication 650 MILLIGRAM(S): at 01:30

## 2022-06-26 RX ADMIN — Medication 100 MILLIGRAM(S): at 05:30

## 2022-06-26 RX ADMIN — SODIUM CHLORIDE 4 MILLILITER(S): 9 INJECTION INTRAMUSCULAR; INTRAVENOUS; SUBCUTANEOUS at 17:39

## 2022-06-26 RX ADMIN — Medication 650 MILLIGRAM(S): at 19:29

## 2022-06-26 NOTE — PROGRESS NOTE ADULT - ASSESSMENT
68 yo female with PMHx of recently diagnosed multiple myeloma (not on chemo), HTN p/w fever with T.max of 102 and non productive cough. CXR positive for new right upper lobe opacity concerning for pneumonia 66 yo female with PMHx of recently diagnosed multiple myeloma (not on chemo), HTN p/w fever with T.max of 102 and non productive cough. Likely COVID pneumonia, however will rule out TB.

## 2022-06-26 NOTE — PROGRESS NOTE ADULT - PROBLEM SELECTOR PLAN 4
-Pt on losartan and amlodipine at home  -Will hold in the setting of sepsis -Pt on losartan and amlodipine at home  -Will hold in the setting of sepsis, restart as able

## 2022-06-26 NOTE — PROVIDER CONTACT NOTE (OTHER) - BACKGROUND
Patient admitted for PNA
Recently diagnosed with multiple myeloma & positive for new right upper lobe opacity --> pneumonia
covid +  r/o TB

## 2022-06-26 NOTE — PROGRESS NOTE ADULT - SUBJECTIVE AND OBJECTIVE BOX
***************************************************************  Ruth Parnell, PGY2  Internal Medicine   pager: [facility]: [phone], [facility]: [phone]  ***************************************************************    MILLIE SPENCE  67y  MRN: [id number]    Patient is a 67y old  Female who presents with a chief complaint of fever and cough (25 Jun 2022 16:42)      Subjective: no events ON. Denies fever, CP, SOB, abn pain, N/V, dysuria. Tolerating diet.      MEDICATIONS  (STANDING):  benzonatate 100 milliGRAM(s) Oral every 8 hours  chlorhexidine 2% Cloths 1 Application(s) Topical <User Schedule>  enoxaparin Injectable 40 milliGRAM(s) SubCutaneous every 24 hours  escitalopram 5 milliGRAM(s) Oral daily  gabapentin 200 milliGRAM(s) Oral two times a day  remdesivir  IVPB   IV Intermittent   remdesivir  IVPB 100 milliGRAM(s) IV Intermittent every 24 hours  sodium chloride 3%  Inhalation 4 milliLiter(s) Inhalation every 12 hours    MEDICATIONS  (PRN):  acetaminophen     Tablet .. 650 milliGRAM(s) Oral every 6 hours PRN Temp greater or equal to 38C (100.4F), Mild Pain (1 - 3)      Objective:    Vitals: Vital Signs Last 24 Hrs  T(C): 37.4 (06-26-22 @ 06:29), Max: 38.2 (06-25-22 @ 09:13)  T(F): 99.3 (06-26-22 @ 06:29), Max: 100.8 (06-25-22 @ 09:13)  HR: 83 (06-26-22 @ 06:29) (83 - 98)  BP: 137/84 (06-26-22 @ 06:29) (120/65 - 140/68)  BP(mean): --  RR: 19 (06-26-22 @ 06:29) (18 - 19)  SpO2: 97% (06-26-22 @ 06:29) (94% - 97%)            I&O's Summary    25 Jun 2022 07:01  -  26 Jun 2022 07:00  --------------------------------------------------------  IN: 1200 mL / OUT: 1 mL / NET: 1199 mL        PHYSICAL EXAM:  GENERAL: NAD  HEAD:  Atraumatic, Normocephalic  EYES: EOMI, conjunctiva and sclera clear  CHEST/LUNG: Clear to auscultation bilaterally; No rales, rhonchi, wheezing, or rubs  HEART: Regular rate and rhythm; No murmurs, rubs, or gallops  ABDOMEN: Soft, Nontender, Nondistended;   SKIN: No rashes or lesions  NERVOUS SYSTEM:  Alert & Oriented X3, no focal deficits    LABS:  06-25    x   |  x   |  x   ----------------------------<  x   x    |  x   |  0.61  06-25    133<L>  |  99  |  9   ----------------------------<  107<H>  3.8   |  28  |  0.63  06-24    136  |  101  |  9   ----------------------------<  111<H>  3.4<L>   |  26  |  0.70    Ca    8.2<L>      25 Jun 2022 07:27  Ca    8.2<L>      24 Jun 2022 07:11  Phos  3.2     06-25  Mg     2.2     06-25    TPro  8.6<H>  /  Alb  3.2<L>  /  TBili  0.4  /  DBili  <0.1  /  AST  28  /  ALT  21  /  AlkPhos  72  06-25                                              9.2    9.66  )-----------( 246      ( 25 Jun 2022 07:34 )             27.9                         9.1    8.94  )-----------( 239      ( 24 Jun 2022 07:14 )             28.2     CAPILLARY BLOOD GLUCOSE          RADIOLOGY & ADDITIONAL TESTS:    Imaging Personally Reviewed:  [x ] YES  [ ] NO    Consultants involved in case:   Consultant(s) Notes Reviewed:  [ x] YES  [ ] NO:   Care Discussed with Consultants/Other Providers [x ] YES  [ ] NO         ***************************************************************  Ruth Parnell, PGY2  Internal Medicine   pager: [facility]: [phone], [facility]: [phone]  ***************************************************************    MILLIE SPENCE  67y  MRN: [id number]    Patient is a 67y old  Female who presents with a chief complaint of fever and cough (25 Jun 2022 16:42)      Subjective: no events ON. Still having fevers, severe cough productive with sputum.     MEDICATIONS  (STANDING):  benzonatate 100 milliGRAM(s) Oral every 8 hours  chlorhexidine 2% Cloths 1 Application(s) Topical <User Schedule>  enoxaparin Injectable 40 milliGRAM(s) SubCutaneous every 24 hours  escitalopram 5 milliGRAM(s) Oral daily  gabapentin 200 milliGRAM(s) Oral two times a day  remdesivir  IVPB   IV Intermittent   remdesivir  IVPB 100 milliGRAM(s) IV Intermittent every 24 hours  sodium chloride 3%  Inhalation 4 milliLiter(s) Inhalation every 12 hours    MEDICATIONS  (PRN):  acetaminophen     Tablet .. 650 milliGRAM(s) Oral every 6 hours PRN Temp greater or equal to 38C (100.4F), Mild Pain (1 - 3)      Objective:    Vitals: Vital Signs Last 24 Hrs  T(C): 37.4 (06-26-22 @ 06:29), Max: 38.2 (06-25-22 @ 09:13)  T(F): 99.3 (06-26-22 @ 06:29), Max: 100.8 (06-25-22 @ 09:13)  HR: 83 (06-26-22 @ 06:29) (83 - 98)  BP: 137/84 (06-26-22 @ 06:29) (120/65 - 140/68)  BP(mean): --  RR: 19 (06-26-22 @ 06:29) (18 - 19)  SpO2: 97% (06-26-22 @ 06:29) (94% - 97%)            I&O's Summary    25 Jun 2022 07:01  -  26 Jun 2022 07:00  --------------------------------------------------------  IN: 1200 mL / OUT: 1 mL / NET: 1199 mL        PHYSICAL EXAM:  GENERAL: NAD  HEAD:  Atraumatic, Normocephalic  EYES: EOMI, conjunctiva and sclera clear  CHEST/LUNG: Clear to auscultation bilaterally; No rales, rhonchi, wheezing, or rubs  HEART: Regular rate and rhythm; No murmurs, rubs, or gallops  ABDOMEN: Soft, Nontender, Nondistended;   SKIN: No rashes or lesions  NERVOUS SYSTEM:  Alert & Oriented X3, no focal deficits    LABS:  06-25    x   |  x   |  x   ----------------------------<  x   x    |  x   |  0.61  06-25    133<L>  |  99  |  9   ----------------------------<  107<H>  3.8   |  28  |  0.63  06-24    136  |  101  |  9   ----------------------------<  111<H>  3.4<L>   |  26  |  0.70    Ca    8.2<L>      25 Jun 2022 07:27  Ca    8.2<L>      24 Jun 2022 07:11  Phos  3.2     06-25  Mg     2.2     06-25    TPro  8.6<H>  /  Alb  3.2<L>  /  TBili  0.4  /  DBili  <0.1  /  AST  28  /  ALT  21  /  AlkPhos  72  06-25                                              9.2    9.66  )-----------( 246      ( 25 Jun 2022 07:34 )             27.9                         9.1    8.94  )-----------( 239      ( 24 Jun 2022 07:14 )             28.2     CAPILLARY BLOOD GLUCOSE          RADIOLOGY & ADDITIONAL TESTS:    Imaging Personally Reviewed:  [x ] YES  [ ] NO    Consultants involved in case:   Consultant(s) Notes Reviewed:  [ x] YES  [ ] NO:   Care Discussed with Consultants/Other Providers [x ] YES  [ ] NO

## 2022-06-26 NOTE — PROGRESS NOTE ADULT - PROBLEM SELECTOR PLAN 2
-Having fever, cough and dysuria at home. Pt with Tmax of 100.8 and BP of 102/66 in the ED  -Sepsis likely 2/2 PNA vs UTI   -Pt discharged on 6/10 with cefpodoxime and azithromycin with no improvement in symptoms   -CXR shows new right upper lobe opacity concerning for pneumonia  -UA + for large leukocyte esterase, WBC and bacteria. Pt also symptomatic  -s/p Vanc and Cefepime In the ED. c/w cefepime  -s/p 500cc bolus in the ED. Pt given additional patient 1L LR bolus   -BCx NGTD, MRSA neg  - pending AFB 3x cultures, quantiferon, U histo Ag, sepsis likely 2/2 COVID PNA however will rule out TB as ID is skeptical all sxs related to covid as she has had sxs for the past month  -BCx NGTD, MRSA neg  -cefepime dc'd as sputum culture nonspecific, no other evidence of bacterial pneumonia    Plan:  - pending AFB 3x cultures, quantiferon, U histo Ag,

## 2022-06-26 NOTE — PROGRESS NOTE ADULT - PROBLEM SELECTOR PLAN 1
- Tested positive 6/24;  positive 2 weeks ago  - fully vaccinated, boosted  - elevated d-dimer, ferritin, CRP consistent with covid  - CT chest 6/24 consistent with covid pna  - started remdisivir, lovenox 6/25  - ID following, pending reccs - Tested positive 6/24;  positive 2 weeks ago  - fully vaccinated, boosted  - elevated d-dimer, ferritin, CRP consistent with covid  - CT chest 6/24 consistent with covid pna  - not a candidate for monoclonal antibiodies per [facility]A since she presented with covid    Plan:  - started remdisivir x5d (6/25 - )  -monitor ferritin, d-dimer, CRP q48-72  - ID following, pending recs  -symptomatic tx of cough with tessalon perles, robitussin, hycodan for severe cough

## 2022-06-26 NOTE — PROGRESS NOTE ADULT - PROBLEM SELECTOR PLAN 3
- Pt having chronic left leg pain and numbness 2/2 lumbar disc herniation  - Outpatient x-ray negative for fracture dislocation  - decr gabapentin back to 200mg BID per pt preference  - PT eval appreciated

## 2022-06-26 NOTE — PROGRESS NOTE ADULT - PROBLEM SELECTOR PLAN 5
-Being followed by Dr. Munir Sam of [facility]  -Treatment was tentatively scheduled to start 7/5, Pending second opinion at [facility] on 6/29/2022  -Heme onc following

## 2022-06-27 LAB
ALBUMIN SERPL ELPH-MCNC: 2.7 G/DL — LOW (ref 3.3–5)
ALP SERPL-CCNC: 73 U/L — SIGNIFICANT CHANGE UP (ref 40–120)
ALT FLD-CCNC: 18 U/L — SIGNIFICANT CHANGE UP (ref 10–45)
ANION GAP SERPL CALC-SCNC: 10 MMOL/L — SIGNIFICANT CHANGE UP (ref 5–17)
AST SERPL-CCNC: 25 U/L — SIGNIFICANT CHANGE UP (ref 10–40)
BASOPHILS # BLD AUTO: 0 K/UL — SIGNIFICANT CHANGE UP (ref 0–0.2)
BASOPHILS NFR BLD AUTO: 0 % — SIGNIFICANT CHANGE UP (ref 0–2)
BILIRUB DIRECT SERPL-MCNC: <0.1 MG/DL — SIGNIFICANT CHANGE UP (ref 0–0.3)
BILIRUB INDIRECT FLD-MCNC: >0.4 MG/DL — SIGNIFICANT CHANGE UP (ref 0.2–1)
BILIRUB SERPL-MCNC: 0.5 MG/DL — SIGNIFICANT CHANGE UP (ref 0.2–1.2)
BUN SERPL-MCNC: 11 MG/DL — SIGNIFICANT CHANGE UP (ref 7–23)
CALCIUM SERPL-MCNC: 8.3 MG/DL — LOW (ref 8.4–10.5)
CHLORIDE SERPL-SCNC: 98 MMOL/L — SIGNIFICANT CHANGE UP (ref 96–108)
CO2 SERPL-SCNC: 25 MMOL/L — SIGNIFICANT CHANGE UP (ref 22–31)
CREAT SERPL-MCNC: 0.54 MG/DL — SIGNIFICANT CHANGE UP (ref 0.5–1.3)
CREAT SERPL-MCNC: 0.55 MG/DL — SIGNIFICANT CHANGE UP (ref 0.5–1.3)
EGFR: 101 ML/MIN/1.73M2 — SIGNIFICANT CHANGE UP
EGFR: 101 ML/MIN/1.73M2 — SIGNIFICANT CHANGE UP
EOSINOPHIL # BLD AUTO: 0.09 K/UL — SIGNIFICANT CHANGE UP (ref 0–0.5)
EOSINOPHIL NFR BLD AUTO: 0.9 % — SIGNIFICANT CHANGE UP (ref 0–6)
GLUCOSE SERPL-MCNC: 102 MG/DL — HIGH (ref 70–99)
HCT VFR BLD CALC: 30.3 % — LOW (ref 34.5–45)
HGB BLD-MCNC: 9.8 G/DL — LOW (ref 11.5–15.5)
LYMPHOCYTES # BLD AUTO: 3.1 K/UL — SIGNIFICANT CHANGE UP (ref 1–3.3)
LYMPHOCYTES # BLD AUTO: 30.4 % — SIGNIFICANT CHANGE UP (ref 13–44)
LYMPHOCYTES # SPEC AUTO: 11.6 % — HIGH (ref 0–0)
MAGNESIUM SERPL-MCNC: 2.1 MG/DL — SIGNIFICANT CHANGE UP (ref 1.6–2.6)
MANUAL DIF COMMENT BLD-IMP: SIGNIFICANT CHANGE UP
MANUAL SMEAR VERIFICATION: SIGNIFICANT CHANGE UP
MCHC RBC-ENTMCNC: 28.7 PG — SIGNIFICANT CHANGE UP (ref 27–34)
MCHC RBC-ENTMCNC: 32.3 GM/DL — SIGNIFICANT CHANGE UP (ref 32–36)
MCV RBC AUTO: 88.6 FL — SIGNIFICANT CHANGE UP (ref 80–100)
MONOCYTES # BLD AUTO: 0.82 K/UL — SIGNIFICANT CHANGE UP (ref 0–0.9)
MONOCYTES NFR BLD AUTO: 8 % — SIGNIFICANT CHANGE UP (ref 2–14)
NEUTROPHILS # BLD AUTO: 5.01 K/UL — SIGNIFICANT CHANGE UP (ref 1.8–7.4)
NEUTROPHILS NFR BLD AUTO: 49.1 % — SIGNIFICANT CHANGE UP (ref 43–77)
NRBC # BLD: 1 /100 — HIGH (ref 0–0)
PHOSPHATE SERPL-MCNC: 3.3 MG/DL — SIGNIFICANT CHANGE UP (ref 2.5–4.5)
PLAT MORPH BLD: NORMAL — SIGNIFICANT CHANGE UP
PLATELET # BLD AUTO: 299 K/UL — SIGNIFICANT CHANGE UP (ref 150–400)
POTASSIUM SERPL-MCNC: 3.9 MMOL/L — SIGNIFICANT CHANGE UP (ref 3.5–5.3)
POTASSIUM SERPL-SCNC: 3.9 MMOL/L — SIGNIFICANT CHANGE UP (ref 3.5–5.3)
PROT SERPL-MCNC: 9 G/DL — HIGH (ref 6–8.3)
RBC # BLD: 3.42 M/UL — LOW (ref 3.8–5.2)
RBC # FLD: 13.2 % — SIGNIFICANT CHANGE UP (ref 10.3–14.5)
RBC BLD AUTO: SIGNIFICANT CHANGE UP
SODIUM SERPL-SCNC: 133 MMOL/L — LOW (ref 135–145)
WBC # BLD: 10.21 K/UL — SIGNIFICANT CHANGE UP (ref 3.8–10.5)
WBC # FLD AUTO: 10.21 K/UL — SIGNIFICANT CHANGE UP (ref 3.8–10.5)

## 2022-06-27 PROCEDURE — 99232 SBSQ HOSP IP/OBS MODERATE 35: CPT

## 2022-06-27 PROCEDURE — 99233 SBSQ HOSP IP/OBS HIGH 50: CPT | Mod: GC

## 2022-06-27 PROCEDURE — 85060 BLOOD SMEAR INTERPRETATION: CPT

## 2022-06-27 RX ADMIN — ESCITALOPRAM OXALATE 5 MILLIGRAM(S): 10 TABLET, FILM COATED ORAL at 11:18

## 2022-06-27 RX ADMIN — Medication 100 MILLIGRAM(S): at 22:45

## 2022-06-27 RX ADMIN — CHLORHEXIDINE GLUCONATE 1 APPLICATION(S): 213 SOLUTION TOPICAL at 05:54

## 2022-06-27 RX ADMIN — Medication 100 MILLIGRAM(S): at 22:42

## 2022-06-27 RX ADMIN — REMDESIVIR 500 MILLIGRAM(S): 5 INJECTION INTRAVENOUS at 17:27

## 2022-06-27 RX ADMIN — GABAPENTIN 200 MILLIGRAM(S): 400 CAPSULE ORAL at 05:54

## 2022-06-27 RX ADMIN — Medication 100 MILLIGRAM(S): at 14:03

## 2022-06-27 RX ADMIN — SODIUM CHLORIDE 4 MILLILITER(S): 9 INJECTION INTRAMUSCULAR; INTRAVENOUS; SUBCUTANEOUS at 05:54

## 2022-06-27 RX ADMIN — Medication 100 MILLIGRAM(S): at 05:53

## 2022-06-27 RX ADMIN — ENOXAPARIN SODIUM 40 MILLIGRAM(S): 100 INJECTION SUBCUTANEOUS at 17:28

## 2022-06-27 RX ADMIN — Medication 650 MILLIGRAM(S): at 05:58

## 2022-06-27 RX ADMIN — SODIUM CHLORIDE 4 MILLILITER(S): 9 INJECTION INTRAMUSCULAR; INTRAVENOUS; SUBCUTANEOUS at 17:28

## 2022-06-27 RX ADMIN — GABAPENTIN 200 MILLIGRAM(S): 400 CAPSULE ORAL at 17:28

## 2022-06-27 NOTE — PROGRESS NOTE ADULT - PROBLEM SELECTOR PLAN 5
-Being followed by Dr. Munir Sam of [facility]  -Treatment was tentatively scheduled to start 7/5, Pending second opinion at [facility] on 6/29/2022  -Heme onc following

## 2022-06-27 NOTE — PROGRESS NOTE ADULT - ASSESSMENT
68 yo female with PMHx of multiple myeloma (not on chemo), HTN p/w fever.  Patient reports that she has had fever, cough and body aches since yesterday.  Tmax 102.  Patient states that she was seen at [facility] 2 weeks ago with similar symptoms.  Was diagnosed with pneumonia and discharged home on abx.  Patient initially felt better, but symptoms never completely resolved.   tested positive for covid yesterday.  Took home covid test that was negative miquel.  Denies CP, abd pain, NVD, dysuria    Hematology/Oncology called to see patient who is well known to Dr. Munir Sam of [facility] for the management of IgG Lambda multiple myeloma. Skeletal survey showed two small lucencies in the femoral neck but PET/CT was normal and patient has normal renal function and minimal anemia. Her only complaint was peripheral neuropathy in her feet. However, secondary to rising kappa lambda FLC ratio, M protein and other high risk features, treatment was recommended (Dr. Sam recommending daratumumab, bortezomib lenalidomide and dexamethazone) that was tentatively scheduled to start on 7/5/2022. Patient was planning to get a second opinion at [facility] before starting treatment. When the patient was seen by Dr. Sam on 6/17, she was experiencing fevers to 102 and was referred to the ED.    IgG Lambda Multiple Myeloma  --Being followed by Dr. Munir Sam of [facility]  --Treatment was tentatively scheduled to start 7/5  --Pending second opinion at [facility] on 6/29/2022 - recommended to family to move appointment     Fever  --Recently treated for PNA  --CXR shows RUL opacity concerning for recurrent/persistent PNA  --Family exposure  --Now COVID PCR positive  --Antibacterial antibiotics DCd  --Started on Remdesivir 6/25  --Management per primary team, ID, Pulmonary if indicated    Anemia  --Mild, stable per labs taken at [facility] office  --Consistent with chronic disease - myeloma  --Please transfuse PRBC's for Hgb <7.0 grams    After discharge patient may resume care with Dr. Munir Sam of [facility].    Thank you for the opportunity to participate in Ms. Crespo's care.    Serjio Donis PA-C  Hematology/Oncology  [facility]   [phone] (office)  [phone] (alt office)  Evenings and weekends please call MD on call or office

## 2022-06-27 NOTE — PROGRESS NOTE ADULT - SUBJECTIVE AND OBJECTIVE BOX
Patient is a 67y old  Female who presents with a chief complaint of fever and cough (27 Jun 2022 07:32)    Patient seen this morning. Coughing and running a low grade fever. Started Remdesivir on 6/25. Spoke with patient's daughter on telephone.    MEDICATIONS  (STANDING):  benzonatate 100 milliGRAM(s) Oral every 8 hours  chlorhexidine 2% Cloths 1 Application(s) Topical <User Schedule>  enoxaparin Injectable 40 milliGRAM(s) SubCutaneous every 24 hours  escitalopram 5 milliGRAM(s) Oral daily  gabapentin 200 milliGRAM(s) Oral two times a day  remdesivir  IVPB   IV Intermittent   remdesivir  IVPB 100 milliGRAM(s) IV Intermittent every 24 hours  sodium chloride 3%  Inhalation 4 milliLiter(s) Inhalation every 12 hours    MEDICATIONS  (PRN):  acetaminophen     Tablet .. 650 milliGRAM(s) Oral every 6 hours PRN Temp greater or equal to 38C (100.4F), Mild Pain (1 - 3)  guaiFENesin Oral Liquid (Sugar-Free) 100 milliGRAM(s) Oral every 6 hours PRN Cough  hydrocodone/homatropine Syrup 5 milliLiter(s) Oral every 6 hours PRN Cough      Vital Signs Last 24 Hrs  T(C): 37 (27 Jun 2022 08:17), Max: 38.1 (27 Jun 2022 06:02)  T(F): 98.6 (27 Jun 2022 08:17), Max: 100.5 (27 Jun 2022 06:02)  HR: 81 (27 Jun 2022 08:17) (81 - 89)  BP: 138/62 (27 Jun 2022 08:17) (126/56 - 149/65)  BP(mean): --  RR: 18 (27 Jun 2022 08:17) (18 - 18)  SpO2: 99% (27 Jun 2022 08:17) (95% - 99%)    PE  NAD  Awake, alert  Anicteric, MMM  No c/c/e  No rash grossly                            9.8    10.21 )-----------( 299      ( 27 Jun 2022 07:24 )             30.3       06-27    133<L>  |  98  |  11  ----------------------------<  102<H>  3.9   |  25  |  0.55    Ca    8.3<L>      27 Jun 2022 07:24  Phos  3.3     06-27  Mg     2.1     06-27    TPro  9.0<H>  /  Alb  2.7<L>  /  TBili  0.5  /  DBili  <0.1  /  AST  25  /  ALT  18  /  AlkPhos  73  06-27

## 2022-06-27 NOTE — PROGRESS NOTE ADULT - ASSESSMENT
66 yo female with PMHx of recently diagnosed multiple myeloma (not on chemo), HTN p/w fever and cough. She was seen at [facility] for similar symptoms 2 weeks ago and was discharged with cefpodoxime and azithromycin. According to arturo she presented with low grade fevers and leg sxs. She was diagnosed with multiple myeloma and the soctor said since you have this diagnosis and fever , you should go to the ER. The ER gave her oral abs and d/john her Per pt, her symptoms did not resolve. She started having a non-productive cough yesterday and had a temp of 102. Pt  and mother tested positive for COVID 3 days ago. She took a home test today which was negative. She also endorses mild dysuria intermittently. Pt states that she has having low grade fever (99 to 100.1) for the past 2 months with left lower leg pain/numbness. She denies any chest pain, SOB, N/V, abdominal pain or dizziness.   pt has been coughing up blood for the past month. Pt believes it is coming from her nose. She can also blow her nose with clots. She went to an ENt who did not find anything   Pt's daughter had MTb when she was [age] and was treated for a year. The patient took care of her at the time  In the ED, pt had a Tmax of 100.8, HR of 110, BP of 154/73 and saturating well on RA. In the ED, pt given 500cc bolus and Vanc + cefepime (23 Jun 2022 12:19)  Pt was started on abs but continues to spike .Covid swab was negative  CXR with RUL infiltrate    A/P    #COVID 19  Likely etiology of recent high grade fevers  However, low grade fevers for weeks (which I doubt is secondary to COVID19)  --Maintain COVID19 Isolation Precautions  --Recommend Ferritin, CRP, D-Dimer every 48-72H  --Recommend Remdesivir x 5 days total - monitor BUN/Cr and LFT's- day # 3/5  --No indication for Dexamethasone at present    #Fever  --Given prolonged fevers prior to presentation reasonable to still exclude tuberculosis  --Maintain airborne isolation for now  -- cefepime d/john  --Follow up on Quantiferon Gold and Urine Histo Ag    #LLE NUMBNESS  Hematology/Oncology called to see patient who is well known to Dr. Munir Sam of [facility] for the management of IgG Lambda multiple myeloma. Skeletal survey showed two small lucencies in the femoral neck but PET/CT was normal and patient has normal renal function and minimal anemia. Her only complaint was peripheral neuropathy in her feet. However, secondary to rising kappa lambda FLC ratio, M protein and other high risk features, treatment was recommended (Dr. Sam recommending daratumumab, bortezomib lenalidomide and dexamethazone) that was tentatively scheduled to start on 7/5/2022. Patient was planning to get a second opinion at [facility] before starting treatment. When the patient was seen by Dr. Sam on 6/17, she was experiencing fevers to 102 and was referred to the ED.  ? was back imaged as well??      Dixie Mercedes M.D. ,   please reach via teams   If no answer, or after 5PM/ weekends,  then please call  [phone]    Assessment and plan discussed with the primary team .

## 2022-06-27 NOTE — PROGRESS NOTE ADULT - PROBLEM SELECTOR PLAN 2
sepsis likely 2/2 COVID PNA however will rule out TB as ID is skeptical all sxs related to covid as she has had sxs for the past month  -BCx NGTD, MRSA neg  -cefepime dc'd as sputum culture nonspecific, no other evidence of bacterial pneumonia    Plan:  - pending AFB 3x cultures, quantiferon, U histo Ag,

## 2022-06-27 NOTE — PROGRESS NOTE ADULT - SUBJECTIVE AND OBJECTIVE BOX
JORDY KADIEYOHAN  67y  Female      Patient is a 67y old  Female who presents with a chief complaint of fever and cough (26 Jun 2022 07:17)      INTERVAL HPI/OVERNIGHT EVENTS:      REVIEW OF SYSTEMS:  CONSTITUTIONAL: No fever, weight loss, or fatigue  EYES: No eye pain, visual disturbances, or discharge  ENMT:  No difficulty hearing, tinnitus, vertigo; No sinus or throat pain  NECK: No pain or stiffness  BREASTS: No pain, masses, or nipple discharge  RESPIRATORY: No cough, wheezing, chills or hemoptysis; No shortness of breath  CARDIOVASCULAR: No chest pain, palpitations, dizziness, or leg swelling  GASTROINTESTINAL: No abdominal or epigastric pain. No nausea, vomiting, or hematemesis; No diarrhea or constipation. No melena or hematochezia.  GENITOURINARY: No dysuria, frequency, hematuria, or incontinence  NEUROLOGICAL: No headaches, memory loss, loss of strength, numbness, or tremors  SKIN: No itching, burning, rashes, or lesions   LYMPH NODES: No enlarged glands  ENDOCRINE: No heat or cold intolerance; No hair loss  MUSCULOSKELETAL: No joint pain or swelling; No muscle, back, or extremity pain  PSYCHIATRIC: No depression, anxiety, mood swings, or difficulty sleeping  HEME/LYMPH: No easy bruising, or bleeding gums  ALLERY AND IMMUNOLOGIC: No hives or eczema  FAMILY HISTORY:    T(C): 37.9 (06-27-22 @ 06:34), Max: 38.1 (06-27-22 @ 06:02)  HR: 86 (06-27-22 @ 06:02) (82 - 89)  BP: 149/65 (06-27-22 @ 06:02) (126/56 - 149/65)  RR: 18 (06-27-22 @ 06:02) (18 - 18)  SpO2: 95% (06-27-22 @ 06:02) (95% - 97%)  Wt(kg): --Vital Signs Last 24 Hrs  T(C): 37.9 (27 Jun 2022 06:34), Max: 38.1 (27 Jun 2022 06:02)  T(F): 100.2 (27 Jun 2022 06:34), Max: 100.5 (27 Jun 2022 06:02)  HR: 86 (27 Jun 2022 06:02) (82 - 89)  BP: 149/65 (27 Jun 2022 06:02) (126/56 - 149/65)  BP(mean): --  RR: 18 (27 Jun 2022 06:02) (18 - 18)  SpO2: 95% (27 Jun 2022 06:02) (95% - 97%)  No Known Allergies      PHYSICAL EXAM:  GENERAL: NAD, well-groomed, well-developed  HEAD:  Atraumatic, Normocephalic  EYES: EOMI, PERRLA, conjunctiva and sclera clear  ENMT: No tonsillar erythema, exudates, or enlargement; Moist mucous membranes, Good dentition, No lesions  NECK: Supple, No JVD, Normal thyroid  NERVOUS SYSTEM:  Alert & Oriented X3, Good concentration; Motor Strength 5/5 B/L upper and lower extremities; DTRs 2+ intact and symmetric  CHEST/LUNG: Clear to percussion bilaterally; No rales, rhonchi, wheezing, or rubs  HEART: Regular rate and rhythm; No murmurs, rubs, or gallops  ABDOMEN: Soft, Nontender, Nondistended; Bowel sounds present  EXTREMITIES:  2+ Peripheral Pulses, No clubbing, cyanosis, or edema  LYMPH: No lymphadenopathy noted  SKIN: No rashes or lesions    Consultant(s) Notes Reviewed:  [x ] YES  [ ] NO  Care Discussed with Consultants/Other Providers [ x] YES  [ ] NO    LABS:      RADIOLOGY & ADDITIONAL TESTS:    Imaging Personally Reviewed:  [ ] YES  [ ] NO  acetaminophen     Tablet .. 650 milliGRAM(s) Oral every 6 hours PRN  benzonatate 100 milliGRAM(s) Oral every 8 hours  chlorhexidine 2% Cloths 1 Application(s) Topical <User Schedule>  enoxaparin Injectable 40 milliGRAM(s) SubCutaneous every 24 hours  escitalopram 5 milliGRAM(s) Oral daily  gabapentin 200 milliGRAM(s) Oral two times a day  guaiFENesin Oral Liquid (Sugar-Free) 100 milliGRAM(s) Oral every 6 hours PRN  hydrocodone/homatropine Syrup 5 milliLiter(s) Oral every 6 hours PRN  remdesivir  IVPB   IV Intermittent   remdesivir  IVPB 100 milliGRAM(s) IV Intermittent every 24 hours  sodium chloride 3%  Inhalation 4 milliLiter(s) Inhalation every 12 hours      HEALTH ISSUES - PROBLEM Dx:  Sepsis    HTN (hypertension)    Multiple myeloma    Prophylactic measure    Left leg pain    Pneumonia due to COVID-19 virus           Provider: Tristen Loza, PGY1 Internal Medicine   Contact via TEAMS   Emergency Pager: [phone]      MILLIE SPENCE  67y  Female      Patient is a 67y old  Female who presents with a chief complaint of fever and cough (27 Jun 2022 10:40)      INTERVAL HPI/OVERNIGHT EVENTS: Patient seen and examined at bedside this AM. States slept poorly overnight. Tolerating feeding. Cough has been disrupting her daily life. Currently on Remdesevir for covid pna. States breathing well, chest pain related to coughing, denies changes to BMs or urination. ROS otherwise stated below      REVIEW OF SYSTEMS:  CONSTITUTIONAL: +Fever  EYES:   ENMT: +nasal congestion    NECK:   BREASTS:   RESPIRATORY: +cough, wheezing, chills; No shortness of breath  CARDIOVASCULAR: + non-cardiac chest pain, No palpitations or leg swelling  GASTROINTESTINAL: No abdominal or epigastric pain. +nausea/vomiting no hematemesis; No diarrhea or constipation  GENITOURINARY: No dysuria, frequency, hematuria, or incontinence  NEUROLOGICAL: No headaches  SKIN:   LYMPH NODES:   ENDOCRINE:   MUSCULOSKELETAL: +joint pain or swelling; No muscle, back, or + LLE extremity pain  PSYCHIATRIC: +Difficulty sleeping  HEME/LYMPH:   ALLERY AND IMMUNOLOGIC:   FAMILY HISTORY:    T(C): 37.1 (06-27-22 @ 11:41), Max: 38.1 (06-27-22 @ 06:02)  HR: 77 (06-27-22 @ 11:41) (77 - 89)  BP: 120/66 (06-27-22 @ 11:41) (120/66 - 149/65)  RR: 18 (06-27-22 @ 11:41) (18 - 18)  SpO2: 94% (06-27-22 @ 11:41) (94% - 99%)  Wt(kg): --Vital Signs Last 24 Hrs  T(C): 37.1 (27 Jun 2022 11:41), Max: 38.1 (27 Jun 2022 06:02)  T(F): 98.7 (27 Jun 2022 11:41), Max: 100.5 (27 Jun 2022 06:02)  HR: 77 (27 Jun 2022 11:41) (77 - 89)  BP: 120/66 (27 Jun 2022 11:41) (120/66 - 149/65)  BP(mean): --  RR: 18 (27 Jun 2022 11:41) (18 - 18)  SpO2: 94% (27 Jun 2022 11:41) (94% - 99%)  No Known Allergies      PHYSICAL EXAM:  GENERAL: NAD, well-groomed, well-developed, seen sleeping in bed   HEAD:  Atraumatic, Normocephalic  EYES: EOMI, PERRLA, conjunctiva and sclera clear  ENMT: Moist mucous membranes, Good dentition, No lesions  NECK: Supple, No JVD,   NERVOUS SYSTEM:  Alert & Oriented X3, Good concentration; Motor Strength 5/5 B/L upper and lower extremities  CHEST/LUNG: Clear to percussion bilaterally; No rales, rhonchi, wheezing, or rubs  HEART: Regular rate and rhythm; No murmurs, rubs, or gallops  ABDOMEN: Soft, Nontender, Nondistended; Bowel sounds present  EXTREMITIES:  2+ Peripheral Pulses, No clubbing, cyanosis, or edema  LYMPH:  SKIN:    Consultant(s) Notes Reviewed:  [x ] YES  [ ] NO  Care Discussed with Consultants/Other Providers [ x] YES  [ ] NO    LABS:      RADIOLOGY & ADDITIONAL TESTS:    Imaging Personally Reviewed:  [ ] YES  [ ] NO  acetaminophen     Tablet .. 650 milliGRAM(s) Oral every 6 hours PRN  benzonatate 100 milliGRAM(s) Oral every 8 hours  chlorhexidine 2% Cloths 1 Application(s) Topical <User Schedule>  enoxaparin Injectable 40 milliGRAM(s) SubCutaneous every 24 hours  escitalopram 5 milliGRAM(s) Oral daily  gabapentin 200 milliGRAM(s) Oral two times a day  guaiFENesin Oral Liquid (Sugar-Free) 100 milliGRAM(s) Oral every 6 hours PRN  hydrocodone/homatropine Syrup 5 milliLiter(s) Oral every 6 hours PRN  remdesivir  IVPB   IV Intermittent   remdesivir  IVPB 100 milliGRAM(s) IV Intermittent every 24 hours  sodium chloride 3%  Inhalation 4 milliLiter(s) Inhalation every 12 hours      HEALTH ISSUES - PROBLEM Dx:  Sepsis    HTN (hypertension)    Multiple myeloma    Prophylactic measure    Left leg pain    Pneumonia due to COVID-19 virus

## 2022-06-27 NOTE — PROGRESS NOTE ADULT - PROBLEM SELECTOR PLAN 1
- Tested positive 6/24;  positive 2 weeks ago  - fully vaccinated, boosted  - elevated d-dimer, ferritin, CRP consistent with covid  - CT chest 6/24 consistent with covid pna  - not a candidate for monoclonal antibiodies per EUA since she presented with covid    Plan:  - started remdisivir x5d (6/25 - )  -monitor ferritin, d-dimer, CRP q48-72  - ID following, pending recs  -symptomatic tx of cough with tessalon perles, robitussin, hycodan for severe cough - Tested positive 6/24;  positive 2 weeks ago; daughter currently positive  - fully vaccinated, boosted  - elevated d-dimer, ferritin, CRP consistent with covid  - CT chest 6/24 consistent with covid pna  - not a candidate for monoclonal antibiodies per EUA since she presented with covid    Plan:  - started remdisivir x5d (6/25 - )  -monitor ferritin, d-dimer, CRP q48-72  - ID following, pending recs  -symptomatic tx of cough with tessalon perles robitussin, hycodan for severe cough

## 2022-06-27 NOTE — PROGRESS NOTE ADULT - SUBJECTIVE AND OBJECTIVE BOX
Patient is a 67y old  Female who presents with a chief complaint of fever and cough (27 Jun 2022 10:40)    Being followed by ID for        Interval history:  pt feels a little better  less cough  temps are less  still with nose bleeds   No other acute events        PAST MEDICAL & SURGICAL HISTORY:  HTN (hypertension)      HLD (hyperlipidemia)      Multiple myeloma      No significant past surgical history        Allergies    No Known Allergies    Intolerances      Antimicrobials:    remdesivir  IVPB   IV Intermittent   remdesivir  IVPB 100 milliGRAM(s) IV Intermittent every 24 hours    MEDICATIONS  (STANDING):  benzonatate 100 milliGRAM(s) Oral every 8 hours  chlorhexidine 2% Cloths 1 Application(s) Topical <User Schedule>  enoxaparin Injectable 40 milliGRAM(s) SubCutaneous every 24 hours  escitalopram 5 milliGRAM(s) Oral daily  gabapentin 200 milliGRAM(s) Oral two times a day  remdesivir  IVPB   IV Intermittent   remdesivir  IVPB 100 milliGRAM(s) IV Intermittent every 24 hours  sodium chloride 3%  Inhalation 4 milliLiter(s) Inhalation every 12 hours      Vital Signs Last 24 Hrs  T(C): 37.1 (06-27-22 @ 11:41), Max: 38.1 (06-27-22 @ 06:02)  T(F): 98.7 (06-27-22 @ 11:41), Max: 100.5 (06-27-22 @ 06:02)  HR: 77 (06-27-22 @ 11:41) (77 - 89)  BP: 120/66 (06-27-22 @ 11:41) (120/66 - 149/65)  BP(mean): --  RR: 18 (06-27-22 @ 11:41) (18 - 18)  SpO2: 94% (06-27-22 @ 11:41) (94% - 99%)    Physical Exam:    Constitutional well preserved,comfortable,pleasant    HEENT PERRLA EOMI,No pallor or icterus    No oral exudate or erythema    Neck supple no JVD or LN    Chest Good AE,CTA    CVS RRR S1 S2     Abd soft BS normal No tenderness     Ext No cyanosis clubbing or edema    IV site no erythema tenderness or discharge    Joints no swelling or LOM    CNS AAO X 3 no focal    Lab Data:                          9.8    10.21 )-----------( 299      ( 27 Jun 2022 07:24 )             30.3       06-27    133<L>  |  98  |  11  ----------------------------<  102<H>  3.9   |  25  |  0.55    Ca    8.3<L>      27 Jun 2022 07:24  Phos  3.3     06-27  Mg     2.1     06-27    TPro  9.0<H>  /  Alb  2.7<L>  /  TBili  0.5  /  DBili  <0.1  /  AST  25  /  ALT  18  /  AlkPhos  73  06-27          .Sputum Sputum  06-25-22 --  --  --      .Sputum Sputum  06-24-22   Normal Respiratory Sahra present  --    Few polymorphonuclear leukocytes per low power field  Numerous Squamous epithelial cells per low power field  Numerous Gram Positive Cocci in Clusters seen per oil power field  Numerous Gram Variable Rods seen per oil power field      Clean Catch Clean Catch (Midstream)  06-23-22   <10,000 CFU/mL Normal Urogenital Sahra  --  --      .Blood Blood-Venous  06-22-22   No growth to date.  --  --      .Blood Blood-Venous  06-22-22   No growth to date.  --  --                    WBC Count: 10.21 (06-27-22 @ 07:24)  WBC Count: 8.78 (06-26-22 @ 07:09)  WBC Count: 9.66 (06-25-22 @ 07:34)  WBC Count: 8.94 (06-24-22 @ 07:14)  WBC Count: 8.53 (06-22-22 @ 21:33)             Patient is a 67y old  Female who presents with a chief complaint of fever and cough (27 Jun 2022 10:40)    Being followed by ID for Covid-19        Interval history:  pt feels a little better  less cough  temps are less  still with nose bleeds   on remdesivir for Coivd-19  No other acute events        PAST MEDICAL & SURGICAL HISTORY:  HTN (hypertension)      HLD (hyperlipidemia)      Multiple myeloma      No significant past surgical history        Allergies    No Known Allergies    Intolerances      Antimicrobials:    remdesivir  IVPB   IV Intermittent   remdesivir  IVPB 100 milliGRAM(s) IV Intermittent every 24 hours    MEDICATIONS  (STANDING):  benzonatate 100 milliGRAM(s) Oral every 8 hours  chlorhexidine 2% Cloths 1 Application(s) Topical <User Schedule>  enoxaparin Injectable 40 milliGRAM(s) SubCutaneous every 24 hours  escitalopram 5 milliGRAM(s) Oral daily  gabapentin 200 milliGRAM(s) Oral two times a day  remdesivir  IVPB   IV Intermittent   remdesivir  IVPB 100 milliGRAM(s) IV Intermittent every 24 hours  sodium chloride 3%  Inhalation 4 milliLiter(s) Inhalation every 12 hours      Vital Signs Last 24 Hrs  T(C): 37.1 (06-27-22 @ 11:41), Max: 38.1 (06-27-22 @ 06:02)  T(F): 98.7 (06-27-22 @ 11:41), Max: 100.5 (06-27-22 @ 06:02)  HR: 77 (06-27-22 @ 11:41) (77 - 89)  BP: 120/66 (06-27-22 @ 11:41) (120/66 - 149/65)  BP(mean): --  RR: 18 (06-27-22 @ 11:41) (18 - 18)  SpO2: 94% (06-27-22 @ 11:41) (94% - 99%)    Physical Exam:    Constitutional well preserved, comfortable, pleasant    HEENT PERRLA EOMI,No pallor or icterus    No oral exudate or erythema    Neck supple no JVD or LN    Chest Good AE,CTA    CVS RRR S1 S2     Abd soft BS normal No tenderness     Ext No cyanosis clubbing or edema    IV site no erythema tenderness or discharge    Joints no swelling or LOM    CNS AAO X 3 no focal    Lab Data:                          9.8    10.21 )-----------( 299      ( 27 Jun 2022 07:24 )             30.3       06-27    133<L>  |  98  |  11  ----------------------------<  102<H>  3.9   |  25  |  0.55    Ca    8.3<L>      27 Jun 2022 07:24  Phos  3.3     06-27  Mg     2.1     06-27    TPro  9.0<H>  /  Alb  2.7<L>  /  TBili  0.5  /  DBili  <0.1  /  AST  25  /  ALT  18  /  AlkPhos  73  06-27          .Sputum Sputum  06-25-22 --  --  --  Culture - Acid Fast - Sputum w/Smear . (06.25.22 @ 09:37)    Acid Fast Bacilli Smear:   No acid fast bacilli seen by fluorochrome stain        .Sputum Sputum  06-24-22   Normal Respiratory Sahra present  --    Few polymorphonuclear leukocytes per low power field  Numerous Squamous epithelial cells per low power field  Numerous Gram Positive Cocci in Clusters seen per oil power field  Numerous Gram Variable Rods seen per oil power field      Clean Catch Clean Catch (Midstream)  06-23-22   <10,000 CFU/mL Normal Urogenital Sahra  --  --    .Blood Blood-Venous  06-22-22   No growth to date.  --  --    .Blood Blood-Venous  06-22-22   No growth to date.  --  --    D-Dimer Assay, Quantitative (06.26.22 @ 07:09)    D-Dimer Assay, Quantitative: 392: “D-Dimer result less than or equal to 229ng/mL DDU correlates with the  absence of thrombosis in a patient with a low and moderate pre-test  probability of thrombosis. 1DDU is approximately equal to 2ng/mL FEU  (previous unit)” ng/mL DDU    Ferritin, Serum in AM (06.26.22 @ 07:09)    Ferritin, Serum: 399 ng/mL    C-Reactive Protein, Serum (06.26.22 @ 07:09)    C-Reactive Protein, Serum: 104 mg/L    WBC Count: 10.21 (06-27-22 @ 07:24)  WBC Count: 8.78 (06-26-22 @ 07:09)  WBC Count: 9.66 (06-25-22 @ 07:34)  WBC Count: 8.94 (06-24-22 @ 07:14)  WBC Count: 8.53 (06-22-22 @ 21:33)

## 2022-06-27 NOTE — PROGRESS NOTE ADULT - ASSESSMENT
66 yo female with PMHx of recently diagnosed multiple myeloma (not on chemo), HTN p/w fever with T.max of 102 and non productive cough. Likely COVID pneumonia, however will rule out TB.  66 yo female with PMHx of recently diagnosed multiple myeloma (not on chemo), HTN p/w fever with T.max of 102 and non productive cough likely 2/2 COVID pneumonia tx w/ remdesevir. AFB for TB negative x1 pending 2 more smears.

## 2022-06-28 DIAGNOSIS — R04.0 EPISTAXIS: ICD-10-CM

## 2022-06-28 LAB
ALBUMIN SERPL ELPH-MCNC: 2.5 G/DL — LOW (ref 3.3–5)
ALBUMIN SERPL ELPH-MCNC: 2.6 G/DL — LOW (ref 3.3–5)
ALP SERPL-CCNC: 69 U/L — SIGNIFICANT CHANGE UP (ref 40–120)
ALP SERPL-CCNC: 69 U/L — SIGNIFICANT CHANGE UP (ref 40–120)
ALT FLD-CCNC: 17 U/L — SIGNIFICANT CHANGE UP (ref 10–45)
ALT FLD-CCNC: 19 U/L — SIGNIFICANT CHANGE UP (ref 10–45)
ANION GAP SERPL CALC-SCNC: 7 MMOL/L — SIGNIFICANT CHANGE UP (ref 5–17)
AST SERPL-CCNC: 22 U/L — SIGNIFICANT CHANGE UP (ref 10–40)
AST SERPL-CCNC: 23 U/L — SIGNIFICANT CHANGE UP (ref 10–40)
BILIRUB DIRECT SERPL-MCNC: <0.1 MG/DL — SIGNIFICANT CHANGE UP (ref 0–0.3)
BILIRUB INDIRECT FLD-MCNC: >0.2 MG/DL — SIGNIFICANT CHANGE UP (ref 0.2–1)
BILIRUB SERPL-MCNC: 0.3 MG/DL — SIGNIFICANT CHANGE UP (ref 0.2–1.2)
BILIRUB SERPL-MCNC: 0.3 MG/DL — SIGNIFICANT CHANGE UP (ref 0.2–1.2)
BUN SERPL-MCNC: 10 MG/DL — SIGNIFICANT CHANGE UP (ref 7–23)
CALCIUM SERPL-MCNC: 8.1 MG/DL — LOW (ref 8.4–10.5)
CHLORIDE SERPL-SCNC: 98 MMOL/L — SIGNIFICANT CHANGE UP (ref 96–108)
CO2 SERPL-SCNC: 26 MMOL/L — SIGNIFICANT CHANGE UP (ref 22–31)
CREAT SERPL-MCNC: 0.51 MG/DL — SIGNIFICANT CHANGE UP (ref 0.5–1.3)
CREAT SERPL-MCNC: 0.55 MG/DL — SIGNIFICANT CHANGE UP (ref 0.5–1.3)
CRP SERPL-MCNC: 76 MG/L — HIGH (ref 0–4)
CULTURE RESULTS: SIGNIFICANT CHANGE UP
CULTURE RESULTS: SIGNIFICANT CHANGE UP
EGFR: 101 ML/MIN/1.73M2 — SIGNIFICANT CHANGE UP
EGFR: 103 ML/MIN/1.73M2 — SIGNIFICANT CHANGE UP
FERRITIN SERPL-MCNC: 462 NG/ML — HIGH (ref 15–150)
GAMMA INTERFERON BACKGROUND BLD IA-ACNC: 0.05 IU/ML — SIGNIFICANT CHANGE UP
GLUCOSE SERPL-MCNC: 153 MG/DL — HIGH (ref 70–99)
H CAPSUL AG SPEC-ACNC: SIGNIFICANT CHANGE UP
H CAPSUL AG UR IA-ACNC: SIGNIFICANT CHANGE UP NG/ML
H CAPSUL AG UR QL IA: SIGNIFICANT CHANGE UP
HCT VFR BLD CALC: 29.1 % — LOW (ref 34.5–45)
HGB BLD-MCNC: 9.4 G/DL — LOW (ref 11.5–15.5)
M TB IFN-G BLD-IMP: NEGATIVE — SIGNIFICANT CHANGE UP
M TB IFN-G CD4+ BCKGRND COR BLD-ACNC: 0.04 IU/ML — SIGNIFICANT CHANGE UP
M TB IFN-G CD4+CD8+ BCKGRND COR BLD-ACNC: 0.02 IU/ML — SIGNIFICANT CHANGE UP
MAGNESIUM SERPL-MCNC: 2.1 MG/DL — SIGNIFICANT CHANGE UP (ref 1.6–2.6)
MCHC RBC-ENTMCNC: 28.5 PG — SIGNIFICANT CHANGE UP (ref 27–34)
MCHC RBC-ENTMCNC: 32.3 GM/DL — SIGNIFICANT CHANGE UP (ref 32–36)
MCV RBC AUTO: 88.2 FL — SIGNIFICANT CHANGE UP (ref 80–100)
NIGHT BLUE STAIN TISS: SIGNIFICANT CHANGE UP
NIGHT BLUE STAIN TISS: SIGNIFICANT CHANGE UP
NRBC # BLD: 0 /100 WBCS — SIGNIFICANT CHANGE UP (ref 0–0)
PHOSPHATE SERPL-MCNC: 2.8 MG/DL — SIGNIFICANT CHANGE UP (ref 2.5–4.5)
PLATELET # BLD AUTO: 300 K/UL — SIGNIFICANT CHANGE UP (ref 150–400)
POTASSIUM SERPL-MCNC: 3.9 MMOL/L — SIGNIFICANT CHANGE UP (ref 3.5–5.3)
POTASSIUM SERPL-SCNC: 3.9 MMOL/L — SIGNIFICANT CHANGE UP (ref 3.5–5.3)
PROT SERPL-MCNC: 8.3 G/DL — SIGNIFICANT CHANGE UP (ref 6–8.3)
PROT SERPL-MCNC: 8.4 G/DL — HIGH (ref 6–8.3)
QUANT TB PLUS MITOGEN MINUS NIL: >10 IU/ML — SIGNIFICANT CHANGE UP
RBC # BLD: 3.3 M/UL — LOW (ref 3.8–5.2)
RBC # FLD: 13 % — SIGNIFICANT CHANGE UP (ref 10.3–14.5)
SODIUM SERPL-SCNC: 131 MMOL/L — LOW (ref 135–145)
SPECIMEN SOURCE: SIGNIFICANT CHANGE UP
WBC # BLD: 6.26 K/UL — SIGNIFICANT CHANGE UP (ref 3.8–10.5)
WBC # FLD AUTO: 6.26 K/UL — SIGNIFICANT CHANGE UP (ref 3.8–10.5)

## 2022-06-28 PROCEDURE — 30901 CONTROL OF NOSEBLEED: CPT | Mod: RT

## 2022-06-28 PROCEDURE — 99232 SBSQ HOSP IP/OBS MODERATE 35: CPT

## 2022-06-28 PROCEDURE — 99222 1ST HOSP IP/OBS MODERATE 55: CPT | Mod: 25

## 2022-06-28 PROCEDURE — 99233 SBSQ HOSP IP/OBS HIGH 50: CPT | Mod: GC

## 2022-06-28 RX ADMIN — ESCITALOPRAM OXALATE 5 MILLIGRAM(S): 10 TABLET, FILM COATED ORAL at 12:30

## 2022-06-28 RX ADMIN — REMDESIVIR 500 MILLIGRAM(S): 5 INJECTION INTRAVENOUS at 18:05

## 2022-06-28 RX ADMIN — SODIUM CHLORIDE 4 MILLILITER(S): 9 INJECTION INTRAMUSCULAR; INTRAVENOUS; SUBCUTANEOUS at 06:25

## 2022-06-28 RX ADMIN — CHLORHEXIDINE GLUCONATE 1 APPLICATION(S): 213 SOLUTION TOPICAL at 06:26

## 2022-06-28 RX ADMIN — Medication 100 MILLIGRAM(S): at 06:25

## 2022-06-28 RX ADMIN — ENOXAPARIN SODIUM 40 MILLIGRAM(S): 100 INJECTION SUBCUTANEOUS at 18:05

## 2022-06-28 RX ADMIN — Medication 100 MILLIGRAM(S): at 21:55

## 2022-06-28 RX ADMIN — GABAPENTIN 200 MILLIGRAM(S): 400 CAPSULE ORAL at 18:04

## 2022-06-28 RX ADMIN — GABAPENTIN 200 MILLIGRAM(S): 400 CAPSULE ORAL at 06:25

## 2022-06-28 NOTE — PROGRESS NOTE ADULT - SUBJECTIVE AND OBJECTIVE BOX
Patient is a 67y old  Female who presents with a chief complaint of fever and cough (28 Jun 2022 06:48)    Being followed by ID for        Interval history:  No other acute events      ROS:  No cough,SOB,CP  No N/V/D  No abd pain  No urinary complaints  No HA  No joint or limb pain  No other complaints    PAST MEDICAL & SURGICAL HISTORY:  HTN (hypertension)      HLD (hyperlipidemia)      Multiple myeloma      No significant past surgical history        Allergies    No Known Allergies    Intolerances      Antimicrobials:    remdesivir  IVPB   IV Intermittent   remdesivir  IVPB 100 milliGRAM(s) IV Intermittent every 24 hours    MEDICATIONS  (STANDING):  benzonatate 100 milliGRAM(s) Oral every 8 hours  chlorhexidine 2% Cloths 1 Application(s) Topical <User Schedule>  enoxaparin Injectable 40 milliGRAM(s) SubCutaneous every 24 hours  escitalopram 5 milliGRAM(s) Oral daily  gabapentin 200 milliGRAM(s) Oral two times a day  remdesivir  IVPB   IV Intermittent   remdesivir  IVPB 100 milliGRAM(s) IV Intermittent every 24 hours  sodium chloride 3%  Inhalation 4 milliLiter(s) Inhalation every 12 hours      Vital Signs Last 24 Hrs  T(C): 37.6 (06-28-22 @ 12:17), Max: 37.6 (06-28-22 @ 12:17)  T(F): 99.6 (06-28-22 @ 12:17), Max: 99.6 (06-28-22 @ 12:17)  HR: 87 (06-28-22 @ 12:17) (76 - 89)  BP: 117/60 (06-28-22 @ 12:17) (117/60 - 155/68)  BP(mean): --  RR: 18 (06-28-22 @ 12:17) (18 - 18)  SpO2: 96% (06-28-22 @ 12:17) (95% - 96%)    Physical Exam:    Constitutional well preserved,comfortable,pleasant    HEENT PERRLA EOMI,No pallor or icterus    No oral exudate or erythema    Neck supple no JVD or LN    Chest Good AE,CTA    CVS RRR S1 S2 WNl No murmur or rub or gallop    Abd soft BS normal No tenderness no masses    Ext No cyanosis clubbing or edema    IV site no erythema tenderness or discharge    Joints no swelling or LOM    CNS AAO X 3 no focal    Lab Data:                          9.4    6.26  )-----------( 300      ( 28 Jun 2022 07:09 )             29.1       06-28    131<L>  |  98  |  10  ----------------------------<  153<H>  3.9   |  26  |  0.55    Ca    8.1<L>      28 Jun 2022 07:11  Phos  2.8     06-28  Mg     2.1     06-28    TPro  8.4<H>  /  Alb  2.6<L>  /  TBili  0.3  /  DBili  x   /  AST  23  /  ALT  19  /  AlkPhos  69  06-28          .Sputum Sputum  06-25-22 --  --  --      .Sputum Sputum  06-24-22   Normal Respiratory Sahra present  --    Few polymorphonuclear leukocytes per low power field  Numerous Squamous epithelial cells per low power field  Numerous Gram Positive Cocci in Clusters seen per oil power field  Numerous Gram Variable Rods seen per oil power field      Clean Catch Clean Catch (Midstream)  06-23-22   <10,000 CFU/mL Normal Urogenital Sahra  --  --      .Blood Blood-Venous  06-22-22   No Growth Final  --  --      .Blood Blood-Venous  06-22-22   No Growth Final  --  --                    WBC Count: 6.26 (06-28-22 @ 07:09)  WBC Count: 10.21 (06-27-22 @ 07:24)  WBC Count: 8.78 (06-26-22 @ 07:09)  WBC Count: 9.66 (06-25-22 @ 07:34)  WBC Count: 8.94 (06-24-22 @ 07:14)  WBC Count: 8.53 (06-22-22 @ 21:33)             Patient is a 67y old  Female who presents with a chief complaint of fever and cough (28 Jun 2022 06:48)    Being followed by ID for Covid-19        Interval history:  feeling a little better  still some cough  no diarrhea  bilateral LE numbness L > R  No other acute events        PAST MEDICAL & SURGICAL HISTORY:  HTN (hypertension)      HLD (hyperlipidemia)      Multiple myeloma      No significant past surgical history        Allergies    No Known Allergies    Intolerances      Antimicrobials:    remdesivir  IVPB   IV Intermittent   remdesivir  IVPB 100 milliGRAM(s) IV Intermittent every 24 hours    MEDICATIONS  (STANDING):  benzonatate 100 milliGRAM(s) Oral every 8 hours  chlorhexidine 2% Cloths 1 Application(s) Topical <User Schedule>  enoxaparin Injectable 40 milliGRAM(s) SubCutaneous every 24 hours  escitalopram 5 milliGRAM(s) Oral daily  gabapentin 200 milliGRAM(s) Oral two times a day  remdesivir  IVPB   IV Intermittent   remdesivir  IVPB 100 milliGRAM(s) IV Intermittent every 24 hours  sodium chloride 3%  Inhalation 4 milliLiter(s) Inhalation every 12 hours      Vital Signs Last 24 Hrs  T(C): 37.6 (06-28-22 @ 12:17), Max: 37.6 (06-28-22 @ 12:17)  T(F): 99.6 (06-28-22 @ 12:17), Max: 99.6 (06-28-22 @ 12:17)  HR: 87 (06-28-22 @ 12:17) (76 - 89)  BP: 117/60 (06-28-22 @ 12:17) (117/60 - 155/68)  BP(mean): --  RR: 18 (06-28-22 @ 12:17) (18 - 18)  SpO2: 96% (06-28-22 @ 12:17) (95% - 96%)    Physical Exam:    Constitutional well preserved,comfortable,pleasant    HEENT PERRLA EOMI,No pallor or icterus    No oral exudate or erythema    Neck supple no JVD or LN    Chest Good AE,CTA    CVS  S1 S2     Abd soft BS normal No tenderness     Ext No cyanosis clubbing or edema    IV site no erythema tenderness or discharge    Joints no swelling or LOM    CNS AAO X 3 no focal    Lab Data:                          9.4    6.26  )-----------( 300      ( 28 Jun 2022 07:09 )             29.1       06-28    131<L>  |  98  |  10  ----------------------------<  153<H>  3.9   |  26  |  0.55    Ca    8.1<L>      28 Jun 2022 07:11  Phos  2.8     06-28  Mg     2.1     06-28    TPro  8.4<H>  /  Alb  2.6<L>  /  TBili  0.3  /  DBili  x   /  AST  23  /  ALT  19  /  AlkPhos  69  06-28      .Sputum Sputum  06-25-22 --  --  --      .Sputum Sputum  06-24-22   Normal Respiratory Sahra present  --    Few polymorphonuclear leukocytes per low power field  Numerous Squamous epithelial cells per low power field  Numerous Gram Positive Cocci in Clusters seen per oil power field  Numerous Gram Variable Rods seen per oil power field      Clean Catch Clean Catch (Midstream)  06-23-22   <10,000 CFU/mL Normal Urogenital Sahra  --  --      .Blood Blood-Venous  06-22-22   No Growth Final  --  --      .Blood Blood-Venous  06-22-22   No Growth Final  --  --      Culture - Acid Fast - Sputum w/Smear . (06.27.22 @ 16:13)    Acid Fast Bacilli Smear:   No acid fast bacilli seen by fluorochrome stain      Culture - Acid Fast - Sputum w/Smear . (06.25.22 @ 09:37)    Acid Fast Bacilli Smear:   No acid fast bacilli seen by fluorochrome stain    Quantiferon Plus TB (06.25.22 @ 07:07)    Quantiferon TB Plus: NEGATIVE: NEGATIVE: M. tuberculosis infection is NOT likely. No interferon-gamma  response to M. tuberculosis antigens was detected  POSITIVE: M. tuberculosis infection is likely. Interferon-gamma response  to M. tuberculosis antigens was detected. False-positive results may  occur in patients with prior infection with M. marinum, M. szulgai, or M.  kansasii.  INDETERMINATE: Likelihood of M. tuberculosis infection cannot be  determined. Repeat testing on a new specimen is suggested.  The QuantiFERON-TB Gold Plus assay measures T-cell release of  interferon-gamma following stimulation by M. tuberculosis complex  antigens (ESAT-6 and CFP-10). The magnitude of the amount of measured  interferon-gamma cannot be correlated to stage or degree of infection,  level of immune responsiveness, or likelihood for progression to active  disease. Results should be used in conjunction with risk assessment,  radiography, and other medical and diagnostic evaluations.    Quantiferon TB Plus Nil: 0.05 IU/mL    Quantiferon TB Plus TB1 minus Nil: 0.04 IU/mL    Quantiferon TB Plus TB2 minus Nil: 0.02 IU/mL    Quant TB Plus Mitogen minus Nil: >10.00 IU/mL                WBC Count: 6.26 (06-28-22 @ 07:09)  WBC Count: 10.21 (06-27-22 @ 07:24)  WBC Count: 8.78 (06-26-22 @ 07:09)  WBC Count: 9.66 (06-25-22 @ 07:34)  WBC Count: 8.94 (06-24-22 @ 07:14)  WBC Count: 8.53 (06-22-22 @ 21:33)

## 2022-06-28 NOTE — PROGRESS NOTE ADULT - PROBLEM SELECTOR PLAN 5
-Being followed by Dr. Munir Sam of [facility]  -Treatment was tentatively scheduled to start 7/5, Pending second opinion at [facility] on 6/29/2022  -Heme onc following -Pt on losartan and amlodipine at home  -Will hold in the setting of sepsis, restart as able

## 2022-06-28 NOTE — PROGRESS NOTE ADULT - PROBLEM SELECTOR PLAN 2
sepsis likely 2/2 COVID PNA however will rule out TB as ID is skeptical all sxs related to covid as she has had sxs for the past month  -BCx NGTD, MRSA neg  -cefepime dc'd as sputum culture nonspecific, no other evidence of bacterial pneumonia    Plan:  - pending AFB 3x cultures, quantiferon, U histo Ag, Patient describing nasal clot formation with congestion.    - ENT consulted

## 2022-06-28 NOTE — CONSULT NOTE ADULT - ASSESSMENT
68 yo female with PMHx of recently diagnosed multiple myeloma (not on chemo), HTN p/w fever with T.max of 102 and non productive cough likely 2/2 COVID pneumonia tx w/ remdesevir. AFB for TB negative x2 pending final results. Pt. c/o blood clots from bilateral nares today with no active bleeding, denies hemoptysis. On physical exam, an area of excoriation  on the right septum was noted and cauterized. Bacitracin was placed and bleeding controlled.

## 2022-06-28 NOTE — CONSULT NOTE ADULT - SUBJECTIVE AND OBJECTIVE BOX
CC  Bilateral epistaxis    HPI:68 yo female with PMHx of recently diagnosed multiple myeloma (not on chemo), HTN p/w fever and cough. She was seen at [facility] for similar symptoms 2 weeks ago and was discharged with cefpodoxime and azithromycin. Per pt, her symptoms did not resolve. She started having a non-productive cough a few days ago  and had a temp of 102. Pt  and mother tested positive for COVID 3 days ago. She took a home test today which was negative. She also endorses mild dysuria intermittently. Pt states that she has having low grade fever (99 to 100.1) for the past 2 months with left lower leg pain/numbness. She denies any chest pain, SOB, N/V, abdominal pain or dizziness. Pt. states today that she notices blood clots from both nares, denies active bleeding or hemoptysis. H/H 9.4/29        PAST MEDICAL & SURGICAL HISTORY:  HTN (hypertension)      HLD (hyperlipidemia)      Multiple myeloma      No significant past surgical history        Allergies    No Known Allergies    Intolerances      MEDICATIONS  (STANDING):  benzonatate 100 milliGRAM(s) Oral every 8 hours  chlorhexidine 2% Cloths 1 Application(s) Topical <User Schedule>  enoxaparin Injectable 40 milliGRAM(s) SubCutaneous every 24 hours  escitalopram 5 milliGRAM(s) Oral daily  gabapentin 200 milliGRAM(s) Oral two times a day  remdesivir  IVPB   IV Intermittent   remdesivir  IVPB 100 milliGRAM(s) IV Intermittent every 24 hours  sodium chloride 3%  Inhalation 4 milliLiter(s) Inhalation every 12 hours    MEDICATIONS  (PRN):  acetaminophen     Tablet .. 650 milliGRAM(s) Oral every 6 hours PRN Temp greater or equal to 38C (100.4F), Mild Pain (1 - 3)  guaiFENesin Oral Liquid (Sugar-Free) 100 milliGRAM(s) Oral every 6 hours PRN Cough  hydrocodone/homatropine Syrup 5 milliLiter(s) Oral every 6 hours PRN Cough      Social History: Denies smoking, ETOH or use of illicit drugs      Family history: Denies pertinent history in first degree relatives    ROS:   ENT: all negative except as noted in HPI   CV: denies palpitations  Pulm: denies SOB, cough, hemoptysis  GI: denies change in apetite, indigestion, n/v  : denies pertinent urinary symptoms, urgency  Neuro: denies numbness/tingling, loss of sensation  Psych: denies anxiety  MS: denies muscle weakness, instability  Heme: denies easy bruising or bleeding  Endo: denies heat/cold intolerance, excessive sweating  Vascular: denies LE edema    Vital Signs Last 24 Hrs  T(C): 36.7 (28 Jun 2022 16:50), Max: 37.6 (28 Jun 2022 12:17)  T(F): 98 (28 Jun 2022 16:50), Max: 99.6 (28 Jun 2022 12:17)  HR: 88 (28 Jun 2022 16:50) (76 - 89)  BP: 145/67 (28 Jun 2022 16:50) (117/60 - 155/68)  BP(mean): --  RR: 18 (28 Jun 2022 16:50) (18 - 18)  SpO2: 97% (28 Jun 2022 16:50) (95% - 97%)                          9.4    6.26  )-----------( 300      ( 28 Jun 2022 07:09 )             29.1    06-28    131<L>  |  98  |  10  ----------------------------<  153<H>  3.9   |  26  |  0.55    Ca    8.1<L>      28 Jun 2022 07:11  Phos  2.8     06-28  Mg     2.1     06-28    TPro  8.4<H>  /  Alb  2.6<L>  /  TBili  0.3  /  DBili  x   /  AST  23  /  ALT  19  /  AlkPhos  69  06-28       PHYSICAL EXAM:  Gen: NAD  Skin: No rashes, bruises, or lesions  Head: Normocephalic, Atraumatic  Face: no edema, erythema, or fluctuance. Parotid glands soft without mass  Eyes: no scleral injection  Nose: Right: excoriation noted on septum, area cauterized and bacitracin placed            Left: no active bleeding noted, no discharge  Mouth: No Stridor / Drooling / Trismus.  Mucosa moist, tongue/uvula midline, oropharynx clear  Neck: Flat, supple, no lymphadenopathy, trachea midline, no masses  Lymphatic: No lymphadenopathy  Resp: breathing easily, no stridor  CV: no peripheral edema/cyanosis  GI: nondistended   Peripheral vascular: no JVD or edema  Neuro: facial nerve intact, no facial droop          IMAGING/ADDITIONAL STUDIES:

## 2022-06-28 NOTE — PROGRESS NOTE ADULT - PROBLEM SELECTOR PLAN 4
-Pt on losartan and amlodipine at home  -Will hold in the setting of sepsis, restart as able - Pt having chronic left leg pain and numbness 2/2 lumbar disc herniation  - Outpatient x-ray negative for fracture dislocation  - decr gabapentin back to 200mg BID per pt preference  - PT eval appreciated

## 2022-06-28 NOTE — PROGRESS NOTE ADULT - ASSESSMENT
68 yo female with PMHx of recently diagnosed multiple myeloma (not on chemo), HTN p/w fever with T.max of 102 and non productive cough likely 2/2 COVID pneumonia tx w/ remdesevir. AFB for TB negative x1 pending 2 more smears. 66 yo female with PMHx of recently diagnosed multiple myeloma (not on chemo), HTN p/w fever with T.max of 102 and non productive cough likely 2/2 COVID pneumonia tx w/ remdesevir. AFB for TB negative x2 pending 1 more smear.

## 2022-06-28 NOTE — PROGRESS NOTE ADULT - SUBJECTIVE AND OBJECTIVE BOX
INCOMPLETE NOTE    Tristen Loza | PGY1| Pager: [phone]  Interval Events:    REVIEW OF SYSTEMS:  CONSTITUTIONAL: No weakness, fevers or chills  EYES/ENT: No visual changes;  No vertigo or throat pain   NECK: No pain or stiffness  RESPIRATORY: No cough, wheezing, hemoptysis; No shortness of breath  CARDIOVASCULAR: No chest pain or palpitations  GASTROINTESTINAL: No abdominal or epigastric pain. No nausea, vomiting, or hematemesis; No diarrhea or constipation. No melena or hematochezia.  GENITOURINARY: No dysuria, frequency or hematuria  NEUROLOGICAL: No numbness or weakness  SKIN: No itching, burning, rashes, or lesions   All other review of systems is negative unless indicated above.    OBJECTIVE:  ICU Vital Signs Last 24 Hrs  T(C): 37.3 (28 Jun 2022 06:41), Max: 37.4 (27 Jun 2022 16:19)  T(F): 99.1 (28 Jun 2022 06:41), Max: 99.4 (27 Jun 2022 16:19)  HR: 89 (28 Jun 2022 06:41) (76 - 89)  BP: 127/68 (28 Jun 2022 06:41) (120/66 - 149/75)  BP(mean): --  ABP: --  ABP(mean): --  RR: 18 (28 Jun 2022 06:41) (18 - 18)  SpO2: 95% (28 Jun 2022 06:41) (94% - 99%)        06-26 @ 07:01 - 06-27 @ 07:00  --------------------------------------------------------  IN: 1200 mL / OUT: 0 mL / NET: 1200 mL    06-27 @ 07:01  -  06-28 @ 06:49  --------------------------------------------------------  IN: 1450 mL / OUT: 0 mL / NET: 1450 mL      CAPILLARY BLOOD GLUCOSE          PHYSICAL EXAM:  General: WN/WD NAD  Neurology: A&Ox3, nonfocal, PEREZ x 4  Eyes: PERRLA/ EOMI, Gross vision intact  ENT/Neck: Neck supple, trachea midline, No JVD, Gross hearing intact  Respiratory: CTA B/L, No wheezing, rales, rhonchi  CV: RRR, +S1/S2, -S3/S4, no murmurs, rubs or gallops  Abdominal: Soft, NT, ND +BS, No HSM  MSK: 5/5 strength UE/LE bilaterally  Extremities: No edema, 2+ peripheral pulses  Skin: No Rashes, Hematoma, Ecchymosis  Incisions:   Tubes:    HOSPITAL MEDICATIONS:  MEDICATIONS  (STANDING):  benzonatate 100 milliGRAM(s) Oral every 8 hours  chlorhexidine 2% Cloths 1 Application(s) Topical <User Schedule>  enoxaparin Injectable 40 milliGRAM(s) SubCutaneous every 24 hours  escitalopram 5 milliGRAM(s) Oral daily  gabapentin 200 milliGRAM(s) Oral two times a day  remdesivir  IVPB   IV Intermittent   remdesivir  IVPB 100 milliGRAM(s) IV Intermittent every 24 hours  sodium chloride 3%  Inhalation 4 milliLiter(s) Inhalation every 12 hours    MEDICATIONS  (PRN):  acetaminophen     Tablet .. 650 milliGRAM(s) Oral every 6 hours PRN Temp greater or equal to 38C (100.4F), Mild Pain (1 - 3)  guaiFENesin Oral Liquid (Sugar-Free) 100 milliGRAM(s) Oral every 6 hours PRN Cough  hydrocodone/homatropine Syrup 5 milliLiter(s) Oral every 6 hours PRN Cough      LABS:                        9.8    10.21 )-----------( 299      ( 27 Jun 2022 07:24 )             30.3     Hgb Trend: 9.8<--, 10.1<--, 9.2<--, 9.1<--, 10.6<--  06-27    133<L>  |  98  |  11  ----------------------------<  102<H>  3.9   |  25  |  0.55    Ca    8.3<L>      27 Jun 2022 07:24  Phos  3.3     06-27  Mg     2.1     06-27    TPro  9.0<H>  /  Alb  2.7<L>  /  TBili  0.5  /  DBili  <0.1  /  AST  25  /  ALT  18  /  AlkPhos  73  06-27    Creatinine Trend: 0.55<--, 0.56<--, 0.61<--, 0.63<--, 0.70<--, 0.70<--            MICROBIOLOGY:     Culture - Acid Fast - Sputum w/Smear (collected 25 Jun 2022 09:37)  Source: .Sputum Sputum       Tristen Millerporter | PGY1| Pager: [phone]  Interval Events: NAEON. PAtient received day 4 of 5 of remdesivir. Sleep interrupted by cough. Reporting chest pain related to cough.    REVIEW OF SYSTEMS:  CONSTITUTIONAL: No weakness  EYES/ENT: No visual changes  NECK: No pain   RESPIRATORY: +cough, No wheezing, hemoptysis; No shortness of breath  CARDIOVASCULAR: No chest pain or palpitations  GASTROINTESTINAL: No abdominal or epigastric pain. No nausea, vomiting, or hematemesis; No diarrhea or constipation.  GENITOURINARY: No dysuria, frequency or hematuria  NEUROLOGICAL: No numbness or weakness  SKIN: No itching, burning, rashes, or lesions   All other review of systems is negative unless indicated above.    OBJECTIVE:  ICU Vital Signs Last 24 Hrs  T(C): 37.3 (28 Jun 2022 06:41), Max: 37.4 (27 Jun 2022 16:19)  T(F): 99.1 (28 Jun 2022 06:41), Max: 99.4 (27 Jun 2022 16:19)  HR: 89 (28 Jun 2022 06:41) (76 - 89)  BP: 127/68 (28 Jun 2022 06:41) (120/66 - 149/75)  BP(mean): --  ABP: --  ABP(mean): --  RR: 18 (28 Jun 2022 06:41) (18 - 18)  SpO2: 95% (28 Jun 2022 06:41) (94% - 99%)        06-26 @ 07:01 - 06-27 @ 07:00  --------------------------------------------------------  IN: 1200 mL / OUT: 0 mL / NET: 1200 mL    06-27 @ 07:01  -  06-28 @ 06:49  --------------------------------------------------------  IN: 1450 mL / OUT: 0 mL / NET: 1450 mL      CAPILLARY BLOOD GLUCOSE          PHYSICAL EXAM:  General: WN/WD NAD, seen sleeping in bed   Neurology: A&Ox3, nonfocal, PEREZ x 4  Eyes: PERRLA/ EOMI, Gross vision intact  ENT/Neck: Neck supple, trachea midline, No JVD, Gross hearing intact  Respiratory: CTA B/L, No wheezing, rales, rhonchi  CV: RRR, +S1/S2, -S3/S4, no murmurs, rubs or gallops  Abdominal: Soft, NT, ND +BS, No HSM  MSK:   Extremities: No edema, 2+ peripheral pulses  Skin: No Rashes, Hematoma, Ecchymosis  Incisions:   Tubes:    HOSPITAL MEDICATIONS:  MEDICATIONS  (STANDING):  benzonatate 100 milliGRAM(s) Oral every 8 hours  chlorhexidine 2% Cloths 1 Application(s) Topical <User Schedule>  enoxaparin Injectable 40 milliGRAM(s) SubCutaneous every 24 hours  escitalopram 5 milliGRAM(s) Oral daily  gabapentin 200 milliGRAM(s) Oral two times a day  remdesivir  IVPB   IV Intermittent   remdesivir  IVPB 100 milliGRAM(s) IV Intermittent every 24 hours  sodium chloride 3%  Inhalation 4 milliLiter(s) Inhalation every 12 hours    MEDICATIONS  (PRN):  acetaminophen     Tablet .. 650 milliGRAM(s) Oral every 6 hours PRN Temp greater or equal to 38C (100.4F), Mild Pain (1 - 3)  guaiFENesin Oral Liquid (Sugar-Free) 100 milliGRAM(s) Oral every 6 hours PRN Cough  hydrocodone/homatropine Syrup 5 milliLiter(s) Oral every 6 hours PRN Cough      LABS:                        9.8    10.21 )-----------( 299      ( 27 Jun 2022 07:24 )             30.3     Hgb Trend: 9.8<--, 10.1<--, 9.2<--, 9.1<--, 10.6<--  06-27    133<L>  |  98  |  11  ----------------------------<  102<H>  3.9   |  25  |  0.55    Ca    8.3<L>      27 Jun 2022 07:24  Phos  3.3     06-27  Mg     2.1     06-27    TPro  9.0<H>  /  Alb  2.7<L>  /  TBili  0.5  /  DBili  <0.1  /  AST  25  /  ALT  18  /  AlkPhos  73  06-27    Creatinine Trend: 0.55<--, 0.56<--, 0.61<--, 0.63<--, 0.70<--, 0.70<--            MICROBIOLOGY:     Culture - Acid Fast - Sputum w/Smear (collected 25 Jun 2022 09:37)  Source: .Sputum Sputum

## 2022-06-28 NOTE — PROGRESS NOTE ADULT - ASSESSMENT
66 yo female with PMHx of recently diagnosed multiple myeloma (not on chemo), HTN p/w fever and cough. She was seen at [facility] for similar symptoms 2 weeks ago and was discharged with cefpodoxime and azithromycin. According to arturo she presented with low grade fevers and leg sxs. She was diagnosed with multiple myeloma and the soctor said since you have this diagnosis and fever , you should go to the ER. The ER gave her oral abs and d/john her Per pt, her symptoms did not resolve. She started having a non-productive cough yesterday and had a temp of 102. Pt  and mother tested positive for COVID 3 days ago. She took a home test today which was negative. She also endorses mild dysuria intermittently. Pt states that she has having low grade fever (99 to 100.1) for the past 2 months with left lower leg pain/numbness. She denies any chest pain, SOB, N/V, abdominal pain or dizziness.   pt has been coughing up blood for the past month. Pt believes it is coming from her nose. She can also blow her nose with clots. She went to an ENt who did not find anything   Pt's daughter had MTb when she was [age] and was treated for a year. The patient took care of her at the time  In the ED, pt had a Tmax of 100.8, HR of 110, BP of 154/73 and saturating well on RA. In the ED, pt given 500cc bolus and Vanc + cefepime (23 Jun 2022 12:19)  Pt was started on abs but continues to spike .Covid swab was negative  CXR with RUL infiltrate    A/P    #COVID 19  Likely etiology of recent high grade fevers  However, low grade fevers for weeks (which I doubt is secondary to COVID19)  --Maintain COVID19 Isolation Precautions  --Recommend Ferritin, CRP, D-Dimer every 48-72H  --Recommend Remdesivir x 5 days total - monitor BUN/Cr and LFT's- day # 3/5  --No indication for Dexamethasone at present    #Fever  --Given prolonged fevers prior to presentation reasonable to still exclude tuberculosis  --Maintain airborne isolation for now  -- cefepime d/john  --Quanteferon is negative! This doesn't r/o MTb but makes it less ikely  await 3rd sputum  await urine histo    #LLE NUMBNESS  Hematology/Oncology called to see patient who is well known to Dr. Munir Sam of [facility] for the management of IgG Lambda multiple myeloma. Skeletal survey showed two small lucencies in the femoral neck but PET/CT was normal and patient has normal renal function and minimal anemia. Her only complaint was peripheral neuropathy in her feet. However, secondary to rising kappa lambda FLC ratio, M protein and other high risk features, treatment was recommended (Dr. Sam recommending daratumumab, bortezomib lenalidomide and dexamethazone) that was tentatively scheduled to start on 7/5/2022. Patient was planning to get a second opinion at [facility] before starting treatment. When the patient was seen by Dr. Sam on 6/17, she was experiencing fevers to 102 and was referred to the ED.  ? was back imaged as well??    #Epistaxis  for ENT input      Dixie Mercedes M.D. ,   please reach via teams   If no answer, or after 5PM/ weekends,  then please call  [phone]    Assessment and plan discussed with the primary team .    I will be away tomorrow. My associates will be covering. Please call [phone] with acute issues, questions.

## 2022-06-28 NOTE — PROGRESS NOTE ADULT - PROBLEM SELECTOR PLAN 3
- Pt having chronic left leg pain and numbness 2/2 lumbar disc herniation  - Outpatient x-ray negative for fracture dislocation  - decr gabapentin back to 200mg BID per pt preference  - PT eval appreciated sepsis likely 2/2 COVID PNA however will rule out TB as ID is skeptical all sxs related to covid as she has had sxs for the past month  -BCx NGTD, MRSA neg  -cefepime dc'd as sputum culture nonspecific, no other evidence of bacterial pneumonia    Plan:  - pending AFB 3x cultures, quantiferon, U histo Ag,

## 2022-06-28 NOTE — PROGRESS NOTE ADULT - PROBLEM SELECTOR PLAN 1
- Tested positive 6/24;  positive 2 weeks ago; daughter currently positive  - fully vaccinated, boosted  - elevated d-dimer, ferritin, CRP consistent with covid  - CT chest 6/24 consistent with covid pna  - not a candidate for monoclonal antibiodies per EUA since she presented with covid    Plan:  - started remdisivir x5d (6/25 - )  -monitor ferritin, d-dimer, CRP q48-72  - ID following, pending recs  -symptomatic tx of cough with tessalon perles robitussin, hycodan for severe cough - Tested positive 6/24;  positive 2 weeks ago; daughter currently positive  - fully vaccinated, boosted  - elevated d-dimer, ferritin, CRP consistent with covid  - CT chest 6/24 consistent with covid pna  - not a candidate for monoclonal antibiodies per EUA since she presented with covid    Plan:  - started remdisivir x5d (6/25 -6/29 )  -symptomatic tx of cough with tessalon perles, robitussin, hycodan for severe cough

## 2022-06-28 NOTE — CONSULT NOTE ADULT - PROBLEM SELECTOR RECOMMENDATION 9
Strict blood pressure control  Nasal saline 2 sprays to bilateral nares QID  Bacitracin to nares BID  No nose rubbing, blowing, sneeze with open mouth and pinching nares  Avoid bending with head below the waist  No heavy lifting  Monitor H/H, Plat. PT/INR  Maintain active T&S  Transfusion support PRN

## 2022-06-28 NOTE — PROGRESS NOTE ADULT - PROBLEM SELECTOR PLAN 6
DVT ppx: Lovenox  Diet: regular    Dispo: pending clinical improvement -Being followed by Dr. Munir Sam of [facility]  -Treatment was tentatively scheduled to start 7/5, Pending second opinion at [facility] on 6/29/2022  -Heme onc following

## 2022-06-29 LAB
ALBUMIN SERPL ELPH-MCNC: 2.5 G/DL — LOW (ref 3.3–5)
ALP SERPL-CCNC: 71 U/L — SIGNIFICANT CHANGE UP (ref 40–120)
ALT FLD-CCNC: 18 U/L — SIGNIFICANT CHANGE UP (ref 10–45)
ANION GAP SERPL CALC-SCNC: 7 MMOL/L — SIGNIFICANT CHANGE UP (ref 5–17)
AST SERPL-CCNC: 27 U/L — SIGNIFICANT CHANGE UP (ref 10–40)
BILIRUB DIRECT SERPL-MCNC: <0.1 MG/DL — SIGNIFICANT CHANGE UP (ref 0–0.3)
BILIRUB INDIRECT FLD-MCNC: >0.5 MG/DL — SIGNIFICANT CHANGE UP (ref 0.2–1)
BILIRUB SERPL-MCNC: 0.6 MG/DL — SIGNIFICANT CHANGE UP (ref 0.2–1.2)
BUN SERPL-MCNC: 12 MG/DL — SIGNIFICANT CHANGE UP (ref 7–23)
CALCIUM SERPL-MCNC: 8.3 MG/DL — LOW (ref 8.4–10.5)
CHLORIDE SERPL-SCNC: 100 MMOL/L — SIGNIFICANT CHANGE UP (ref 96–108)
CO2 SERPL-SCNC: 26 MMOL/L — SIGNIFICANT CHANGE UP (ref 22–31)
CREAT SERPL-MCNC: 0.56 MG/DL — SIGNIFICANT CHANGE UP (ref 0.5–1.3)
CREAT SERPL-MCNC: 0.57 MG/DL — SIGNIFICANT CHANGE UP (ref 0.5–1.3)
EGFR: 100 ML/MIN/1.73M2 — SIGNIFICANT CHANGE UP
EGFR: 101 ML/MIN/1.73M2 — SIGNIFICANT CHANGE UP
GLUCOSE SERPL-MCNC: 93 MG/DL — SIGNIFICANT CHANGE UP (ref 70–99)
HCT VFR BLD CALC: 29.8 % — LOW (ref 34.5–45)
HGB BLD-MCNC: 9.5 G/DL — LOW (ref 11.5–15.5)
MAGNESIUM SERPL-MCNC: 2.1 MG/DL — SIGNIFICANT CHANGE UP (ref 1.6–2.6)
MCHC RBC-ENTMCNC: 28 PG — SIGNIFICANT CHANGE UP (ref 27–34)
MCHC RBC-ENTMCNC: 31.9 GM/DL — LOW (ref 32–36)
MCV RBC AUTO: 87.9 FL — SIGNIFICANT CHANGE UP (ref 80–100)
NRBC # BLD: 0 /100 WBCS — SIGNIFICANT CHANGE UP (ref 0–0)
PHOSPHATE SERPL-MCNC: 3.6 MG/DL — SIGNIFICANT CHANGE UP (ref 2.5–4.5)
PLATELET # BLD AUTO: 339 K/UL — SIGNIFICANT CHANGE UP (ref 150–400)
POTASSIUM SERPL-MCNC: 4 MMOL/L — SIGNIFICANT CHANGE UP (ref 3.5–5.3)
POTASSIUM SERPL-SCNC: 4 MMOL/L — SIGNIFICANT CHANGE UP (ref 3.5–5.3)
PROT SERPL-MCNC: 8.7 G/DL — HIGH (ref 6–8.3)
RBC # BLD: 3.39 M/UL — LOW (ref 3.8–5.2)
RBC # FLD: 13.3 % — SIGNIFICANT CHANGE UP (ref 10.3–14.5)
SODIUM SERPL-SCNC: 133 MMOL/L — LOW (ref 135–145)
WBC # BLD: 6.48 K/UL — SIGNIFICANT CHANGE UP (ref 3.8–10.5)
WBC # FLD AUTO: 6.48 K/UL — SIGNIFICANT CHANGE UP (ref 3.8–10.5)

## 2022-06-29 PROCEDURE — 99233 SBSQ HOSP IP/OBS HIGH 50: CPT | Mod: GC

## 2022-06-29 RX ORDER — BACITRACIN ZINC 500 UNIT/G
1 OINTMENT IN PACKET (EA) TOPICAL
Refills: 0 | Status: DISCONTINUED | OUTPATIENT
Start: 2022-06-29 | End: 2022-07-01

## 2022-06-29 RX ORDER — SODIUM CHLORIDE 0.65 %
2 AEROSOL, SPRAY (ML) NASAL
Refills: 0 | Status: DISCONTINUED | OUTPATIENT
Start: 2022-06-29 | End: 2022-07-01

## 2022-06-29 RX ADMIN — Medication 100 MILLIGRAM(S): at 13:09

## 2022-06-29 RX ADMIN — SODIUM CHLORIDE 4 MILLILITER(S): 9 INJECTION INTRAMUSCULAR; INTRAVENOUS; SUBCUTANEOUS at 06:23

## 2022-06-29 RX ADMIN — ENOXAPARIN SODIUM 40 MILLIGRAM(S): 100 INJECTION SUBCUTANEOUS at 18:24

## 2022-06-29 RX ADMIN — GABAPENTIN 200 MILLIGRAM(S): 400 CAPSULE ORAL at 18:26

## 2022-06-29 RX ADMIN — Medication 2 SPRAY(S): at 13:09

## 2022-06-29 RX ADMIN — Medication 1 APPLICATION(S): at 18:26

## 2022-06-29 RX ADMIN — REMDESIVIR 500 MILLIGRAM(S): 5 INJECTION INTRAVENOUS at 18:24

## 2022-06-29 RX ADMIN — Medication 100 MILLIGRAM(S): at 21:36

## 2022-06-29 RX ADMIN — Medication 100 MILLIGRAM(S): at 06:23

## 2022-06-29 RX ADMIN — CHLORHEXIDINE GLUCONATE 1 APPLICATION(S): 213 SOLUTION TOPICAL at 13:09

## 2022-06-29 RX ADMIN — GABAPENTIN 200 MILLIGRAM(S): 400 CAPSULE ORAL at 06:23

## 2022-06-29 RX ADMIN — ESCITALOPRAM OXALATE 5 MILLIGRAM(S): 10 TABLET, FILM COATED ORAL at 13:09

## 2022-06-29 RX ADMIN — Medication 2 SPRAY(S): at 18:25

## 2022-06-29 NOTE — PROGRESS NOTE ADULT - PROBLEM SELECTOR PLAN 2
Patient describing nasal clot formation with congestion.    - ENT consulted Patient describing nasal clot formation with congestion ENT consulted noticed Right septum excoriation now s/p cauterization    - ENT Recommendations:   Nasal saline 2 sprays to bilateral nares QID  Bacitracin to nares BID  No nose rubbing, blowing, sneeze with open mouth and pinching nares  Avoid bending with head below the waist  No heavy lifting  Monitor H/H, Plat. PT/INR  Maintain active T&S  Transfusion support PRN.

## 2022-06-29 NOTE — PROGRESS NOTE ADULT - PROBLEM SELECTOR PLAN 4
- Pt having chronic left leg pain and numbness 2/2 lumbar disc herniation  - Outpatient x-ray negative for fracture dislocation  - decr gabapentin back to 200mg BID per pt preference  - PT eval appreciated - Pt having chronic left leg pain and numbness 2/2 lumbar disc herniation  - Outpatient x-ray negative for fracture dislocation  - gabapentin 200mg BID per pt preference  - PT eval appreciated

## 2022-06-29 NOTE — PROGRESS NOTE ADULT - PROBLEM SELECTOR PLAN 3
sepsis likely 2/2 COVID PNA however will rule out TB as ID is skeptical all sxs related to covid as she has had sxs for the past month  -BCx NGTD, MRSA neg  -cefepime dc'd as sputum culture nonspecific, no other evidence of bacterial pneumonia    Plan:  - pending AFB 3x cultures, quantiferon, U histo Ag, sepsis and fevers likely 2/2 COVID PNA however she has had sxs for the past month with low-grade fevers  -BCx NGTD, MRSA neg  -cefepime dc'd as sputum culture nonspecific, no other evidence of bacterial pneumonia    Plan:  D/w ID the utility of CT Abdomen for r/o abdomenal abscess sepsis and fevers likely 2/2 COVID PNA however she has had sxs for the past month with low-grade fevers  -BCx NGTD, MRSA neg  -cefepime dc'd as sputum culture nonspecific, no other evidence of bacterial pneumonia    Plan:  D/w ID the utility of CT Abdomen for r/o abdomenal abscess- if febrile again will obtain abdomenal CT

## 2022-06-29 NOTE — PROGRESS NOTE ADULT - PROBLEM SELECTOR PLAN 6
-Being followed by Dr. Munir Sam of [facility]  -Treatment was tentatively scheduled to start 7/5, Pending second opinion at [facility] on 6/29/2022  -Heme onc following

## 2022-06-29 NOTE — PROGRESS NOTE ADULT - SUBJECTIVE AND OBJECTIVE BOX
INCOMPLETE NOTE    Tristen Loza | PGY1| Pager: [phone]  Interval Events:    REVIEW OF SYSTEMS:  CONSTITUTIONAL: No weakness, fevers or chills  EYES/ENT: No visual changes;  No vertigo or throat pain   NECK: No pain or stiffness  RESPIRATORY: No cough, wheezing, hemoptysis; No shortness of breath  CARDIOVASCULAR: No chest pain or palpitations  GASTROINTESTINAL: No abdominal or epigastric pain. No nausea, vomiting, or hematemesis; No diarrhea or constipation. No melena or hematochezia.  GENITOURINARY: No dysuria, frequency or hematuria  NEUROLOGICAL: No numbness or weakness  SKIN: No itching, burning, rashes, or lesions   All other review of systems is negative unless indicated above.    OBJECTIVE:  ICU Vital Signs Last 24 Hrs  T(C): 36.9 (29 Jun 2022 06:21), Max: 38.2 (28 Jun 2022 20:50)  T(F): 98.4 (29 Jun 2022 06:21), Max: 100.7 (28 Jun 2022 20:50)  HR: 76 (29 Jun 2022 06:21) (76 - 89)  BP: 131/66 (29 Jun 2022 06:21) (117/60 - 155/68)  BP(mean): --  ABP: --  ABP(mean): --  RR: 18 (29 Jun 2022 06:21) (18 - 18)  SpO2: 97% (29 Jun 2022 06:21) (95% - 97%)        06-27 @ 07:01 - 06-28 @ 07:00  --------------------------------------------------------  IN: 1450 mL / OUT: 0 mL / NET: 1450 mL    06-28 @ 07:01 - 06-29 @ 06:39  --------------------------------------------------------  IN: 720 mL / OUT: 0 mL / NET: 720 mL      CAPILLARY BLOOD GLUCOSE          PHYSICAL EXAM:  General: WN/WD NAD  Neurology: A&Ox3, nonfocal, PEREZ x 4  Eyes: PERRLA/ EOMI, Gross vision intact  ENT/Neck: Neck supple, trachea midline, No JVD, Gross hearing intact  Respiratory: CTA B/L, No wheezing, rales, rhonchi  CV: RRR, +S1/S2, -S3/S4, no murmurs, rubs or gallops  Abdominal: Soft, NT, ND +BS, No HSM  MSK: 5/5 strength UE/LE bilaterally  Extremities: No edema, 2+ peripheral pulses  Skin: No Rashes, Hematoma, Ecchymosis  Incisions:   Tubes:    HOSPITAL MEDICATIONS:  MEDICATIONS  (STANDING):  benzonatate 100 milliGRAM(s) Oral every 8 hours  chlorhexidine 2% Cloths 1 Application(s) Topical <User Schedule>  enoxaparin Injectable 40 milliGRAM(s) SubCutaneous every 24 hours  escitalopram 5 milliGRAM(s) Oral daily  gabapentin 200 milliGRAM(s) Oral two times a day  remdesivir  IVPB   IV Intermittent   remdesivir  IVPB 100 milliGRAM(s) IV Intermittent every 24 hours  sodium chloride 3%  Inhalation 4 milliLiter(s) Inhalation every 12 hours    MEDICATIONS  (PRN):  acetaminophen     Tablet .. 650 milliGRAM(s) Oral every 6 hours PRN Temp greater or equal to 38C (100.4F), Mild Pain (1 - 3)  guaiFENesin Oral Liquid (Sugar-Free) 100 milliGRAM(s) Oral every 6 hours PRN Cough  hydrocodone/homatropine Syrup 5 milliLiter(s) Oral every 6 hours PRN Cough      LABS:                        9.4    6.26  )-----------( 300      ( 28 Jun 2022 07:09 )             29.1     Hgb Trend: 9.4<--, 9.8<--, 10.1<--, 9.2<--, 9.1<--  06-28    131<L>  |  98  |  10  ----------------------------<  153<H>  3.9   |  26  |  0.55    Ca    8.1<L>      28 Jun 2022 07:11  Phos  2.8     06-28  Mg     2.1     06-28    TPro  8.4<H>  /  Alb  2.6<L>  /  TBili  0.3  /  DBili  x   /  AST  23  /  ALT  19  /  AlkPhos  69  06-28    Creatinine Trend: 0.55<--, 0.51<--, 0.55<--, 0.56<--, 0.61<--, 0.63<--            MICROBIOLOGY:     Culture - Acid Fast - Sputum w/Smear (collected 28 Jun 2022 09:20)  Source: .Sputum Sputum    Culture - Acid Fast - Sputum w/Smear (collected 27 Jun 2022 16:13)  Source: .Sputum Sputum       Tristen Loza | PGY1| Pager: [phone]  Interval Events: Febrile o/n. Continued discomfort due to cough. Continued complaint of LLE    REVIEW OF SYSTEMS:  CONSTITUTIONAL: No weakness, + fevers no chills  EYES/ENT: No visual changes  NECK: No pain  RESPIRATORY: + Cough No, wheezing, hemoptysis; No shortness of breath  CARDIOVASCULAR: + non-cardiac chest pain  no palpitations  GASTROINTESTINAL: No abdominal or epigastric pain. No nausea, vomiting, No diarrhea or constipation.  GENITOURINARY: No dysuria, frequency or hematuria  NEUROLOGICAL: Numbness LLE  SKIN: No itching, burning, rashes, or lesions   All other review of systems is negative unless indicated above.    OBJECTIVE:  ICU Vital Signs Last 24 Hrs  T(C): 36.9 (29 Jun 2022 06:21), Max: 38.2 (28 Jun 2022 20:50)  T(F): 98.4 (29 Jun 2022 06:21), Max: 100.7 (28 Jun 2022 20:50)  HR: 76 (29 Jun 2022 06:21) (76 - 89)  BP: 131/66 (29 Jun 2022 06:21) (117/60 - 155/68)  BP(mean): --  ABP: --  ABP(mean): --  RR: 18 (29 Jun 2022 06:21) (18 - 18)  SpO2: 97% (29 Jun 2022 06:21) (95% - 97%)        06-27 @ 07:01 - 06-28 @ 07:00  --------------------------------------------------------  IN: 1450 mL / OUT: 0 mL / NET: 1450 mL    06-28 @ 07:01 - 06-29 @ 06:39  --------------------------------------------------------  IN: 720 mL / OUT: 0 mL / NET: 720 mL      CAPILLARY BLOOD GLUCOSE          PHYSICAL EXAM:  General: WN/WD NAD, seen comfortably laying in bed on RA  Neurology: A&Ox3, nonfocal, PEREZ x 4  Eyes: Gross vision intact  ENT/Neck: Neck supple, trachea midline, No JVD, Gross hearing intact  Respiratory: CTA B/L, No wheezing, rales, rhonchi  CV: RRR, +S1/S2, -S3/S4, no murmurs, rubs or gallops  Abdominal: Soft, NT, ND +BS, No HSM  MSK: 5/5 strength UE/LE bilaterally  Extremities: No edema, 2+ peripheral pulses  Skin: No Rashes, Hematoma, Ecchymosis  Incisions:   Tubes:    HOSPITAL MEDICATIONS:  MEDICATIONS  (STANDING):  benzonatate 100 milliGRAM(s) Oral every 8 hours  chlorhexidine 2% Cloths 1 Application(s) Topical <User Schedule>  enoxaparin Injectable 40 milliGRAM(s) SubCutaneous every 24 hours  escitalopram 5 milliGRAM(s) Oral daily  gabapentin 200 milliGRAM(s) Oral two times a day  remdesivir  IVPB   IV Intermittent   remdesivir  IVPB 100 milliGRAM(s) IV Intermittent every 24 hours  sodium chloride 3%  Inhalation 4 milliLiter(s) Inhalation every 12 hours    MEDICATIONS  (PRN):  acetaminophen     Tablet .. 650 milliGRAM(s) Oral every 6 hours PRN Temp greater or equal to 38C (100.4F), Mild Pain (1 - 3)  guaiFENesin Oral Liquid (Sugar-Free) 100 milliGRAM(s) Oral every 6 hours PRN Cough  hydrocodone/homatropine Syrup 5 milliLiter(s) Oral every 6 hours PRN Cough      LABS:                        9.4    6.26  )-----------( 300      ( 28 Jun 2022 07:09 )             29.1     Hgb Trend: 9.4<--, 9.8<--, 10.1<--, 9.2<--, 9.1<--  06-28    131<L>  |  98  |  10  ----------------------------<  153<H>  3.9   |  26  |  0.55    Ca    8.1<L>      28 Jun 2022 07:11  Phos  2.8     06-28  Mg     2.1     06-28    TPro  8.4<H>  /  Alb  2.6<L>  /  TBili  0.3  /  DBili  x   /  AST  23  /  ALT  19  /  AlkPhos  69  06-28    Creatinine Trend: 0.55<--, 0.51<--, 0.55<--, 0.56<--, 0.61<--, 0.63<--            MICROBIOLOGY:     Culture - Acid Fast - Sputum w/Smear (collected 28 Jun 2022 09:20)  Source: .Sputum Sputum    Culture - Acid Fast - Sputum w/Smear (collected 27 Jun 2022 16:13)  Source: .Sputum Sputum       Tristen Loza | PGY1| Pager: [phone]  Interval Events: Febrile o/n. Continued discomfort due to cough. Continued complaint of LLE pain related to chronic back pain.     REVIEW OF SYSTEMS:  CONSTITUTIONAL: No weakness, + fevers no chills  EYES/ENT: No visual changes  NECK: No pain  RESPIRATORY: + Cough No, wheezing, hemoptysis; No shortness of breath  CARDIOVASCULAR: + non-cardiac chest pain  no palpitations  GASTROINTESTINAL: No abdominal or epigastric pain. No nausea, vomiting, No diarrhea or constipation.  GENITOURINARY: No dysuria, frequency or hematuria  NEUROLOGICAL: Numbness LLE  SKIN: No itching, burning, rashes, or lesions   All other review of systems is negative unless indicated above.    OBJECTIVE:  ICU Vital Signs Last 24 Hrs  T(C): 36.9 (29 Jun 2022 06:21), Max: 38.2 (28 Jun 2022 20:50)  T(F): 98.4 (29 Jun 2022 06:21), Max: 100.7 (28 Jun 2022 20:50)  HR: 76 (29 Jun 2022 06:21) (76 - 89)  BP: 131/66 (29 Jun 2022 06:21) (117/60 - 155/68)  BP(mean): --  ABP: --  ABP(mean): --  RR: 18 (29 Jun 2022 06:21) (18 - 18)  SpO2: 97% (29 Jun 2022 06:21) (95% - 97%)        06-27 @ 07:01 - 06-28 @ 07:00  --------------------------------------------------------  IN: 1450 mL / OUT: 0 mL / NET: 1450 mL    06-28 @ 07:01 - 06-29 @ 06:39  --------------------------------------------------------  IN: 720 mL / OUT: 0 mL / NET: 720 mL      CAPILLARY BLOOD GLUCOSE          PHYSICAL EXAM:  General: WN/WD NAD, seen comfortably laying in bed on RA  Neurology: A&Ox3, nonfocal, PEREZ x 4  Eyes: Gross vision intact  ENT/Neck: Neck supple, trachea midline, No JVD, Gross hearing intact  Respiratory: CTA B/L, No wheezing, rales, rhonchi  CV: RRR, +S1/S2, -S3/S4, no murmurs, rubs or gallops  Abdominal: Soft, NT, ND +BS, No HSM  MSK: 5/5 strength UE/LE bilaterally  Extremities: No edema, 2+ peripheral pulses  Skin: No Rashes, Hematoma, Ecchymosis  Incisions:   Tubes:    HOSPITAL MEDICATIONS:  MEDICATIONS  (STANDING):  benzonatate 100 milliGRAM(s) Oral every 8 hours  chlorhexidine 2% Cloths 1 Application(s) Topical <User Schedule>  enoxaparin Injectable 40 milliGRAM(s) SubCutaneous every 24 hours  escitalopram 5 milliGRAM(s) Oral daily  gabapentin 200 milliGRAM(s) Oral two times a day  remdesivir  IVPB   IV Intermittent   remdesivir  IVPB 100 milliGRAM(s) IV Intermittent every 24 hours  sodium chloride 3%  Inhalation 4 milliLiter(s) Inhalation every 12 hours    MEDICATIONS  (PRN):  acetaminophen     Tablet .. 650 milliGRAM(s) Oral every 6 hours PRN Temp greater or equal to 38C (100.4F), Mild Pain (1 - 3)  guaiFENesin Oral Liquid (Sugar-Free) 100 milliGRAM(s) Oral every 6 hours PRN Cough  hydrocodone/homatropine Syrup 5 milliLiter(s) Oral every 6 hours PRN Cough      LABS:                        9.4    6.26  )-----------( 300      ( 28 Jun 2022 07:09 )             29.1     Hgb Trend: 9.4<--, 9.8<--, 10.1<--, 9.2<--, 9.1<--  06-28    131<L>  |  98  |  10  ----------------------------<  153<H>  3.9   |  26  |  0.55    Ca    8.1<L>      28 Jun 2022 07:11  Phos  2.8     06-28  Mg     2.1     06-28    TPro  8.4<H>  /  Alb  2.6<L>  /  TBili  0.3  /  DBili  x   /  AST  23  /  ALT  19  /  AlkPhos  69  06-28    Creatinine Trend: 0.55<--, 0.51<--, 0.55<--, 0.56<--, 0.61<--, 0.63<--            MICROBIOLOGY:     Culture - Acid Fast - Sputum w/Smear (collected 28 Jun 2022 09:20)  Source: .Sputum Sputum    Culture - Acid Fast - Sputum w/Smear (collected 27 Jun 2022 16:13)  Source: .Sputum Sputum

## 2022-06-29 NOTE — PROGRESS NOTE ADULT - PROBLEM SELECTOR PLAN 5
Awake/Alert -Pt on losartan and amlodipine at home  -Will hold in the setting of sepsis, restart as able

## 2022-06-29 NOTE — PROGRESS NOTE ADULT - PROBLEM SELECTOR PLAN 1
- Tested positive 6/24;  positive 2 weeks ago; daughter currently positive  - fully vaccinated, boosted  - elevated d-dimer, ferritin, CRP consistent with covid  - CT chest 6/24 consistent with covid pna  - not a candidate for monoclonal antibiodies per [facility] since she presented with covid    Plan:  - started remdisivir x5d (6/25 -6/29 )  -symptomatic tx of cough with tessalon perles, robitussin, hycodan for severe cough COVID PNA without oxygen requirements now s/p remdesivir 5/5 doses. Tuberculosis negative w/ quantiferon gold, urine histoplasma antigen, and AFB smears x3.    - Tested positive 6/24;  positive 2 weeks ago; daughter currently positive  - fully vaccinated, boosted  - elevated d-dimer, ferritin, CRP consistent with covid  - CT chest 6/24 consistent with covid pna  - not a candidate for monoclonal antibiodies per EUA since she presented with covid    Plan:  - Finished remdesivir x5d (6/25 -6/29 )  -symptomatic tx of cough with tessalon perles, robitussin, hycodan for severe cough

## 2022-06-29 NOTE — PROGRESS NOTE ADULT - ASSESSMENT
66 yo female with PMHx of recently diagnosed multiple myeloma (not on chemo), HTN p/w fever with T.max of 102 and non productive cough likely 2/2 COVID pneumonia tx w/ remdesevir. AFB for TB negative x2 pending 1 more smear. 68 yo female with PMHx of recently diagnosed multiple myeloma (not on chemo), HTN p/w fever with T.max of 102 and non productive cough likely 2/2 COVID pneumonia tx w/ remdesevir s/p 5/5 doses found AFB (-), quantiferon (-), with persisting fevers and epistaxis s/p nasal cauterization    68 yo female with PMHx of recently diagnosed multiple myeloma (not on chemo), HTN p/w fever with T.max of 102 and non productive cough likely 2/2 COVID pneumonia tx w/ remdesevir s/p 5/5 doses found AFB (-), quantiferon (-), with persisting fevers also complaining of epistaxis s/p nasal cauterization by ENT on 6/28

## 2022-06-30 DIAGNOSIS — R50.9 FEVER, UNSPECIFIED: ICD-10-CM

## 2022-06-30 LAB
ALBUMIN SERPL ELPH-MCNC: 2.5 G/DL — LOW (ref 3.3–5)
ALP SERPL-CCNC: 73 U/L — SIGNIFICANT CHANGE UP (ref 40–120)
ALT FLD-CCNC: 19 U/L — SIGNIFICANT CHANGE UP (ref 10–45)
AST SERPL-CCNC: 22 U/L — SIGNIFICANT CHANGE UP (ref 10–40)
BILIRUB DIRECT SERPL-MCNC: <0.1 MG/DL — SIGNIFICANT CHANGE UP (ref 0–0.3)
BILIRUB INDIRECT FLD-MCNC: >0.3 MG/DL — SIGNIFICANT CHANGE UP (ref 0.2–1)
BILIRUB SERPL-MCNC: 0.4 MG/DL — SIGNIFICANT CHANGE UP (ref 0.2–1.2)
CREAT SERPL-MCNC: 0.57 MG/DL — SIGNIFICANT CHANGE UP (ref 0.5–1.3)
EGFR: 100 ML/MIN/1.73M2 — SIGNIFICANT CHANGE UP
PROT SERPL-MCNC: 8.4 G/DL — HIGH (ref 6–8.3)

## 2022-06-30 PROCEDURE — 99232 SBSQ HOSP IP/OBS MODERATE 35: CPT

## 2022-06-30 PROCEDURE — 99232 SBSQ HOSP IP/OBS MODERATE 35: CPT | Mod: GC

## 2022-06-30 RX ADMIN — Medication 2 SPRAY(S): at 06:22

## 2022-06-30 RX ADMIN — ENOXAPARIN SODIUM 40 MILLIGRAM(S): 100 INJECTION SUBCUTANEOUS at 17:44

## 2022-06-30 RX ADMIN — Medication 100 MILLIGRAM(S): at 06:21

## 2022-06-30 RX ADMIN — Medication 2 SPRAY(S): at 22:51

## 2022-06-30 RX ADMIN — GABAPENTIN 200 MILLIGRAM(S): 400 CAPSULE ORAL at 06:21

## 2022-06-30 RX ADMIN — SODIUM CHLORIDE 4 MILLILITER(S): 9 INJECTION INTRAMUSCULAR; INTRAVENOUS; SUBCUTANEOUS at 06:22

## 2022-06-30 RX ADMIN — Medication 2 SPRAY(S): at 00:30

## 2022-06-30 RX ADMIN — Medication 100 MILLIGRAM(S): at 13:13

## 2022-06-30 RX ADMIN — Medication 1 APPLICATION(S): at 06:21

## 2022-06-30 RX ADMIN — Medication 2 SPRAY(S): at 17:41

## 2022-06-30 RX ADMIN — Medication 2 SPRAY(S): at 13:14

## 2022-06-30 RX ADMIN — Medication 100 MILLIGRAM(S): at 13:12

## 2022-06-30 RX ADMIN — CHLORHEXIDINE GLUCONATE 1 APPLICATION(S): 213 SOLUTION TOPICAL at 13:14

## 2022-06-30 RX ADMIN — ESCITALOPRAM OXALATE 5 MILLIGRAM(S): 10 TABLET, FILM COATED ORAL at 13:13

## 2022-06-30 RX ADMIN — Medication 1 APPLICATION(S): at 17:42

## 2022-06-30 RX ADMIN — Medication 100 MILLIGRAM(S): at 22:49

## 2022-06-30 RX ADMIN — GABAPENTIN 200 MILLIGRAM(S): 400 CAPSULE ORAL at 17:43

## 2022-06-30 NOTE — PROGRESS NOTE ADULT - PROBLEM SELECTOR PLAN 6
-Being followed by Dr. Munir Sam of [facility]  -Treatment was tentatively scheduled to start 7/5, Pending second opinion at [place] on 6/29/2022  -[facility] onc following

## 2022-06-30 NOTE — PROGRESS NOTE ADULT - SUBJECTIVE AND OBJECTIVE BOX
Patient is a 67y old  Female who presents with a chief complaint of fever and cough (30 Jun 2022 06:35)    Being followed by ID for        Interval history:  No other acute events      ROS:  No cough,SOB,CP  No N/V/D  No abd pain  No urinary complaints  No HA  No joint or limb pain  No other complaints    PAST MEDICAL & SURGICAL HISTORY:  HTN (hypertension)      HLD (hyperlipidemia)      Multiple myeloma      No significant past surgical history        Allergies    No Known Allergies    Intolerances      Antimicrobials:      MEDICATIONS  (STANDING):  BACItracin   Ointment 1 Application(s) Topical two times a day  benzonatate 100 milliGRAM(s) Oral every 8 hours  chlorhexidine 2% Cloths 1 Application(s) Topical <User Schedule>  enoxaparin Injectable 40 milliGRAM(s) SubCutaneous every 24 hours  escitalopram 5 milliGRAM(s) Oral daily  gabapentin 200 milliGRAM(s) Oral two times a day  sodium chloride 0.65% Nasal 2 Spray(s) Both Nostrils four times a day  sodium chloride 3%  Inhalation 4 milliLiter(s) Inhalation every 12 hours      Vital Signs Last 24 Hrs  T(C): 36.7 (06-30-22 @ 14:24), Max: 37.3 (06-29-22 @ 16:45)  T(F): 98 (06-30-22 @ 14:24), Max: 99.2 (06-29-22 @ 16:45)  HR: 84 (06-30-22 @ 14:24) (84 - 92)  BP: 139/69 (06-30-22 @ 14:24) (139/69 - 156/79)  BP(mean): --  RR: 18 (06-30-22 @ 14:24) (18 - 18)  SpO2: 97% (06-30-22 @ 14:24) (96% - 98%)    Physical Exam:    Constitutional well preserved,comfortable,pleasant    HEENT PERRLA EOMI,No pallor or icterus    No oral exudate or erythema    Neck supple no JVD or LN    Chest Good AE,CTA    CVS RRR S1 S2 WNl No murmur or rub or gallop    Abd soft BS normal No tenderness no masses    Ext No cyanosis clubbing or edema    IV site no erythema tenderness or discharge    Joints no swelling or LOM    CNS AAO X 3 no focal    Lab Data:                          9.5    6.48  )-----------( 339      ( 29 Jun 2022 07:45 )             29.8       06-30    x   |  x   |  x   ----------------------------<  x   x    |  x   |  0.57    Ca    8.3<L>      29 Jun 2022 07:45  Phos  3.6     06-29  Mg     2.1     06-29    TPro  8.4<H>  /  Alb  2.5<L>  /  TBili  0.4  /  DBili  <0.1  /  AST  22  /  ALT  19  /  AlkPhos  73  06-30          .Sputum Sputum  06-28-22   Culture is being performed.  --  --      .Sputum Sputum  06-27-22   Culture is being performed.  --  --      .Sputum Sputum  06-25-22   Culture is being performed.  --  --      .Sputum Sputum  06-24-22   Normal Respiratory Sahra present  --    Few polymorphonuclear leukocytes per low power field  Numerous Squamous epithelial cells per low power field  Numerous Gram Positive Cocci in Clusters seen per oil power field  Numerous Gram Variable Rods seen per oil power field                    WBC Count: 6.48 (06-29-22 @ 07:45)  WBC Count: 6.26 (06-28-22 @ 07:09)  WBC Count: 10.21 (06-27-22 @ 07:24)  WBC Count: 8.78 (06-26-22 @ 07:09)  WBC Count: 9.66 (06-25-22 @ 07:34)  WBC Count: 8.94 (06-24-22 @ 07:14)             Patient is a 67y old  Female who presents with a chief complaint of fever and cough (30 Jun 2022 06:35)    Being followed by ID for fevers        Interval history:  temps are improved   pt feels a little stronger  left leg is hurting patient  No other acute events          PAST MEDICAL & SURGICAL HISTORY:  HTN (hypertension)      HLD (hyperlipidemia)      Multiple myeloma      No significant past surgical history        Allergies    No Known Allergies    Intolerances      Antimicrobials:      MEDICATIONS  (STANDING):  BACItracin   Ointment 1 Application(s) Topical two times a day  benzonatate 100 milliGRAM(s) Oral every 8 hours  chlorhexidine 2% Cloths 1 Application(s) Topical <User Schedule>  enoxaparin Injectable 40 milliGRAM(s) SubCutaneous every 24 hours  escitalopram 5 milliGRAM(s) Oral daily  gabapentin 200 milliGRAM(s) Oral two times a day  sodium chloride 0.65% Nasal 2 Spray(s) Both Nostrils four times a day  sodium chloride 3%  Inhalation 4 milliLiter(s) Inhalation every 12 hours      Vital Signs Last 24 Hrs  T(C): 36.7 (06-30-22 @ 14:24), Max: 37.3 (06-29-22 @ 16:45)  T(F): 98 (06-30-22 @ 14:24), Max: 99.2 (06-29-22 @ 16:45)  HR: 84 (06-30-22 @ 14:24) (84 - 92)  BP: 139/69 (06-30-22 @ 14:24) (139/69 - 156/79)  BP(mean): --  RR: 18 (06-30-22 @ 14:24) (18 - 18)  SpO2: 97% (06-30-22 @ 14:24) (96% - 98%)    Physical Exam:    Constitutional well preserved,comfortable,pleasant    HEENT PERRLA EOMI,No pallor or icterus    No oral exudate or erythema    Neck supple no JVD or LN    Chest Good AE,CTA    CVS  S1 S2     Abd soft BS normal No tenderness     Ext No cyanosis clubbing or edema    IV site no erythema tenderness or discharge    Joints no swelling or LOM    CNS AAO X 3 no focal    Lab Data:                          9.5    6.48  )-----------( 339      ( 29 Jun 2022 07:45 )             29.8       06-30    x   |  x   |  x   ----------------------------<  x   x    |  x   |  0.57    Ca    8.3<L>      29 Jun 2022 07:45  Phos  3.6     06-29  Mg     2.1     06-29    TPro  8.4<H>  /  Alb  2.5<L>  /  TBili  0.4  /  DBili  <0.1  /  AST  22  /  ALT  19  /  AlkPhos  73  06-30      Culture - Acid Fast - Sputum w/Smear . (06.28.22 @ 09:20)    Acid Fast Bacilli Smear:   No acid fast bacilli seen by fluorochrome stain    Culture - Acid Fast - Sputum w/Smear . (06.27.22 @ 16:13)    Acid Fast Bacilli Smear:   No acid fast bacilli seen by fluorochrome stain    Culture - Acid Fast - Sputum w/Smear . (06.25.22 @ 09:37)    Acid Fast Bacilli Smear:   No acid fast bacilli seen by fluorochrome stain        .Sputum Sputum  06-24-22   Normal Respiratory Sahra present  --    Few polymorphonuclear leukocytes per low power field  Numerous Squamous epithelial cells per low power field  Numerous Gram Positive Cocci in Clusters seen per oil power field  Numerous Gram Variable Rods seen per oil power field    Histoplasma Antigen - Urine (06.25.22 @ 09:35)    Histoplasma Antigen - Urine: NotDetec: No Histoplasma antigen detected.  False negative results may occur.  Repeat testing on a  new specimen should be considered if clinically indicated.    Histoplasma Ag Value: NotDetec: -------------------ADDITIONAL INFORMATION-------------------  This test has been modified from the manufacturers  instructions. Its performance characteristics were  determined by [facility] in a manner consistent with  CLIA requirements. This test has not been cleared or  approved by the U.S. Food and Drug Administration.  Test Performed by:  [facility]  [address] [place] [id number]  : Joesph Joe M.D. Ph.D.; CLIA# [id number] ng/mL        D-Dimer Assay, Quantitative (06.26.22 @ 07:09)    D-Dimer Assay, Quantitative: 392: “D-Dimer result less than or equal to 229ng/mL DDU correlates with the  absence of thrombosis in a patient with a low and moderate pre-test  probability of thrombosis. 1DDU is approximately equal to 2ng/mL FEU  (previous unit)” ng/mL DDU      Quantiferon Plus TB (06.25.22 @ 07:07)    Quantiferon TB Plus: NEGATIVE: NEGATIVE: M. tuberculosis infection is NOT likely. No interferon-gamma  response to M. tuberculosis antigens was detected  POSITIVE: M. tuberculosis infection is likely. Interferon-gamma response  to M. tuberculosis antigens was detected. False-positive results may  occur in patients with prior infection with M. marinum, M. szulgai, or M.  kansasii.  INDETERMINATE: Likelihood of M. tuberculosis infection cannot be  determined. Repeat testing on a new specimen is suggested.  The QuantiFERON-TB Gold Plus assay measures T-cell release of  interferon-gamma following stimulation by M. tuberculosis complex  antigens (ESAT-6 and CFP-10). The magnitude of the amount of measured  interferon-gamma cannot be correlated to stage or degree of infection,  level of immune responsiveness, or likelihood for progression to active  disease. Results should be used in conjunction with risk assessment,  radiography, and other medical and diagnostic evaluations.    Quantiferon TB Plus Nil: 0.05 IU/mL    Quantiferon TB Plus TB1 minus Nil: 0.04 IU/mL    Quantiferon TB Plus TB2 minus Nil: 0.02 IU/mL    Quant TB Plus Mitogen minus Nil: >10.00 IU/mL              WBC Count: 6.48 (06-29-22 @ 07:45)  WBC Count: 6.26 (06-28-22 @ 07:09)  WBC Count: 10.21 (06-27-22 @ 07:24)  WBC Count: 8.78 (06-26-22 @ 07:09)  WBC Count: 9.66 (06-25-22 @ 07:34)  WBC Count: 8.94 (06-24-22 @ 07:14)

## 2022-06-30 NOTE — PROGRESS NOTE ADULT - SUBJECTIVE AND OBJECTIVE BOX
CC  Bilateral epistaxis    HPI:66 yo female with PMHx of recently diagnosed multiple myeloma (not on chemo), HTN p/w fever and cough. She was seen at [facility] for similar symptoms 2 weeks ago and was discharged with cefpodoxime and azithromycin. Per pt, her symptoms did not resolve. She started having a non-productive cough a few days ago  and had a temp of 102. Pt  and mother tested positive for COVID 3 days ago. She took a home test today which was negative. She also endorses mild dysuria intermittently. Pt states that she has having low grade fever (99 to 100.1) for the past 2 months with left lower leg pain/numbness. She denies any chest pain, SOB, N/V, abdominal pain or dizziness. Pt. states today that she notices blood clots from both nares, denies active bleeding or hemoptysis. dry old blood       PAST MEDICAL & SURGICAL HISTORY:  HTN (hypertension)      HLD (hyperlipidemia)      Multiple myeloma      No significant past surgical history        Allergies    No Known Allergies    Intolerances      MEDICATIONS  (STANDING):  BACItracin   Ointment 1 Application(s) Topical two times a day  benzonatate 100 milliGRAM(s) Oral every 8 hours  chlorhexidine 2% Cloths 1 Application(s) Topical <User Schedule>  enoxaparin Injectable 40 milliGRAM(s) SubCutaneous every 24 hours  escitalopram 5 milliGRAM(s) Oral daily  gabapentin 200 milliGRAM(s) Oral two times a day  sodium chloride 0.65% Nasal 2 Spray(s) Both Nostrils four times a day  sodium chloride 3%  Inhalation 4 milliLiter(s) Inhalation every 12 hours    MEDICATIONS  (PRN):  acetaminophen     Tablet .. 650 milliGRAM(s) Oral every 6 hours PRN Temp greater or equal to 38C (100.4F), Mild Pain (1 - 3)  guaiFENesin Oral Liquid (Sugar-Free) 100 milliGRAM(s) Oral every 6 hours PRN Cough  hydrocodone/homatropine Syrup 5 milliLiter(s) Oral every 6 hours PRN Cough      social history: see consult     family history: see consult     ROS:   ENT: all negative except as noted in HPI   Pulm: denies SOB, cough, hemoptysis  Neuro: denies numbness/tingling, loss of sensation  Endo: denies heat/cold intolerance, excessive sweating      Vital Signs Last 24 Hrs  T(C): 37.3 (30 Jun 2022 15:58), Max: 37.3 (29 Jun 2022 20:11)  T(F): 99.2 (30 Jun 2022 15:58), Max: 99.2 (29 Jun 2022 20:11)  HR: 88 (30 Jun 2022 15:58) (84 - 91)  BP: 155/73 (30 Jun 2022 15:58) (139/69 - 156/79)  BP(mean): --  RR: 18 (30 Jun 2022 15:58) (18 - 18)  SpO2: 98% (30 Jun 2022 15:58) (96% - 98%)                          9.5    6.48  )-----------( 339      ( 29 Jun 2022 07:45 )             29.8    06-30    x   |  x   |  x   ----------------------------<  x   x    |  x   |  0.57    Ca    8.3<L>      29 Jun 2022 07:45  Phos  3.6     06-29  Mg     2.1     06-29    TPro  8.4<H>  /  Alb  2.5<L>  /  TBili  0.4  /  DBili  <0.1  /  AST  22  /  ALT  19  /  AlkPhos  73  06-30       PHYSICAL EXAM:  Gen: NAD  Skin: No rashes, bruises, or lesions  Head: Normocephalic, Atraumatic  Face: no edema, erythema, or fluctuance. Parotid glands soft without mass  Eyes: no scleral injection  Nose: Nares bilaterally patent, no discharge, no active bleed noted   Mouth: No Stridor / Drooling / Trismus.  Mucosa moist, tongue/uvula midline, oropharynx clear  Neck: Flat, supple, no lymphadenopathy, trachea midline, no masses  Lymphatic: No lymphadenopathy  Resp: breathing easily, no stridor  Neuro: facial nerve intact, no facial droop

## 2022-06-30 NOTE — PROGRESS NOTE ADULT - PROBLEM SELECTOR PLAN 1
COVID PNA without oxygen requirements now s/p remdesivir 5/5 doses. Tuberculosis negative w/ quantiferon gold, urine histoplasma antigen, and AFB smears x3.    - Tested positive 6/24;  positive 2 weeks ago; daughter currently positive  - fully vaccinated, boosted  - elevated d-dimer, ferritin, CRP consistent with covid  - CT chest 6/24 consistent with covid pna  - not a candidate for monoclonal antibiodies per EUA since she presented with covid    Plan:  - Finished remdesivir x5d (6/25 -6/29 )  -symptomatic tx of cough with tessalon perles, robitussin, hycodan for severe cough Patient with 1 month of fevers at home prior to covid infection -edwin for TB, recovering from COVID pna s/p remdesivir 5/5 doses. Still with fevers and night sweats.    No fevers overnight 6/29-6/30  f/u ID recs prior to d/c

## 2022-06-30 NOTE — PROGRESS NOTE ADULT - PROBLEM SELECTOR PLAN 4
- Pt having chronic left leg pain and numbness 2/2 lumbar disc herniation  - Outpatient x-ray negative for fracture dislocation  - gabapentin 200mg BID per pt preference  - PT eval appreciated

## 2022-06-30 NOTE — PROGRESS NOTE ADULT - PROBLEM SELECTOR PLAN 7
DVT ppx: Lovenox  Diet: regular + ensures    Dispo: pending clinical improvement sepsis and fevers likely 2/2 COVID PNA however she has had sxs for the past month with low-grade fevers  -BCx NGTD, MRSA neg  -cefepime dc'd as sputum culture nonspecific, no other evidence of bacterial pneumonia    Plan:  D/w ID the utility of CT Abdomen for r/o abdomenal abscess- if febrile again will obtain abdomenal CT

## 2022-06-30 NOTE — PROGRESS NOTE ADULT - PROBLEM SELECTOR PLAN 2
Patient describing nasal clot formation with congestion ENT consulted noticed Right septum excoriation now s/p cauterization    - ENT Recommendations:   Nasal saline 2 sprays to bilateral nares QID  Bacitracin to nares BID  No nose rubbing, blowing, sneeze with open mouth and pinching nares  Avoid bending with head below the waist  No heavy lifting  Monitor H/H, Plat. PT/INR  Maintain active T&S  Transfusion support PRN.

## 2022-06-30 NOTE — PROGRESS NOTE ADULT - ASSESSMENT
67y with a few episodes of old blood being blown from nose, not actively bleeding, likely 2/2 covid infection, mucosa remains inflamed

## 2022-06-30 NOTE — PROGRESS NOTE ADULT - PROBLEM SELECTOR PLAN 3
sepsis and fevers likely 2/2 COVID PNA however she has had sxs for the past month with low-grade fevers  -BCx NGTD, MRSA neg  -cefepime dc'd as sputum culture nonspecific, no other evidence of bacterial pneumonia    Plan:  D/w ID the utility of CT Abdomen for r/o abdomenal abscess- if febrile again will obtain abdomenal CT COVID PNA without oxygen requirements now s/p remdesivir 5/5 doses. Tuberculosis negative w/ quantiferon gold, urine histoplasma antigen, and AFB smears x3.    - Tested positive 6/24;  positive 2 weeks ago; daughter currently positive  - fully vaccinated, boosted  - elevated d-dimer, ferritin, CRP consistent with covid  - CT chest 6/24 consistent with covid pna  - not a candidate for monoclonal antibiodies per EUA since she presented with covid    Plan:  - Finished remdesivir x5d (6/25 -6/29)  - f/u ID recs prior to d/c  -symptomatic tx of cough with tessalon perles, robitussin, hycodan for severe cough

## 2022-06-30 NOTE — PROGRESS NOTE ADULT - ASSESSMENT
66 yo female with PMHx of recently diagnosed multiple myeloma (not on chemo), HTN p/w fever and cough. She was seen at [facility] for similar symptoms 2 weeks ago and was discharged with cefpodoxime and azithromycin. According to arturo she presented with low grade fevers and leg sxs. She was diagnosed with multiple myeloma and the soctor said since you have this diagnosis and fever , you should go to the ER. The ER gave her oral abs and d/john her Per pt, her symptoms did not resolve. She started having a non-productive cough yesterday and had a temp of 102. Pt  and mother tested positive for COVID 3 days ago. She took a home test today which was negative. She also endorses mild dysuria intermittently. Pt states that she has having low grade fever (99 to 100.1) for the past 2 months with left lower leg pain/numbness. She denies any chest pain, SOB, N/V, abdominal pain or dizziness.   pt has been coughing up blood for the past month. Pt believes it is coming from her nose. She can also blow her nose with clots. She went to an ENt who did not find anything   Pt's daughter had MTb when she was [age] and was treated for a year. The patient took care of her at the time  In the ED, pt had a Tmax of 100.8, HR of 110, BP of 154/73 and saturating well on RA. In the ED, pt given 500cc bolus and Vanc + cefepime (23 Jun 2022 12:19)  Pt was started on abs but continues to spike .Covid swab was negative  CXR with RUL infiltrate    A/P    #COVID 19  Likely etiology of recent high grade fevers  However, low grade fevers for weeks (which I doubt is secondary to COVID19)  --Maintain COVID19 Isolation Precautions  --Recommend Ferritin, CRP, D-Dimer every 48-72H  --Recommend Remdesivir x 5 days total - monitor BUN/Cr and LFT's- completd  --No indication for Dexamethasone at present    #Fever    -- cefepime d/john  --Quanteferon is negative! This doesn't r/o MTb but makes it less ikely   3rd sputum is negative   urine histo negative   blood cultures are negative     #LLE NUMBNESS  Hematology/Oncology called to see patient who is well known to Dr. Munir Sam of [facility] for the management of IgG Lambda multiple myeloma. Skeletal survey showed two small lucencies in the femoral neck but PET/CT was normal and patient has normal renal function and minimal anemia. Her only complaint was peripheral neuropathy in her feet. However, secondary to rising kappa lambda FLC ratio, M protein and other high risk features, treatment was recommended (Dr. Sam recommending daratumumab, bortezomib lenalidomide and dexamethazone) that was tentatively scheduled to start on 7/5/2022. Patient was planning to get a second opinion at [facility] before starting treatment. When the patient was seen by Dr. Sam on 6/17, she was experiencing fevers to 102 and was referred to the ED.  ? was back imaged as well??- check XRAY of LS spine- look for lesions     #Epistaxis  appreciate ENT input   an area of excoriation  on the right septum was noted and cauterized. Bacitracin was placed and bleeding controlled.  no further active bleeding    Dixie Mercedes M.D. ,   please reach via teams   If no answer, or after 5PM/ weekends,  then please call  [phone]    Assessment and plan discussed with the primary team .

## 2022-06-30 NOTE — PROGRESS NOTE ADULT - SUBJECTIVE AND OBJECTIVE BOX
INCOMPLETE NOTE    Tristen Loza | PGY1| Pager: [phone]  Interval Events:    REVIEW OF SYSTEMS:  CONSTITUTIONAL: No weakness, fevers or chills  EYES/ENT: No visual changes;  No vertigo or throat pain   NECK: No pain or stiffness  RESPIRATORY: No cough, wheezing, hemoptysis; No shortness of breath  CARDIOVASCULAR: No chest pain or palpitations  GASTROINTESTINAL: No abdominal or epigastric pain. No nausea, vomiting, or hematemesis; No diarrhea or constipation. No melena or hematochezia.  GENITOURINARY: No dysuria, frequency or hematuria  NEUROLOGICAL: No numbness or weakness  SKIN: No itching, burning, rashes, or lesions   All other review of systems is negative unless indicated above.    OBJECTIVE:  ICU Vital Signs Last 24 Hrs  T(C): 37.3 (30 Jun 2022 00:47), Max: 37.3 (29 Jun 2022 16:45)  T(F): 99.1 (30 Jun 2022 00:47), Max: 99.2 (29 Jun 2022 16:45)  HR: 88 (30 Jun 2022 00:47) (82 - 92)  BP: 144/78 (30 Jun 2022 00:47) (135/68 - 156/79)  BP(mean): --  ABP: --  ABP(mean): --  RR: 18 (30 Jun 2022 00:47) (18 - 18)  SpO2: 97% (30 Jun 2022 00:47) (96% - 98%)        06-28 @ 07:01 - 06-29 @ 07:00  --------------------------------------------------------  IN: 840 mL / OUT: 0 mL / NET: 840 mL    06-29 @ 07:01  -  06-30 @ 06:36  --------------------------------------------------------  IN: 1330 mL / OUT: 0 mL / NET: 1330 mL      CAPILLARY BLOOD GLUCOSE          PHYSICAL EXAM:  General: WN/WD NAD  Neurology: A&Ox3, nonfocal, PEREZ x 4  Eyes: PERRLA/ EOMI, Gross vision intact  ENT/Neck: Neck supple, trachea midline, No JVD, Gross hearing intact  Respiratory: CTA B/L, No wheezing, rales, rhonchi  CV: RRR, +S1/S2, -S3/S4, no murmurs, rubs or gallops  Abdominal: Soft, NT, ND +BS, No HSM  MSK: 5/5 strength UE/LE bilaterally  Extremities: No edema, 2+ peripheral pulses  Skin: No Rashes, Hematoma, Ecchymosis  Incisions:   Tubes:    HOSPITAL MEDICATIONS:  MEDICATIONS  (STANDING):  BACItracin   Ointment 1 Application(s) Topical two times a day  benzonatate 100 milliGRAM(s) Oral every 8 hours  chlorhexidine 2% Cloths 1 Application(s) Topical <User Schedule>  enoxaparin Injectable 40 milliGRAM(s) SubCutaneous every 24 hours  escitalopram 5 milliGRAM(s) Oral daily  gabapentin 200 milliGRAM(s) Oral two times a day  sodium chloride 0.65% Nasal 2 Spray(s) Both Nostrils four times a day  sodium chloride 3%  Inhalation 4 milliLiter(s) Inhalation every 12 hours    MEDICATIONS  (PRN):  acetaminophen     Tablet .. 650 milliGRAM(s) Oral every 6 hours PRN Temp greater or equal to 38C (100.4F), Mild Pain (1 - 3)  guaiFENesin Oral Liquid (Sugar-Free) 100 milliGRAM(s) Oral every 6 hours PRN Cough  hydrocodone/homatropine Syrup 5 milliLiter(s) Oral every 6 hours PRN Cough      LABS:                        9.5    6.48  )-----------( 339      ( 29 Jun 2022 07:45 )             29.8     Hgb Trend: 9.5<--, 9.4<--, 9.8<--, 10.1<--, 9.2<--  06-29    133<L>  |  100  |  12  ----------------------------<  93  4.0   |  26  |  0.56    Ca    8.3<L>      29 Jun 2022 07:45  Phos  3.6     06-29  Mg     2.1     06-29    TPro  8.7<H>  /  Alb  2.5<L>  /  TBili  0.6  /  DBili  <0.1  /  AST  27  /  ALT  18  /  AlkPhos  71  06-29    Creatinine Trend: 0.56<--, 0.55<--, 0.51<--, 0.55<--, 0.56<--, 0.61<--            MICROBIOLOGY:     Culture - Acid Fast - Sputum w/Smear (collected 28 Jun 2022 09:20)  Source: .Sputum Sputum  Preliminary Report (29 Jun 2022 15:04):    Culture is being performed.    Culture - Acid Fast - Sputum w/Smear (collected 27 Jun 2022 16:13)  Source: .Sputum Sputum  Preliminary Report (29 Jun 2022 15:04):    Culture is being performed.       Tristen Millerporter | PGY1| Pager: [phone]  Interval Events:NAEON. No fevers overnight. Reports improvement in symptoms related to cough and fever. Notes night sweats. still with clots from nose.    REVIEW OF SYSTEMS:  CONSTITUTIONAL: No weakness, fevers or chills  EYES/ENT: No visual changes;  No vertigo or throat pain   NECK: No pain or stiffness  RESPIRATORY: +cough no, wheezing, hemoptysis; No shortness of breath  CARDIOVASCULAR: No chest pain or palpitations  GASTROINTESTINAL: No abdominal or epigastric pain. No nausea, vomiting, or hematemesis; No diarrhea or constipation. No melena or hematochezia.  GENITOURINARY: No dysuria, frequency or hematuria  NEUROLOGICAL: No numbness or weakness  SKIN: No itching, burning, rashes, or lesions   All other review of systems is negative unless indicated above.    OBJECTIVE:  ICU Vital Signs Last 24 Hrs  T(C): 37.3 (30 Jun 2022 00:47), Max: 37.3 (29 Jun 2022 16:45)  T(F): 99.1 (30 Jun 2022 00:47), Max: 99.2 (29 Jun 2022 16:45)  HR: 88 (30 Jun 2022 00:47) (82 - 92)  BP: 144/78 (30 Jun 2022 00:47) (135/68 - 156/79)  BP(mean): --  ABP: --  ABP(mean): --  RR: 18 (30 Jun 2022 00:47) (18 - 18)  SpO2: 97% (30 Jun 2022 00:47) (96% - 98%)        06-28 @ 07:01 - 06-29 @ 07:00  --------------------------------------------------------  IN: 840 mL / OUT: 0 mL / NET: 840 mL    06-29 @ 07:01 - 06-30 @ 06:36  --------------------------------------------------------  IN: 1330 mL / OUT: 0 mL / NET: 1330 mL      CAPILLARY BLOOD GLUCOSE          PHYSICAL EXAM:  General: WN/WD NAD, laying in bed sleeping  Neurology: A&Ox3, nonfocal, PEREZ x 4  Eyes: PERRLA/ EOMI, Gross vision intact  ENT/Neck: Neck supple, trachea midline, No JVD, Gross hearing intact  Respiratory: CTA B/L, No wheezing, rales, rhonchi  CV: RRR, +S1/S2, -S3/S4, no murmurs, rubs or gallops  Abdominal: Soft, NT, ND +BS, No HSM  MSK: 5/5 strength UE/LE bilaterally  Extremities: No edema, 2+ peripheral pulses  Skin: No Rashes, Hematoma, Ecchymosis  Incisions:   Tubes:    HOSPITAL MEDICATIONS:  MEDICATIONS  (STANDING):  BACItracin   Ointment 1 Application(s) Topical two times a day  benzonatate 100 milliGRAM(s) Oral every 8 hours  chlorhexidine 2% Cloths 1 Application(s) Topical <User Schedule>  enoxaparin Injectable 40 milliGRAM(s) SubCutaneous every 24 hours  escitalopram 5 milliGRAM(s) Oral daily  gabapentin 200 milliGRAM(s) Oral two times a day  sodium chloride 0.65% Nasal 2 Spray(s) Both Nostrils four times a day  sodium chloride 3%  Inhalation 4 milliLiter(s) Inhalation every 12 hours    MEDICATIONS  (PRN):  acetaminophen     Tablet .. 650 milliGRAM(s) Oral every 6 hours PRN Temp greater or equal to 38C (100.4F), Mild Pain (1 - 3)  guaiFENesin Oral Liquid (Sugar-Free) 100 milliGRAM(s) Oral every 6 hours PRN Cough  hydrocodone/homatropine Syrup 5 milliLiter(s) Oral every 6 hours PRN Cough      LABS:                        9.5    6.48  )-----------( 339      ( 29 Jun 2022 07:45 )             29.8     Hgb Trend: 9.5<--, 9.4<--, 9.8<--, 10.1<--, 9.2<--  06-29    133<L>  |  100  |  12  ----------------------------<  93  4.0   |  26  |  0.56    Ca    8.3<L>      29 Jun 2022 07:45  Phos  3.6     06-29  Mg     2.1     06-29    TPro  8.7<H>  /  Alb  2.5<L>  /  TBili  0.6  /  DBili  <0.1  /  AST  27  /  ALT  18  /  AlkPhos  71  06-29    Creatinine Trend: 0.56<--, 0.55<--, 0.51<--, 0.55<--, 0.56<--, 0.61<--            MICROBIOLOGY:     Culture - Acid Fast - Sputum w/Smear (collected 28 Jun 2022 09:20)  Source: .Sputum Sputum  Preliminary Report (29 Jun 2022 15:04):    Culture is being performed.    Culture - Acid Fast - Sputum w/Smear (collected 27 Jun 2022 16:13)  Source: .Sputum Sputum  Preliminary Report (29 Jun 2022 15:04):    Culture is being performed.

## 2022-06-30 NOTE — PROGRESS NOTE ADULT - ASSESSMENT
68 yo female with PMHx of recently diagnosed multiple myeloma (not on chemo), HTN p/w fever with T.max of 102 and non productive cough likely 2/2 COVID pneumonia tx w/ remdesevir s/p 5/5 doses found AFB (-), quantiferon (-), with persisting fevers also complaining of epistaxis s/p nasal cauterization by ENT on 6/28

## 2022-06-30 NOTE — PROGRESS NOTE ADULT - PROBLEM SELECTOR PLAN 1
-cnt baci to b/l nare   - Strict blood pressure control.  - Nasal saline, 2 sprays to both nares 4 times a day  - Avoid nasal trauma; no nose rubbing, blowing or manipulating nasal packing.  Sneeze with mouth open and pinching nares.  - Avoid bending with head blow the waist.    -No heavy lifting.

## 2022-07-01 ENCOUNTER — TRANSCRIPTION ENCOUNTER (OUTPATIENT)
Age: 67
End: 2022-07-01

## 2022-07-01 VITALS
OXYGEN SATURATION: 96 % | DIASTOLIC BLOOD PRESSURE: 78 MMHG | TEMPERATURE: 98 F | SYSTOLIC BLOOD PRESSURE: 143 MMHG | HEART RATE: 84 BPM | RESPIRATION RATE: 18 BRPM

## 2022-07-01 DIAGNOSIS — R09.02 HYPOXEMIA: ICD-10-CM

## 2022-07-01 LAB
CMV DNA CSF QL NAA+PROBE: 54 — SIGNIFICANT CHANGE UP
CMV DNA SPEC NAA+PROBE-LOG#: 1.74 LOG10IU/ML — HIGH
EBV EA AB SER IA-ACNC: <5 U/ML — SIGNIFICANT CHANGE UP
EBV EA AB TITR SER IF: POSITIVE
EBV EA IGG SER-ACNC: NEGATIVE — SIGNIFICANT CHANGE UP
EBV NA IGG SER IA-ACNC: 76.1 U/ML — HIGH
EBV PATRN SPEC IB-IMP: SIGNIFICANT CHANGE UP
EBV VCA IGG AVIDITY SER QL IA: NEGATIVE — SIGNIFICANT CHANGE UP
EBV VCA IGM SER IA-ACNC: 10.7 U/ML — SIGNIFICANT CHANGE UP
EBV VCA IGM SER IA-ACNC: <10 U/ML — SIGNIFICANT CHANGE UP
EBV VCA IGM TITR FLD: NEGATIVE — SIGNIFICANT CHANGE UP

## 2022-07-01 PROCEDURE — 85025 COMPLETE CBC W/AUTO DIFF WBC: CPT

## 2022-07-01 PROCEDURE — 72100 X-RAY EXAM L-S SPINE 2/3 VWS: CPT

## 2022-07-01 PROCEDURE — 81001 URINALYSIS AUTO W/SCOPE: CPT

## 2022-07-01 PROCEDURE — 71045 X-RAY EXAM CHEST 1 VIEW: CPT

## 2022-07-01 PROCEDURE — 87116 MYCOBACTERIA CULTURE: CPT

## 2022-07-01 PROCEDURE — 85379 FIBRIN DEGRADATION QUANT: CPT

## 2022-07-01 PROCEDURE — 87641 MR-STAPH DNA AMP PROBE: CPT

## 2022-07-01 PROCEDURE — 82565 ASSAY OF CREATININE: CPT

## 2022-07-01 PROCEDURE — 97162 PT EVAL MOD COMPLEX 30 MIN: CPT

## 2022-07-01 PROCEDURE — 87640 STAPH A DNA AMP PROBE: CPT

## 2022-07-01 PROCEDURE — 86803 HEPATITIS C AB TEST: CPT

## 2022-07-01 PROCEDURE — 87206 SMEAR FLUORESCENT/ACID STAI: CPT

## 2022-07-01 PROCEDURE — 84132 ASSAY OF SERUM POTASSIUM: CPT

## 2022-07-01 PROCEDURE — 86140 C-REACTIVE PROTEIN: CPT

## 2022-07-01 PROCEDURE — 82330 ASSAY OF CALCIUM: CPT

## 2022-07-01 PROCEDURE — 84145 PROCALCITONIN (PCT): CPT

## 2022-07-01 PROCEDURE — 86665 EPSTEIN-BARR CAPSID VCA: CPT

## 2022-07-01 PROCEDURE — 99239 HOSP IP/OBS DSCHRG MGMT >30: CPT | Mod: GC

## 2022-07-01 PROCEDURE — 99285 EMERGENCY DEPT VISIT HI MDM: CPT | Mod: 25

## 2022-07-01 PROCEDURE — 87070 CULTURE OTHR SPECIMN AEROBIC: CPT

## 2022-07-01 PROCEDURE — 97530 THERAPEUTIC ACTIVITIES: CPT

## 2022-07-01 PROCEDURE — 85018 HEMOGLOBIN: CPT

## 2022-07-01 PROCEDURE — 82728 ASSAY OF FERRITIN: CPT

## 2022-07-01 PROCEDURE — 36415 COLL VENOUS BLD VENIPUNCTURE: CPT

## 2022-07-01 PROCEDURE — 84100 ASSAY OF PHOSPHORUS: CPT

## 2022-07-01 PROCEDURE — 86480 TB TEST CELL IMMUN MEASURE: CPT

## 2022-07-01 PROCEDURE — 85027 COMPLETE CBC AUTOMATED: CPT

## 2022-07-01 PROCEDURE — 80048 BASIC METABOLIC PNL TOTAL CA: CPT

## 2022-07-01 PROCEDURE — 80053 COMPREHEN METABOLIC PANEL: CPT

## 2022-07-01 PROCEDURE — 72100 X-RAY EXAM L-S SPINE 2/3 VWS: CPT | Mod: 26

## 2022-07-01 PROCEDURE — 82435 ASSAY OF BLOOD CHLORIDE: CPT

## 2022-07-01 PROCEDURE — 71250 CT THORAX DX C-: CPT

## 2022-07-01 PROCEDURE — 82803 BLOOD GASES ANY COMBINATION: CPT

## 2022-07-01 PROCEDURE — 99232 SBSQ HOSP IP/OBS MODERATE 35: CPT

## 2022-07-01 PROCEDURE — 87015 SPECIMEN INFECT AGNT CONCNTJ: CPT

## 2022-07-01 PROCEDURE — 86663 EPSTEIN-BARR ANTIBODY: CPT

## 2022-07-01 PROCEDURE — 85014 HEMATOCRIT: CPT

## 2022-07-01 PROCEDURE — 87086 URINE CULTURE/COLONY COUNT: CPT

## 2022-07-01 PROCEDURE — 84295 ASSAY OF SERUM SODIUM: CPT

## 2022-07-01 PROCEDURE — 86664 EPSTEIN-BARR NUCLEAR ANTIGEN: CPT

## 2022-07-01 PROCEDURE — 80076 HEPATIC FUNCTION PANEL: CPT

## 2022-07-01 PROCEDURE — 83605 ASSAY OF LACTIC ACID: CPT

## 2022-07-01 PROCEDURE — 87040 BLOOD CULTURE FOR BACTERIA: CPT

## 2022-07-01 PROCEDURE — 82947 ASSAY GLUCOSE BLOOD QUANT: CPT

## 2022-07-01 PROCEDURE — 0225U NFCT DS DNA&RNA 21 SARSCOV2: CPT

## 2022-07-01 PROCEDURE — 87385 HISTOPLASMA CAPSUL AG IA: CPT

## 2022-07-01 PROCEDURE — 94640 AIRWAY INHALATION TREATMENT: CPT

## 2022-07-01 PROCEDURE — 97116 GAIT TRAINING THERAPY: CPT

## 2022-07-01 PROCEDURE — 83735 ASSAY OF MAGNESIUM: CPT

## 2022-07-01 RX ORDER — GABAPENTIN 400 MG/1
1 CAPSULE ORAL
Qty: 0 | Refills: 0 | DISCHARGE

## 2022-07-01 RX ORDER — AMLODIPINE BESYLATE 2.5 MG/1
1 TABLET ORAL
Qty: 0 | Refills: 0 | DISCHARGE

## 2022-07-01 RX ORDER — GABAPENTIN 400 MG/1
1 CAPSULE ORAL
Qty: 0 | Refills: 0 | DISCHARGE
Start: 2022-07-01

## 2022-07-01 RX ORDER — GABAPENTIN 400 MG/1
100 CAPSULE ORAL
Refills: 0 | Status: DISCONTINUED | OUTPATIENT
Start: 2022-07-01 | End: 2022-07-01

## 2022-07-01 RX ORDER — ACETAMINOPHEN 500 MG
2 TABLET ORAL
Qty: 0 | Refills: 0 | DISCHARGE
Start: 2022-07-01

## 2022-07-01 RX ADMIN — Medication 1 APPLICATION(S): at 05:53

## 2022-07-01 RX ADMIN — ESCITALOPRAM OXALATE 5 MILLIGRAM(S): 10 TABLET, FILM COATED ORAL at 12:18

## 2022-07-01 RX ADMIN — GABAPENTIN 200 MILLIGRAM(S): 400 CAPSULE ORAL at 05:51

## 2022-07-01 RX ADMIN — Medication 100 MILLIGRAM(S): at 20:47

## 2022-07-01 RX ADMIN — Medication 2 SPRAY(S): at 17:35

## 2022-07-01 RX ADMIN — Medication 2 SPRAY(S): at 05:51

## 2022-07-01 RX ADMIN — SODIUM CHLORIDE 4 MILLILITER(S): 9 INJECTION INTRAMUSCULAR; INTRAVENOUS; SUBCUTANEOUS at 05:52

## 2022-07-01 RX ADMIN — Medication 100 MILLIGRAM(S): at 05:51

## 2022-07-01 RX ADMIN — Medication 2 SPRAY(S): at 12:20

## 2022-07-01 RX ADMIN — CHLORHEXIDINE GLUCONATE 1 APPLICATION(S): 213 SOLUTION TOPICAL at 05:52

## 2022-07-01 RX ADMIN — Medication 100 MILLIGRAM(S): at 12:18

## 2022-07-01 NOTE — PROGRESS NOTE ADULT - PROBLEM SELECTOR PLAN 4
- Pt having chronic left leg pain and numbness 2/2 lumbar disc herniation  - Outpatient x-ray negative for fracture dislocation  - gabapentin 200mg BID per pt preference  - PT eval appreciated - Pt having chronic left leg pain and numbness 2/2 lumbar disc herniation vs infectious process(?)  - Outpatient x-ray negative for fracture dislocation  - gabapentin 200mg BID per pt preference  - PT eval appreciated

## 2022-07-01 NOTE — PROGRESS NOTE ADULT - PROBLEM SELECTOR PLAN 1
Patient with 1 month of fevers at home prior to covid infection -edwin for TB, recovering from COVID pna s/p remdesivir 5/5 doses. Still with fevers and night sweats.    No fevers overnight 6/29-6/30  f/u ID recs prior to d/c Patient with 1 month of fevers at home prior to covid infection -edwin for TB, recovering from COVID pna s/p remdesivir 5/5 doses. Still with fevers and night sweats.    Last known fever 6/28    f/u ID recs prior to d/c  - Lumbar xray, CMV/EBV PCR

## 2022-07-01 NOTE — PROGRESS NOTE ADULT - SUBJECTIVE AND OBJECTIVE BOX
Patient is a 67y old  Female who presents with a chief complaint of fever and cough (01 Jul 2022 06:54)    Being followed by ID for        Interval history:  No other acute events      ROS:  No cough,SOB,CP  No N/V/D  No abd pain  No urinary complaints  No HA  No joint or limb pain  No other complaints    PAST MEDICAL & SURGICAL HISTORY:  HTN (hypertension)      HLD (hyperlipidemia)      Multiple myeloma      No significant past surgical history        Allergies    No Known Allergies    Intolerances      Antimicrobials:      MEDICATIONS  (STANDING):  BACItracin   Ointment 1 Application(s) Topical two times a day  benzonatate 100 milliGRAM(s) Oral every 8 hours  chlorhexidine 2% Cloths 1 Application(s) Topical <User Schedule>  enoxaparin Injectable 40 milliGRAM(s) SubCutaneous every 24 hours  escitalopram 5 milliGRAM(s) Oral daily  gabapentin 200 milliGRAM(s) Oral two times a day  sodium chloride 0.65% Nasal 2 Spray(s) Both Nostrils four times a day  sodium chloride 3%  Inhalation 4 milliLiter(s) Inhalation every 12 hours      Vital Signs Last 24 Hrs  T(C): 37.1 (07-01-22 @ 06:08), Max: 37.6 (06-30-22 @ 19:52)  T(F): 98.7 (07-01-22 @ 06:08), Max: 99.6 (06-30-22 @ 19:52)  HR: 80 (07-01-22 @ 06:08) (80 - 91)  BP: 160/75 (06-30-22 @ 19:52) (139/69 - 160/75)  BP(mean): --  RR: 18 (07-01-22 @ 06:08) (18 - 18)  SpO2: 96% (07-01-22 @ 06:08) (96% - 98%)    Physical Exam:    Constitutional well preserved,comfortable,pleasant    HEENT PERRLA EOMI,No pallor or icterus    No oral exudate or erythema    Neck supple no JVD or LN    Chest Good AE,CTA    CVS RRR S1 S2 WNl No murmur or rub or gallop    Abd soft BS normal No tenderness no masses    Ext No cyanosis clubbing or edema    IV site no erythema tenderness or discharge    Joints no swelling or LOM    CNS AAO X 3 no focal    Lab Data:        06-30    x   |  x   |  x   ----------------------------<  x   x    |  x   |  0.57      TPro  8.4<H>  /  Alb  2.5<L>  /  TBili  0.4  /  DBili  <0.1  /  AST  22  /  ALT  19  /  AlkPhos  73  06-30          .Sputum Sputum  06-28-22   Culture is being performed.  --  --      .Sputum Sputum  06-27-22   Culture is being performed.  --  --      .Sputum Sputum  06-25-22   Culture is being performed.  --  --      .Sputum Sputum  06-24-22   Normal Respiratory Sahra present  --    Few polymorphonuclear leukocytes per low power field  Numerous Squamous epithelial cells per low power field  Numerous Gram Positive Cocci in Clusters seen per oil power field  Numerous Gram Variable Rods seen per oil power field                    WBC Count: 6.48 (06-29-22 @ 07:45)  WBC Count: 6.26 (06-28-22 @ 07:09)  WBC Count: 10.21 (06-27-22 @ 07:24)  WBC Count: 8.78 (06-26-22 @ 07:09)  WBC Count: 9.66 (06-25-22 @ 07:34)             Patient is a 67y old  Female who presents with a chief complaint of fever and cough (01 Jul 2022 06:54)    Being followed by ID for Covid-19        Interval history:  feels tired from the gabapentin  temps remain less  breathing stable   No other acute events          PAST MEDICAL & SURGICAL HISTORY:  HTN (hypertension)      HLD (hyperlipidemia)      Multiple myeloma      No significant past surgical history        Allergies    No Known Allergies    Intolerances      Antimicrobials:      MEDICATIONS  (STANDING):  BACItracin   Ointment 1 Application(s) Topical two times a day  benzonatate 100 milliGRAM(s) Oral every 8 hours  chlorhexidine 2% Cloths 1 Application(s) Topical <User Schedule>  enoxaparin Injectable 40 milliGRAM(s) SubCutaneous every 24 hours  escitalopram 5 milliGRAM(s) Oral daily  gabapentin 200 milliGRAM(s) Oral two times a day  sodium chloride 0.65% Nasal 2 Spray(s) Both Nostrils four times a day  sodium chloride 3%  Inhalation 4 milliLiter(s) Inhalation every 12 hours      Vital Signs Last 24 Hrs  T(C): 37.1 (07-01-22 @ 06:08), Max: 37.6 (06-30-22 @ 19:52)  T(F): 98.7 (07-01-22 @ 06:08), Max: 99.6 (06-30-22 @ 19:52)  HR: 80 (07-01-22 @ 06:08) (80 - 91)  BP: 160/75 (06-30-22 @ 19:52) (139/69 - 160/75)  BP(mean): --  RR: 18 (07-01-22 @ 06:08) (18 - 18)  SpO2: 96% (07-01-22 @ 06:08) (96% - 98%)    Physical Exam:    Constitutional well preserved,comfortable,pleasant    HEENT PERRLA EOMI,No pallor or icterus    No oral exudate or erythema    Neck supple no JVD or LN    Chest Good AE,CTA    CVS  S1 S2     Abd soft BS normal No tenderness     Ext No cyanosis clubbing or edema    IV site no erythema tenderness or discharge    Joints no swelling or LOM    CNS AAO X 3 no focal    Lab Data:        06-30    x   |  x   |  x   ----------------------------<  x   x    |  x   |  0.57      TPro  8.4<H>  /  Alb  2.5<L>  /  TBili  0.4  /  DBili  <0.1  /  AST  22  /  ALT  19  /  AlkPhos  73  06-30    Culture - Acid Fast - Sputum w/Smear . (06.28.22 @ 09:20)    Acid Fast Bacilli Smear:   No acid fast bacilli seen by fluorochrome stain          .Sputum Sputum  06-24-22   Normal Respiratory Sahra present  --    Few polymorphonuclear leukocytes per low power field  Numerous Squamous epithelial cells per low power field  Numerous Gram Positive Cocci in Clusters seen per oil power field  Numerous Gram Variable Rods seen per oil power field          WBC Count: 6.48 (06-29-22 @ 07:45)  WBC Count: 6.26 (06-28-22 @ 07:09)  WBC Count: 10.21 (06-27-22 @ 07:24)  WBC Count: 8.78 (06-26-22 @ 07:09)  WBC Count: 9.66 (06-25-22 @ 07:34)        < from: Xray Lumbar Spine AP + Lateral (07.01.22 @ 16:35) >    IMPRESSION:  No compression fractures, spondylolistheses, or spondylolysis defects.    Significantly narrowed L5-S1 disc space. Preserved remaining   intervertebral disc spaces.    Unremarkable SI joints and partially visualized hips.    Small vague round sclerotic density focus projecting in L4 vertebral body   on lateral view may represent a bone island versus other superimposed   nodular shadow. No additional focal osseous lesions.    < end of copied text >

## 2022-07-01 NOTE — PROGRESS NOTE ADULT - REASON FOR ADMISSION
fever and cough

## 2022-07-01 NOTE — DISCHARGE NOTE NURSING/CASE MANAGEMENT/SOCIAL WORK - PATIENT PORTAL LINK FT
You can access the FollowMyHealth Patient Portal offered by [facility] by registering at the following website: [email or web address]. By joining [facility]’s FollowMyHealth portal, you will also be able to view your health information using other applications (apps) compatible with our system.

## 2022-07-01 NOTE — PROGRESS NOTE ADULT - ASSESSMENT
66 yo female with PMHx of recently diagnosed multiple myeloma (not on chemo), HTN p/w fever with T.max of 102 and non productive cough likely 2/2 COVID pneumonia tx w/ remdesevir s/p 5/5 doses found AFB (-), quantiferon (-), with persisting fevers also complaining of epistaxis s/p nasal cauterization by ENT on 6/28

## 2022-07-01 NOTE — PROGRESS NOTE ADULT - ASSESSMENT
66 yo female with PMHx of recently diagnosed multiple myeloma (not on chemo), HTN p/w fever and cough. She was seen at [facility] for similar symptoms 2 weeks ago and was discharged with cefpodoxime and azithromycin. According to arturo she presented with low grade fevers and leg sxs. She was diagnosed with multiple myeloma and the soctor said since you have this diagnosis and fever , you should go to the ER. The ER gave her oral abs and d/john her Per pt, her symptoms did not resolve. She started having a non-productive cough yesterday and had a temp of 102. Pt  and mother tested positive for COVID 3 days ago. She took a home test today which was negative. She also endorses mild dysuria intermittently. Pt states that she has having low grade fever (99 to 100.1) for the past 2 months with left lower leg pain/numbness. She denies any chest pain, SOB, N/V, abdominal pain or dizziness.   pt has been coughing up blood for the past month. Pt believes it is coming from her nose. She can also blow her nose with clots. She went to an ENt who did not find anything   Pt's daughter had MTb when she was [age] and was treated for a year. The patient took care of her at the time  In the ED, pt had a Tmax of 100.8, HR of 110, BP of 154/73 and saturating well on RA. In the ED, pt given 500cc bolus and Vanc + cefepime (23 Jun 2022 12:19)  Pt was started on abs but continues to spike .Covid swab was negative  CXR with RUL infiltrate    A/P    #COVID 19  Likely etiology of recent high grade fevers  However, low grade fevers for weeks (which I doubt is secondary to COVID19)  --Maintain COVID19 Isolation Precautions  --Recommend Ferritin, CRP, D-Dimer every 48-72H  --completed Remdesivir x 5 days total  --No indication for Dexamethasone at present    #Fever    -- cefepime d/john  --Quanteferon is negative! This doesn't r/o MTb but makes it less ikely   3rd sputum is negative   urine histo negative   blood cultures are negative   - temps are much improved, still some vague low grade fever    #LLE NUMBNESS  Hematology/Oncology called to see patient who is well known to Dr. Munir Sam of [facility] for the management of IgG Lambda multiple myeloma. Skeletal survey showed two small lucencies in the femoral neck but PET/CT was normal and patient has normal renal function and minimal anemia. Her only complaint was peripheral neuropathy in her feet. However, secondary to rising kappa lambda FLC ratio, M protein and other high risk features, treatment was recommended (Dr. Sam recommending daratumumab, bortezomib lenalidomide and dexamethazone) that was tentatively scheduled to start on 7/5/2022. Patient was planning to get a second opinion at [facility] before starting treatment. When the patient was seen by Dr. Sam on 6/17, she was experiencing fevers to 102 and was referred to the ED.  XRAY of LS spine- noted    #Epistaxis  appreciate ENT input   an area of excoriation  on the right septum was noted and cauterized. Bacitracin was placed and bleeding controlled.  no further active bleeding    Dixie Mercedes M.D. ,   please reach via teams   If no answer, or after 5PM/ weekends,  then please call  [phone]    Assessment and plan discussed with the primary team .  can likely continue work up as an outpatient     ID service will be covering over the weekend. Please call for acute issues or questions. [phone]

## 2022-07-01 NOTE — PROGRESS NOTE ADULT - ATTENDING COMMENTS
Pt with M Myeloma, not started on treatment yet pw fever, b/l opacities. COVID positive.     Continue Remdesivir- 5 day course. ID following. R/o TB per ID input given prolonged fever.
Patient seen and evaluated with daughter on phone. Patient with +clots in nose when blows nose. Occuring last 3 weeks, saw ENT, no clear answer. +cough, fever. No other complaints    #Covid PNA  -continue remdesivir for 5 day course. Not hypoxic, no role for dexamethasone.    #r/o TB  -fevers x 3 weeks, had +blood-tinged sputum, close contact with TB  -AFB x 1 negative, induce sputum x 2.    #Fevers  -unclear etiplogy, r/o TB. If febrile with negative AFB, may need CT abd/pelvis to ensure no occult infection.  -my suspicion is myeloma +covid.     #nose bleed  -+clots per michael, they are requesting second opinion from ENT. Will call in AM.
Pt with M Myeloma, not started on treatment yet pw fever, RUL pneumonia, failure of outpatient abx.     Recent COVID exposure but PCR negative- repeat today.     Continue Cefepime for now, monitor.
Patient seen and evaluated. no fevers. . Slightly groggy from incresaed dose of gabapentin, will reduce.     #Covid PNA  -s/p remdesivir. Not hypoxic, no role for dexamethasone.      #Fevers  -unclear etiology, MM not typically associated with FUO, though there was a case series of 9 patients  (https://pubmed.ncbi.nlm.nih.gov/02109022/)  -downtrending fever curve .  -discussed with ID. As long as no major abnormalities on Xray lumbar spine, can be discharged today.     #nose bleed  -ENT apprecaited, s/p cauterization. outpatient ENT.    #Dispo: D/C planning likely today with outpatient hematology and ID follow up.   d/c planning 40 minutes.
Patient seen and evaluated. Tmax 100.7 overnight. Patient still with some blood clots with coughing.     #Covid PNA  -last day of . Not hypoxic, no role for dexamethasone.    #r/o TB  -fevers x 3 weeks, had +blood-tinged sputum, close contact with TB  -AFB x3 negative to date. On isolation for COVID, can likeyl d/c for TB.    #Fevers  -unclear etiology, r/o TB.  -downtrending fever curve .  -as long as downtrending can hold off panscan.     #nose bleed  -ENT apprecaited, s/p cauterization. AS still with bleeding, if continues tomorrow will ask to re-assess.
67 year old female with recent MM diagnosis presenting with fever and cough found to be COVID+. Started on Remdesivir. On Tylenol for fever control, will add Tessalon Perles for cough. Stopping Cefepime in the setting of COVID positive. ID consulted for r/o TB, will collect sputum AFB. Rest of plan as above.
Patient seen and evaluated. no fevers. . Patient still with some blood clots from nares.     #Covid PNA  -s/p remdesivir. Not hypoxic, no role for dexamethasone.      #Fevers  -unclear etiology, MM not typically associated with FUO, though there was a case series of 9 patients  (https://pubmed.ncbi.nlm.nih.gov/74406536/)  -downtrending fever curve .  -await ID clearance prior to d/c.    #nose bleed  -ENT apprecaited, s/p cauterization. AS still with bleeding, asked to re-assess    #Dispo: D/C planning pendign ID clearance.
Patient seen and evaluated. Fever curve continues to downtrend. 3rd AFB sputum collected this AM    #Covid PNA  -continue remdesivir for 5 day course. Not hypoxic, no role for dexamethasone.    #r/o TB  -fevers x 3 weeks, had +blood-tinged sputum, close contact with TB  -AFB x3 collected, x1 negative.     #Fevers  -unclear etiology, r/o TB.  -downtrending fever curve .  -as long as downtrending can hold off panscan.     #nose bleed  -ENT consulted.

## 2022-07-01 NOTE — PROGRESS NOTE ADULT - SUBJECTIVE AND OBJECTIVE BOX
INCOMPLETE NOTE    Tristen Loza | PGY1| Pager: [phone]  Interval Events:    REVIEW OF SYSTEMS:  CONSTITUTIONAL: No weakness, fevers or chills  EYES/ENT: No visual changes;  No vertigo or throat pain   NECK: No pain or stiffness  RESPIRATORY: No cough, wheezing, hemoptysis; No shortness of breath  CARDIOVASCULAR: No chest pain or palpitations  GASTROINTESTINAL: No abdominal or epigastric pain. No nausea, vomiting, or hematemesis; No diarrhea or constipation. No melena or hematochezia.  GENITOURINARY: No dysuria, frequency or hematuria  NEUROLOGICAL: No numbness or weakness  SKIN: No itching, burning, rashes, or lesions   All other review of systems is negative unless indicated above.    OBJECTIVE:  ICU Vital Signs Last 24 Hrs  T(C): 37.1 (01 Jul 2022 06:08), Max: 37.6 (30 Jun 2022 19:52)  T(F): 98.7 (01 Jul 2022 06:08), Max: 99.6 (30 Jun 2022 19:52)  HR: 80 (01 Jul 2022 06:08) (80 - 91)  BP: 160/75 (30 Jun 2022 19:52) (139/69 - 160/75)  BP(mean): --  ABP: --  ABP(mean): --  RR: 18 (01 Jul 2022 06:08) (18 - 18)  SpO2: 96% (01 Jul 2022 06:08) (96% - 98%)        06-29 @ 07:01 - 06-30 @ 07:00  --------------------------------------------------------  IN: 1330 mL / OUT: 0 mL / NET: 1330 mL    06-30 @ 07:01  - 07-01 @ 06:55  --------------------------------------------------------  IN: 240 mL / OUT: 0 mL / NET: 240 mL      CAPILLARY BLOOD GLUCOSE          PHYSICAL EXAM:  General: WN/WD NAD  Neurology: A&Ox3, nonfocal, PEREZ x 4  Eyes: PERRLA/ EOMI, Gross vision intact  ENT/Neck: Neck supple, trachea midline, No JVD, Gross hearing intact  Respiratory: CTA B/L, No wheezing, rales, rhonchi  CV: RRR, +S1/S2, -S3/S4, no murmurs, rubs or gallops  Abdominal: Soft, NT, ND +BS, No HSM  MSK: 5/5 strength UE/LE bilaterally  Extremities: No edema, 2+ peripheral pulses  Skin: No Rashes, Hematoma, Ecchymosis  Incisions:   Tubes:    HOSPITAL MEDICATIONS:  MEDICATIONS  (STANDING):  BACItracin   Ointment 1 Application(s) Topical two times a day  benzonatate 100 milliGRAM(s) Oral every 8 hours  chlorhexidine 2% Cloths 1 Application(s) Topical <User Schedule>  enoxaparin Injectable 40 milliGRAM(s) SubCutaneous every 24 hours  escitalopram 5 milliGRAM(s) Oral daily  gabapentin 200 milliGRAM(s) Oral two times a day  sodium chloride 0.65% Nasal 2 Spray(s) Both Nostrils four times a day  sodium chloride 3%  Inhalation 4 milliLiter(s) Inhalation every 12 hours    MEDICATIONS  (PRN):  acetaminophen     Tablet .. 650 milliGRAM(s) Oral every 6 hours PRN Temp greater or equal to 38C (100.4F), Mild Pain (1 - 3)  guaiFENesin Oral Liquid (Sugar-Free) 100 milliGRAM(s) Oral every 6 hours PRN Cough  hydrocodone/homatropine Syrup 5 milliLiter(s) Oral every 6 hours PRN Cough      LABS:                        9.5    6.48  )-----------( 339      ( 29 Jun 2022 07:45 )             29.8     Hgb Trend: 9.5<--, 9.4<--, 9.8<--, 10.1<--, 9.2<--  06-30    x   |  x   |  x   ----------------------------<  x   x    |  x   |  0.57    Ca    8.3<L>      29 Jun 2022 07:45  Phos  3.6     06-29  Mg     2.1     06-29    TPro  8.4<H>  /  Alb  2.5<L>  /  TBili  0.4  /  DBili  <0.1  /  AST  22  /  ALT  19  /  AlkPhos  73  06-30    Creatinine Trend: 0.57<--, 0.56<--, 0.55<--, 0.51<--, 0.55<--, 0.56<--            MICROBIOLOGY:     Culture - Acid Fast - Sputum w/Smear (collected 28 Jun 2022 09:20)  Source: .Sputum Sputum  Preliminary Report (29 Jun 2022 15:04):    Culture is being performed.       Tristen Dago | PGY1| Pager: [phone]  Interval Events: Seenand examied this AM. Patient without fevers o/n. Reports improvement in cough and night sweating. LLE still painful. Reports no new changes. Outpatient records from [place] [facility] to be sent by daughter    REVIEW OF SYSTEMS:  CONSTITUTIONAL: No weakness, fevers or chills  EYES/ENT: No visual changes;  No vertigo or throat pain   NECK: No pain or stiffness  RESPIRATORY: No cough, wheezing, hemoptysis; No shortness of breath  CARDIOVASCULAR: No chest pain or palpitations  GASTROINTESTINAL: No abdominal or epigastric pain. No nausea, vomiting, or hematemesis; No diarrhea or constipation.   GENITOURINARY: No dysuria, frequency or hematuria  NEUROLOGICAL: No numbness or weakness  SKIN: No itching, burning, rashes, or lesions   All other review of systems is negative unless indicated above.    OBJECTIVE:  ICU Vital Signs Last 24 Hrs  T(C): 37.1 (01 Jul 2022 06:08), Max: 37.6 (30 Jun 2022 19:52)  T(F): 98.7 (01 Jul 2022 06:08), Max: 99.6 (30 Jun 2022 19:52)  HR: 80 (01 Jul 2022 06:08) (80 - 91)  BP: 160/75 (30 Jun 2022 19:52) (139/69 - 160/75)  BP(mean): --  ABP: --  ABP(mean): --  RR: 18 (01 Jul 2022 06:08) (18 - 18)  SpO2: 96% (01 Jul 2022 06:08) (96% - 98%)        06-29 @ 07:01 - 06-30 @ 07:00  --------------------------------------------------------  IN: 1330 mL / OUT: 0 mL / NET: 1330 mL    06-30 @ 07:01 - 07-01 @ 06:55  --------------------------------------------------------  IN: 240 mL / OUT: 0 mL / NET: 240 mL      CAPILLARY BLOOD GLUCOSE          PHYSICAL EXAM:  General: WN/WD NAD, seen comfortably sleeping in bed  Neurology: A&Ox3, nonfocal, PEREZ x 4  Eyes: PERRLA/ EOMI, Gross vision intact  ENT/Neck: Neck supple, trachea midline, No JVD, Gross hearing intact  Respiratory: CTA B/L, No wheezing, rales, rhonchi  CV: RRR, +S1/S2, -S3/S4, no murmurs, rubs or gallops  Abdominal: Soft, NT, ND +BS, No HSM  MSK: 5/5 strength UE bilaterally. LLE exam limited today due to discomfort  Extremities: No edema, 2+ peripheral pulses  Skin: No Rashes, Hematoma, Ecchymosis  Incisions:   Tubes:    HOSPITAL MEDICATIONS:  MEDICATIONS  (STANDING):  BACItracin   Ointment 1 Application(s) Topical two times a day  benzonatate 100 milliGRAM(s) Oral every 8 hours  chlorhexidine 2% Cloths 1 Application(s) Topical <User Schedule>  enoxaparin Injectable 40 milliGRAM(s) SubCutaneous every 24 hours  escitalopram 5 milliGRAM(s) Oral daily  gabapentin 200 milliGRAM(s) Oral two times a day  sodium chloride 0.65% Nasal 2 Spray(s) Both Nostrils four times a day  sodium chloride 3%  Inhalation 4 milliLiter(s) Inhalation every 12 hours    MEDICATIONS  (PRN):  acetaminophen     Tablet .. 650 milliGRAM(s) Oral every 6 hours PRN Temp greater or equal to 38C (100.4F), Mild Pain (1 - 3)  guaiFENesin Oral Liquid (Sugar-Free) 100 milliGRAM(s) Oral every 6 hours PRN Cough  hydrocodone/homatropine Syrup 5 milliLiter(s) Oral every 6 hours PRN Cough      LABS:                        9.5    6.48  )-----------( 339      ( 29 Jun 2022 07:45 )             29.8     Hgb Trend: 9.5<--, 9.4<--, 9.8<--, 10.1<--, 9.2<--  06-30    x   |  x   |  x   ----------------------------<  x   x    |  x   |  0.57    Ca    8.3<L>      29 Jun 2022 07:45  Phos  3.6     06-29  Mg     2.1     06-29    TPro  8.4<H>  /  Alb  2.5<L>  /  TBili  0.4  /  DBili  <0.1  /  AST  22  /  ALT  19  /  AlkPhos  73  06-30    Creatinine Trend: 0.57<--, 0.56<--, 0.55<--, 0.51<--, 0.55<--, 0.56<--            MICROBIOLOGY:     Culture - Acid Fast - Sputum w/Smear (collected 28 Jun 2022 09:20)  Source: .Sputum Sputum  Preliminary Report (29 Jun 2022 15:04):    Culture is being performed.

## 2022-07-01 NOTE — PROGRESS NOTE ADULT - PROVIDER SPECIALTY LIST ADULT
Heme/Onc
Infectious Disease
Internal Medicine
Infectious Disease
Infectious Disease
ENT
Internal Medicine

## 2022-07-01 NOTE — PROGRESS NOTE ADULT - PROBLEM SELECTOR PLAN 6
-Being followed by Dr. Munir Sam of [facility]  -Treatment was tentatively scheduled to start 7/5, Pending second opinion at [place] on 6/29/2022  -Heme onc following

## 2022-07-01 NOTE — PROGRESS NOTE ADULT - PROBLEM SELECTOR PLAN 3
COVID PNA without oxygen requirements now s/p remdesivir 5/5 doses. Tuberculosis negative w/ quantiferon gold, urine histoplasma antigen, and AFB smears x3.    - Tested positive 6/24;  positive 2 weeks ago; daughter currently positive  - fully vaccinated, boosted  - elevated d-dimer, ferritin, CRP consistent with covid  - CT chest 6/24 consistent with covid pna  - not a candidate for monoclonal antibiodies per EUA since she presented with covid    Plan:  - Finished remdesivir x5d (6/25 -6/29)  - f/u ID recs prior to d/c  -symptomatic tx of cough with tessalon perles, robitussin, hycodan for severe cough

## 2022-07-01 NOTE — PROGRESS NOTE ADULT - PROBLEM SELECTOR PLAN 7
sepsis and fevers likely 2/2 COVID PNA however she has had sxs for the past month with low-grade fevers  -BCx NGTD, MRSA neg  -cefepime dc'd as sputum culture nonspecific, no other evidence of bacterial pneumonia    Plan:  D/w ID the utility of CT Abdomen for r/o abdomenal abscess- if febrile again will obtain abdomenal CT sepsis and fevers likely 2/2 COVID PNA however she has had sxs for the past month with low-grade fevers  -BCx NGTD, MRSA neg  -cefepime dc'd as sputum culture nonspecific, no other evidence of bacterial pneumonia    Plan:  CT Abdomen for r/o abdomenal abscess -- only if febrile again will obtain abdomenal CT

## 2022-07-01 NOTE — DISCHARGE NOTE NURSING/CASE MANAGEMENT/SOCIAL WORK - NSDCPEFALRISK_GEN_ALL_CORE
For information on Fall & Injury Prevention, visit: [email or web address] OR  [email or web address] OR  https://www.cdc.gov/steadi/patient.html

## 2022-07-01 NOTE — PROGRESS NOTE ADULT - PROBLEM SELECTOR PLAN 8
DVT ppx: Lovenox  Diet: regular + ensures    Dispo: pending clinical improvement
DVT ppx: Lovenox  Diet: regular + ensures    Dispo: pending clinical improvement

## 2022-07-05 ENCOUNTER — APPOINTMENT (OUTPATIENT)
Dept: NEUROLOGY | Facility: CLINIC | Age: 67
End: 2022-07-05

## 2022-07-06 ENCOUNTER — TRANSCRIPTION ENCOUNTER (OUTPATIENT)
Age: 67
End: 2022-07-06

## 2022-07-07 ENCOUNTER — TRANSCRIPTION ENCOUNTER (OUTPATIENT)
Age: 67
End: 2022-07-07

## 2022-07-08 ENCOUNTER — TRANSCRIPTION ENCOUNTER (OUTPATIENT)
Age: 67
End: 2022-07-08

## 2022-07-21 ENCOUNTER — APPOINTMENT (OUTPATIENT)
Dept: NEUROLOGY | Facility: CLINIC | Age: 67
End: 2022-07-21

## 2022-08-19 LAB
CULTURE RESULTS: SIGNIFICANT CHANGE UP
SPECIMEN SOURCE: SIGNIFICANT CHANGE UP

## 2022-11-25 ENCOUNTER — OFFICE (OUTPATIENT)
Dept: URBAN - METROPOLITAN AREA CLINIC 6 | Facility: CLINIC | Age: 67
Setting detail: OPHTHALMOLOGY
End: 2022-11-25
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
## 2022-11-25 PROCEDURE — NO SHOW FE NO SHOW FEE: Performed by: OPHTHALMOLOGY

## 2022-11-29 ENCOUNTER — OFFICE (OUTPATIENT)
Dept: URBAN - METROPOLITAN AREA CLINIC 6 | Facility: CLINIC | Age: 67
Setting detail: OPHTHALMOLOGY
End: 2022-11-29
Payer: MEDICARE

## 2022-11-29 ENCOUNTER — RX ONLY (RX ONLY)
Age: 67
End: 2022-11-29

## 2022-11-29 DIAGNOSIS — H43.392: ICD-10-CM

## 2022-11-29 DIAGNOSIS — H43.811: ICD-10-CM

## 2022-11-29 DIAGNOSIS — H16.223: ICD-10-CM

## 2022-11-29 DIAGNOSIS — H25.13: ICD-10-CM

## 2022-11-29 PROCEDURE — 92014 COMPRE OPH EXAM EST PT 1/>: CPT | Performed by: OPHTHALMOLOGY

## 2022-11-29 ASSESSMENT — REFRACTION_CURRENTRX
OD_CYLINDER: SPHERE
OD_SPHERE: +1.75
OD_ADD: +2.50
OS_ADD: +2.50
OD_OVR_VA: 20/
OS_VPRISM_DIRECTION: PROGS
OS_AXIS: 13
OS_CYLINDER: -0.25
OS_OVR_VA: 20/
OD_VPRISM_DIRECTION: PROGS
OS_SPHERE: +1.50

## 2022-11-29 ASSESSMENT — AXIALLENGTH_DERIVED
OS_AL: 22.2557
OD_AL: 22.3427
OS_AL: 22.2991
OD_AL: 22.3865

## 2022-11-29 ASSESSMENT — SPHEQUIV_DERIVED
OS_SPHEQUIV: 1.625
OD_SPHEQUIV: 1.5
OS_SPHEQUIV: 1.75
OD_SPHEQUIV: 1.375

## 2022-11-29 ASSESSMENT — REFRACTION_AUTOREFRACTION
OS_AXIS: 004
OS_CYLINDER: -0.75
OD_AXIS: 160
OS_SPHERE: +2.00
OD_CYLINDER: -1.00
OD_SPHERE: +2.00

## 2022-11-29 ASSESSMENT — VISUAL ACUITY
OS_BCVA: 20/40-
OD_BCVA: 20/30

## 2022-11-29 ASSESSMENT — REFRACTION_MANIFEST
OD_SPHERE: +1.75
OS_CYLINDER: -1.00
OS_VA1: 20/30-
OD_CYLINDER: -0.75
OS_AXIS: 004
OD_VA1: 20/40+
OD_AXIS: 160
OS_SPHERE: +2.25

## 2022-11-29 ASSESSMENT — KERATOMETRY
OD_K1POWER_DIOPTERS: 45.00
METHOD_AUTO_MANUAL: AUTO
OS_AXISANGLE_DEGREES: 94
OD_AXISANGLE_DEGREES: 73
OD_K2POWER_DIOPTERS: 46.00
OS_K1POWER_DIOPTERS: 45.00
OS_K2POWER_DIOPTERS: 46.00

## 2022-11-29 ASSESSMENT — TONOMETRY
OD_IOP_MMHG: 12
OS_IOP_MMHG: 11

## 2022-11-29 ASSESSMENT — SUPERFICIAL PUNCTATE KERATITIS (SPK)
OD_SPK: T
OS_SPK: T

## 2023-07-11 NOTE — PATIENT PROFILE ADULT - ARE SIGNIFICANT INDICATORS COMPLETE.
Yes O-Z Flap Text: Due to geometric and functional constraints, a flap reconstruction was performed to reconstruct the defect. To that end, adjacent tissue was incised and carried over to close the defect in the following manner: The defect edges were debeveled with a #15 scalpel blade.  Given the location of the defect, shape of the defect and the proximity to free margins an O-Z flap was deemed most appropriate.  Using a sterile surgical marker, an appropriate transposition flap was drawn incorporating the defect and placing the expected incisions within the relaxed skin tension lines where possible. The area thus outlined was incised deep to adipose tissue with a #15 scalpel blade.  The skin margins were undermined to an appropriate distance in all directions utilizing iris scissors.

## 2023-09-14 NOTE — ED STATDOCS - DATE/TIME 3
Encounter addended by: Ivon Downey RN on: 9/14/2023 4:19 PM   Actions taken: Flowsheet accepted 28-Mar-2019 21:19

## 2024-09-27 NOTE — ED STATDOCS - CPE ED ENMT NORM
[No Acute Distress] : no acute distress [Well Nourished] : well nourished [Well Developed] : well developed [Well-Appearing] : well-appearing [Normal Sclera/Conjunctiva] : normal sclera/conjunctiva [PERRL] : pupils equal round and reactive to light [EOMI] : extraocular movements intact [Normal Outer Ear/Nose] : the outer ears and nose were normal in appearance [Normal Oropharynx] : the oropharynx was normal [No JVD] : no jugular venous distention [No Lymphadenopathy] : no lymphadenopathy [Supple] : supple [Thyroid Normal, No Nodules] : the thyroid was normal and there were no nodules present [No Respiratory Distress] : no respiratory distress  [No Accessory Muscle Use] : no accessory muscle use [Clear to Auscultation] : lungs were clear to auscultation bilaterally [Normal Rate] : normal rate  [Regular Rhythm] : with a regular rhythm [Normal S1, S2] : normal S1 and S2 [No Murmur] : no murmur heard normal... [No Carotid Bruits] : no carotid bruits [No Abdominal Bruit] : a ~M bruit was not heard ~T in the abdomen [No Varicosities] : no varicosities [Pedal Pulses Present] : the pedal pulses are present [No Edema] : there was no peripheral edema [No Palpable Aorta] : no palpable aorta [No Extremity Clubbing/Cyanosis] : no extremity clubbing/cyanosis [Soft] : abdomen soft [Non Tender] : non-tender [Non-distended] : non-distended [No Masses] : no abdominal mass palpated [No HSM] : no HSM [Normal Bowel Sounds] : normal bowel sounds [Normal Posterior Cervical Nodes] : no posterior cervical lymphadenopathy [Normal Anterior Cervical Nodes] : no anterior cervical lymphadenopathy [No CVA Tenderness] : no CVA  tenderness [No Spinal Tenderness] : no spinal tenderness [No Joint Swelling] : no joint swelling [Grossly Normal Strength/Tone] : grossly normal strength/tone [No Rash] : no rash [Coordination Grossly Intact] : coordination grossly intact [No Focal Deficits] : no focal deficits [Normal Gait] : normal gait [Deep Tendon Reflexes (DTR)] : deep tendon reflexes were 2+ and symmetric [Normal Affect] : the affect was normal [Normal Insight/Judgement] : insight and judgment were intact

## 2025-01-07 NOTE — PHYSICAL THERAPY INITIAL EVALUATION ADULT - NAME OF DISCHARGE PLANNER
"Medical screening examination initiated.  I have conducted a focused provider triage encounter, findings are as follows:    Brief history of present illness:  arrived to ED due to lower abdominal pain and back pain. Symptoms began several days ago. LMP End of Nov. Unsure if she is pregnant. Denies vaginal bleeding. LBM: 2 days ago.     Vitals:    01/07/25 0910 01/07/25 0911   BP:  120/67   Pulse: 76    Resp: 18    Temp: 98.4 °F (36.9 °C)    SpO2: 100%    Weight: 72.6 kg (160 lb)    Height: 5' 3" (1.6 m)        Pertinent physical exam:  awake, alert, has non-labored breathing, ambulatory into triage.     Brief workup plan:  labs     Preliminary workup initiated; this workup will be continued and followed by the physician or advanced practice provider that is assigned to the patient when roomed.  "
MARSHA Cardenas

## 2025-03-17 NOTE — ED ADULT TRIAGE NOTE - WEIGHT IN KG
-- DO NOT REPLY / DO NOT REPLY ALL --  -- This inbox is not monitored. If this was sent to the wrong provider or department, reroute message to P [facility] Reroute pool. --  -- Message is from [facility] ([facility]) --    General Patient Message: Lluvia Cruz called to relay they did not get the form for the upright walker in January.  Please have the provider fill out the outpatient medicare authorization form through [email or web address].  Lluvia is requesting this be entered as an urgent request and turn-around time is 14 days.    Caller Information       Contact Date/Time Type Contact Phone/Fax    03/17/2025 10:17 AM CDT Phone (Incoming) Lluvia / [facility] [phone]            Alternative phone number: no    Can a detailed message be left? No  Patient has been advised the message will be addressed within 2-3 business days.                 47.2

## 2025-05-12 ENCOUNTER — RESULT REVIEW (OUTPATIENT)
Age: 70
End: 2025-05-12

## 2025-05-12 ENCOUNTER — OUTPATIENT (OUTPATIENT)
Dept: OUTPATIENT SERVICES | Facility: HOSPITAL | Age: 70
LOS: 1 days | End: 2025-05-12
Payer: MEDICARE

## 2025-05-12 DIAGNOSIS — I49.1 ATRIAL PREMATURE DEPOLARIZATION: ICD-10-CM

## 2025-05-12 DIAGNOSIS — Z78.9 OTHER SPECIFIED HEALTH STATUS: ICD-10-CM

## 2025-05-12 PROCEDURE — 93017 CV STRESS TEST TRACING ONLY: CPT

## 2025-05-12 PROCEDURE — 78452 HT MUSCLE IMAGE SPECT MULT: CPT

## 2025-05-12 PROCEDURE — 78452 HT MUSCLE IMAGE SPECT MULT: CPT | Mod: 26

## 2025-05-12 PROCEDURE — 93016 CV STRESS TEST SUPVJ ONLY: CPT

## 2025-05-12 PROCEDURE — A9500: CPT

## 2025-05-12 PROCEDURE — 93018 CV STRESS TEST I&R ONLY: CPT
